# Patient Record
Sex: MALE | Race: WHITE | NOT HISPANIC OR LATINO | Employment: OTHER | URBAN - METROPOLITAN AREA
[De-identification: names, ages, dates, MRNs, and addresses within clinical notes are randomized per-mention and may not be internally consistent; named-entity substitution may affect disease eponyms.]

---

## 2018-03-09 ENCOUNTER — HISTORICAL (OUTPATIENT)
Dept: ADMINISTRATIVE | Facility: HOSPITAL | Age: 73
End: 2018-03-09

## 2018-03-09 LAB
ABS NEUT (OLG): 2.46 X10(3)/MCL (ref 2.1–9.2)
ALBUMIN SERPL-MCNC: 4 GM/DL (ref 3.4–5)
ALBUMIN/GLOB SERPL: 1.1 {RATIO}
ALP SERPL-CCNC: 78 UNIT/L (ref 50–136)
ALT SERPL-CCNC: 23 UNIT/L (ref 12–78)
AST SERPL-CCNC: 17 UNIT/L (ref 15–37)
BASOPHILS # BLD AUTO: 0 X10(3)/MCL (ref 0–0.2)
BASOPHILS NFR BLD AUTO: 0 %
BILIRUB SERPL-MCNC: 0.5 MG/DL (ref 0.2–1)
BILIRUBIN DIRECT+TOT PNL SERPL-MCNC: 0.1 MG/DL (ref 0–0.2)
BILIRUBIN DIRECT+TOT PNL SERPL-MCNC: 0.4 MG/DL (ref 0–0.8)
BUN SERPL-MCNC: 20 MG/DL (ref 7–18)
CALCIUM SERPL-MCNC: 9.1 MG/DL (ref 8.5–10.1)
CHLORIDE SERPL-SCNC: 106 MMOL/L (ref 98–107)
CHOLEST SERPL-MCNC: 191 MG/DL (ref 0–200)
CHOLEST/HDLC SERPL: 4.4 {RATIO} (ref 0–5)
CK SERPL-CCNC: 127 UNIT/L (ref 39–308)
CO2 SERPL-SCNC: 30 MMOL/L (ref 21–32)
CREAT SERPL-MCNC: 0.99 MG/DL (ref 0.7–1.3)
EOSINOPHIL # BLD AUTO: 0.3 X10(3)/MCL (ref 0–0.9)
EOSINOPHIL NFR BLD AUTO: 6 %
ERYTHROCYTE [DISTWIDTH] IN BLOOD BY AUTOMATED COUNT: 13.9 % (ref 11.5–17)
GGT SERPL-CCNC: 63 UNIT/L (ref 5–85)
GLOBULIN SER-MCNC: 3.6 GM/DL (ref 2.4–3.5)
GLUCOSE SERPL-MCNC: 92 MG/DL (ref 74–106)
HCT VFR BLD AUTO: 37.8 % (ref 42–52)
HDLC SERPL-MCNC: 43 MG/DL (ref 35–60)
HGB BLD-MCNC: 12.2 GM/DL (ref 14–18)
LDLC SERPL CALC-MCNC: 111 MG/DL (ref 0–129)
LYMPHOCYTES # BLD AUTO: 1.6 X10(3)/MCL (ref 0.6–4.6)
LYMPHOCYTES NFR BLD AUTO: 33 %
MCH RBC QN AUTO: 30.5 PG (ref 27–31)
MCHC RBC AUTO-ENTMCNC: 32.3 GM/DL (ref 33–36)
MCV RBC AUTO: 94.5 FL (ref 80–94)
MONOCYTES # BLD AUTO: 0.4 X10(3)/MCL (ref 0.1–1.3)
MONOCYTES NFR BLD AUTO: 9 %
NEUTROPHILS # BLD AUTO: 2.46 X10(3)/MCL (ref 2.1–9.2)
NEUTROPHILS NFR BLD AUTO: 52 %
PLATELET # BLD AUTO: 241 X10(3)/MCL (ref 130–400)
PMV BLD AUTO: 9.8 FL (ref 9.4–12.4)
POTASSIUM SERPL-SCNC: 5 MMOL/L (ref 3.5–5.1)
PROT SERPL-MCNC: 7.6 GM/DL (ref 6.4–8.2)
PSA SERPL-MCNC: 0.99 NG/ML (ref 0–4)
RBC # BLD AUTO: 4 X10(6)/MCL (ref 4.7–6.1)
SODIUM SERPL-SCNC: 142 MMOL/L (ref 136–145)
TRIGL SERPL-MCNC: 183 MG/DL (ref 30–150)
TSH SERPL-ACNC: 2.5 MIU/ML (ref 0.36–3.74)
VLDLC SERPL CALC-MCNC: 37 MG/DL
WBC # SPEC AUTO: 4.8 X10(3)/MCL (ref 4.5–11.5)

## 2018-11-07 ENCOUNTER — HISTORICAL (OUTPATIENT)
Dept: ADMINISTRATIVE | Facility: HOSPITAL | Age: 73
End: 2018-11-07

## 2018-11-07 LAB
ABS NEUT (OLG): 3.16 X10(3)/MCL (ref 2.1–9.2)
ALBUMIN SERPL-MCNC: 3.8 GM/DL (ref 3.4–5)
ALBUMIN/GLOB SERPL: 1.1 RATIO (ref 1.1–2)
ALP SERPL-CCNC: 68 UNIT/L (ref 50–136)
ALT SERPL-CCNC: 25 UNIT/L (ref 12–78)
AST SERPL-CCNC: 12 UNIT/L (ref 15–37)
BASOPHILS # BLD AUTO: 0 X10(3)/MCL (ref 0–0.2)
BASOPHILS NFR BLD AUTO: 0 %
BILIRUB SERPL-MCNC: 0.4 MG/DL (ref 0.2–1)
BILIRUBIN DIRECT+TOT PNL SERPL-MCNC: 0.1 MG/DL (ref 0–0.5)
BILIRUBIN DIRECT+TOT PNL SERPL-MCNC: 0.3 MG/DL (ref 0–0.8)
BUN SERPL-MCNC: 17 MG/DL (ref 7–18)
CALCIUM SERPL-MCNC: 9.3 MG/DL (ref 8.5–10.1)
CHLORIDE SERPL-SCNC: 107 MMOL/L (ref 98–107)
CHOLEST SERPL-MCNC: 187 MG/DL (ref 0–200)
CHOLEST/HDLC SERPL: 3.8 {RATIO} (ref 0–5)
CK SERPL-CCNC: 65 UNIT/L (ref 39–308)
CO2 SERPL-SCNC: 27 MMOL/L (ref 21–32)
CREAT SERPL-MCNC: 0.91 MG/DL (ref 0.7–1.3)
EOSINOPHIL # BLD AUTO: 0.5 X10(3)/MCL (ref 0–0.9)
EOSINOPHIL NFR BLD AUTO: 8 %
ERYTHROCYTE [DISTWIDTH] IN BLOOD BY AUTOMATED COUNT: 16 % (ref 11.5–17)
GLOBULIN SER-MCNC: 3.5 GM/DL (ref 2.4–3.5)
GLUCOSE SERPL-MCNC: 85 MG/DL (ref 74–106)
HCT VFR BLD AUTO: 39.9 % (ref 42–52)
HDLC SERPL-MCNC: 49 MG/DL (ref 35–60)
HGB BLD-MCNC: 12.3 GM/DL (ref 14–18)
LDLC SERPL CALC-MCNC: 94 MG/DL (ref 0–129)
LYMPHOCYTES # BLD AUTO: 1.6 X10(3)/MCL (ref 0.6–4.6)
LYMPHOCYTES NFR BLD AUTO: 28 %
MCH RBC QN AUTO: 28.5 PG (ref 27–31)
MCHC RBC AUTO-ENTMCNC: 30.8 GM/DL (ref 33–36)
MCV RBC AUTO: 92.4 FL (ref 80–94)
MONOCYTES # BLD AUTO: 0.4 X10(3)/MCL (ref 0.1–1.3)
MONOCYTES NFR BLD AUTO: 8 %
NEUTROPHILS # BLD AUTO: 3.16 X10(3)/MCL (ref 2.1–9.2)
NEUTROPHILS NFR BLD AUTO: 55 %
PLATELET # BLD AUTO: 238 X10(3)/MCL (ref 130–400)
PMV BLD AUTO: 9.4 FL (ref 9.4–12.4)
POTASSIUM SERPL-SCNC: 4.6 MMOL/L (ref 3.5–5.1)
PROT SERPL-MCNC: 7.3 GM/DL (ref 6.4–8.2)
RBC # BLD AUTO: 4.32 X10(6)/MCL (ref 4.7–6.1)
SODIUM SERPL-SCNC: 142 MMOL/L (ref 136–145)
TRIGL SERPL-MCNC: 221 MG/DL (ref 30–150)
TSH SERPL-ACNC: 1.61 MIU/L (ref 0.36–3.74)
VLDLC SERPL CALC-MCNC: 44 MG/DL
WBC # SPEC AUTO: 5.7 X10(3)/MCL (ref 4.5–11.5)

## 2020-05-26 ENCOUNTER — HISTORICAL (OUTPATIENT)
Dept: ADMINISTRATIVE | Facility: HOSPITAL | Age: 75
End: 2020-05-26

## 2020-05-26 LAB
ABS NEUT (OLG): 3.07 X10(3)/MCL (ref 2.1–9.2)
ALBUMIN SERPL-MCNC: 4.2 GM/DL (ref 3.4–5)
ALP SERPL-CCNC: 67 UNIT/L (ref 40–150)
ALT SERPL-CCNC: 20 UNIT/L (ref 0–55)
AST SERPL-CCNC: 22 UNIT/L (ref 5–34)
BASOPHILS # BLD AUTO: 0 X10(3)/MCL (ref 0–0.2)
BASOPHILS NFR BLD AUTO: 0 %
BILIRUB SERPL-MCNC: 0.5 MG/DL
BILIRUBIN DIRECT+TOT PNL SERPL-MCNC: 0.2 MG/DL (ref 0–0.5)
BILIRUBIN DIRECT+TOT PNL SERPL-MCNC: 0.3 MG/DL (ref 0–0.8)
BUN SERPL-MCNC: 23 MG/DL (ref 8.4–25.7)
CALCIUM SERPL-MCNC: 9 MG/DL (ref 8.8–10)
CHLORIDE SERPL-SCNC: 104 MMOL/L (ref 98–107)
CHOLEST SERPL-MCNC: 163 MG/DL
CHOLEST/HDLC SERPL: 4 {RATIO} (ref 0–5)
CK SERPL-CCNC: 154 U/L (ref 30–200)
CO2 SERPL-SCNC: 28 MMOL/L (ref 23–31)
CREAT SERPL-MCNC: 1.37 MG/DL (ref 0.73–1.18)
CREAT/UREA NIT SERPL: 17
EOSINOPHIL # BLD AUTO: 0.5 X10(3)/MCL (ref 0–0.9)
EOSINOPHIL NFR BLD AUTO: 9 %
ERYTHROCYTE [DISTWIDTH] IN BLOOD BY AUTOMATED COUNT: 14.2 % (ref 11.5–17)
GLUCOSE SERPL-MCNC: 94 MG/DL (ref 82–115)
HCT VFR BLD AUTO: 39.5 % (ref 42–52)
HDLC SERPL-MCNC: 43 MG/DL (ref 35–60)
HGB BLD-MCNC: 12.4 GM/DL (ref 14–18)
LDLC SERPL CALC-MCNC: 92 MG/DL (ref 50–140)
LIVER PROFILE INTERP: NORMAL
LYMPHOCYTES # BLD AUTO: 1.6 X10(3)/MCL (ref 0.6–4.6)
LYMPHOCYTES NFR BLD AUTO: 28 %
MCH RBC QN AUTO: 30.6 PG (ref 27–31)
MCHC RBC AUTO-ENTMCNC: 31.4 GM/DL (ref 33–36)
MCV RBC AUTO: 97.5 FL (ref 80–94)
MONOCYTES # BLD AUTO: 0.5 X10(3)/MCL (ref 0.1–1.3)
MONOCYTES NFR BLD AUTO: 9 %
NEUTROPHILS # BLD AUTO: 3.07 X10(3)/MCL (ref 2.1–9.2)
NEUTROPHILS NFR BLD AUTO: 54 %
PLATELET # BLD AUTO: 228 X10(3)/MCL (ref 130–400)
PMV BLD AUTO: 9.9 FL (ref 9.4–12.4)
POTASSIUM SERPL-SCNC: 4.9 MMOL/L (ref 3.5–5.1)
PROT SERPL-MCNC: 7.4 GM/DL (ref 5.8–7.6)
PSA SERPL-MCNC: 1.42 NG/ML
RBC # BLD AUTO: 4.05 X10(6)/MCL (ref 4.7–6.1)
SODIUM SERPL-SCNC: 143 MMOL/L (ref 136–145)
TRIGL SERPL-MCNC: 142 MG/DL (ref 34–140)
TSH SERPL-ACNC: 2.48 UIU/ML (ref 0.35–4.94)
VLDLC SERPL CALC-MCNC: 28 MG/DL
WBC # SPEC AUTO: 5.7 X10(3)/MCL (ref 4.5–11.5)

## 2020-09-14 ENCOUNTER — HISTORICAL (OUTPATIENT)
Dept: RADIOLOGY | Facility: HOSPITAL | Age: 75
End: 2020-09-14

## 2020-09-23 ENCOUNTER — HISTORICAL (OUTPATIENT)
Dept: INTENSIVE CARE | Facility: HOSPITAL | Age: 75
End: 2020-09-23

## 2020-09-30 ENCOUNTER — HISTORICAL (OUTPATIENT)
Dept: INTENSIVE CARE | Facility: HOSPITAL | Age: 75
End: 2020-09-30

## 2021-02-02 ENCOUNTER — HISTORICAL (OUTPATIENT)
Dept: ADMINISTRATIVE | Facility: HOSPITAL | Age: 76
End: 2021-02-02

## 2021-02-02 LAB
BUN SERPL-MCNC: 28.5 MG/DL (ref 8.4–25.7)
CALCIUM SERPL-MCNC: 9.6 MG/DL (ref 8.8–10)
CHLORIDE SERPL-SCNC: 102 MMOL/L (ref 98–107)
CO2 SERPL-SCNC: 31 MMOL/L (ref 23–31)
CREAT SERPL-MCNC: 1.28 MG/DL (ref 0.73–1.18)
CREAT/UREA NIT SERPL: 22
GLUCOSE SERPL-MCNC: 86 MG/DL (ref 82–115)
POTASSIUM SERPL-SCNC: 4.9 MMOL/L (ref 3.5–5.1)
SODIUM SERPL-SCNC: 140 MMOL/L (ref 136–145)

## 2021-04-22 ENCOUNTER — HISTORICAL (OUTPATIENT)
Dept: ADMINISTRATIVE | Facility: HOSPITAL | Age: 76
End: 2021-04-22

## 2021-04-22 LAB
ABS NEUT (OLG): 2.92 X10(3)/MCL (ref 2.1–9.2)
ALBUMIN SERPL-MCNC: 4.3 GM/DL (ref 3.4–4.8)
ALBUMIN/GLOB SERPL: 1.3 RATIO (ref 1.1–2)
ALP SERPL-CCNC: 88 UNIT/L (ref 40–150)
ALT SERPL-CCNC: 16 UNIT/L (ref 0–55)
AST SERPL-CCNC: 21 UNIT/L (ref 5–34)
BASOPHILS # BLD AUTO: 0 X10(3)/MCL (ref 0–0.2)
BASOPHILS NFR BLD AUTO: 0 %
BILIRUB SERPL-MCNC: 0.6 MG/DL
BILIRUBIN DIRECT+TOT PNL SERPL-MCNC: 0.2 MG/DL (ref 0–0.5)
BILIRUBIN DIRECT+TOT PNL SERPL-MCNC: 0.4 MG/DL (ref 0–0.8)
BUN SERPL-MCNC: 24.3 MG/DL (ref 8.4–25.7)
CALCIUM SERPL-MCNC: 9.6 MG/DL (ref 8.8–10)
CHLORIDE SERPL-SCNC: 104 MMOL/L (ref 98–107)
CHOLEST SERPL-MCNC: 152 MG/DL
CHOLEST/HDLC SERPL: 4 {RATIO} (ref 0–5)
CK SERPL-CCNC: 107 U/L (ref 30–200)
CO2 SERPL-SCNC: 28 MMOL/L (ref 23–31)
CREAT SERPL-MCNC: 1.18 MG/DL (ref 0.73–1.18)
EOSINOPHIL # BLD AUTO: 0.4 X10(3)/MCL (ref 0–0.9)
EOSINOPHIL NFR BLD AUTO: 7 %
ERYTHROCYTE [DISTWIDTH] IN BLOOD BY AUTOMATED COUNT: 14.7 % (ref 11.5–17)
GLOBULIN SER-MCNC: 3.2 GM/DL (ref 2.4–3.5)
GLUCOSE SERPL-MCNC: 83 MG/DL (ref 82–115)
HCT VFR BLD AUTO: 37.1 % (ref 42–52)
HDLC SERPL-MCNC: 38 MG/DL (ref 35–60)
HGB BLD-MCNC: 11.7 GM/DL (ref 14–18)
LDLC SERPL CALC-MCNC: 75 MG/DL (ref 50–140)
LYMPHOCYTES # BLD AUTO: 1.6 X10(3)/MCL (ref 0.6–4.6)
LYMPHOCYTES NFR BLD AUTO: 30 %
MCH RBC QN AUTO: 31 PG (ref 27–31)
MCHC RBC AUTO-ENTMCNC: 31.5 GM/DL (ref 33–36)
MCV RBC AUTO: 98.4 FL (ref 80–94)
MONOCYTES # BLD AUTO: 0.5 X10(3)/MCL (ref 0.1–1.3)
MONOCYTES NFR BLD AUTO: 9 %
NEUTROPHILS # BLD AUTO: 2.92 X10(3)/MCL (ref 2.1–9.2)
NEUTROPHILS NFR BLD AUTO: 53 %
PLATELET # BLD AUTO: 211 X10(3)/MCL (ref 130–400)
PMV BLD AUTO: 11.4 FL (ref 9.4–12.4)
POTASSIUM SERPL-SCNC: 5 MMOL/L (ref 3.5–5.1)
PROT SERPL-MCNC: 7.5 GM/DL (ref 5.8–7.6)
PSA SERPL-MCNC: 0.76 NG/ML
RBC # BLD AUTO: 3.77 X10(6)/MCL (ref 4.7–6.1)
SODIUM SERPL-SCNC: 141 MMOL/L (ref 136–145)
TRIGL SERPL-MCNC: 194 MG/DL (ref 34–140)
TSH SERPL-ACNC: 2.17 UIU/ML (ref 0.35–4.94)
URATE SERPL-MCNC: 7.6 MG/DL (ref 3.5–7.2)
VLDLC SERPL CALC-MCNC: 39 MG/DL
WBC # SPEC AUTO: 5.5 X10(3)/MCL (ref 4.5–11.5)

## 2021-04-27 ENCOUNTER — HISTORICAL (OUTPATIENT)
Dept: ADMINISTRATIVE | Facility: HOSPITAL | Age: 76
End: 2021-04-27

## 2021-07-08 ENCOUNTER — HISTORICAL (OUTPATIENT)
Dept: RADIOLOGY | Facility: HOSPITAL | Age: 76
End: 2021-07-08

## 2022-04-11 ENCOUNTER — HISTORICAL (OUTPATIENT)
Dept: ADMINISTRATIVE | Facility: HOSPITAL | Age: 77
End: 2022-04-11
Payer: MEDICARE

## 2022-04-28 VITALS
WEIGHT: 220.44 LBS | SYSTOLIC BLOOD PRESSURE: 120 MMHG | HEIGHT: 68 IN | DIASTOLIC BLOOD PRESSURE: 68 MMHG | BODY MASS INDEX: 33.41 KG/M2

## 2022-05-27 ENCOUNTER — LAB VISIT (OUTPATIENT)
Dept: LAB | Facility: HOSPITAL | Age: 77
End: 2022-05-27
Attending: INTERNAL MEDICINE
Payer: MEDICARE

## 2022-05-27 DIAGNOSIS — R42 DIZZINESS AND GIDDINESS: ICD-10-CM

## 2022-05-27 DIAGNOSIS — I10 HYPERTENSION, UNSPECIFIED TYPE: ICD-10-CM

## 2022-05-27 DIAGNOSIS — Z00.01 ENCOUNTER FOR GENERAL ADULT MEDICAL EXAMINATION WITH ABNORMAL FINDINGS: Primary | ICD-10-CM

## 2022-05-27 DIAGNOSIS — N20.0 URIC ACID NEPHROLITHIASIS: ICD-10-CM

## 2022-05-27 DIAGNOSIS — E78.00 PURE HYPERCHOLESTEROLEMIA: ICD-10-CM

## 2022-05-27 DIAGNOSIS — Z12.5 SPECIAL SCREENING FOR MALIGNANT NEOPLASM OF PROSTATE: ICD-10-CM

## 2022-05-27 DIAGNOSIS — D64.9 ANEMIA, UNSPECIFIED TYPE: ICD-10-CM

## 2022-05-27 LAB
ALBUMIN SERPL-MCNC: 4.3 GM/DL (ref 3.4–4.8)
ALP SERPL-CCNC: 97 UNIT/L (ref 40–150)
ALT SERPL-CCNC: 16 UNIT/L (ref 0–55)
ANION GAP SERPL CALC-SCNC: 10 MEQ/L
AST SERPL-CCNC: 19 UNIT/L (ref 5–34)
BASOPHILS # BLD AUTO: 0.02 X10(3)/MCL (ref 0–0.2)
BASOPHILS NFR BLD AUTO: 0.4 %
BILIRUBIN DIRECT+TOT PNL SERPL-MCNC: 0.3 MG/DL (ref 0–0.5)
BILIRUBIN DIRECT+TOT PNL SERPL-MCNC: 0.5 MG/DL (ref 0–0.8)
BILIRUBIN DIRECT+TOT PNL SERPL-MCNC: 0.8 MG/DL
BUN SERPL-MCNC: 32.8 MG/DL (ref 8.4–25.7)
CALCIUM SERPL-MCNC: 9.7 MG/DL (ref 8.8–10)
CHLORIDE SERPL-SCNC: 101 MMOL/L (ref 98–107)
CHOLEST SERPL-MCNC: 180 MG/DL
CHOLEST/HDLC SERPL: 4 {RATIO} (ref 0–5)
CK SERPL-CCNC: 103 U/L (ref 30–200)
CO2 SERPL-SCNC: 30 MMOL/L (ref 23–31)
CREAT SERPL-MCNC: 1.28 MG/DL (ref 0.73–1.18)
CREAT/UREA NIT SERPL: 26
EOSINOPHIL # BLD AUTO: 0.44 X10(3)/MCL (ref 0–0.9)
EOSINOPHIL NFR BLD AUTO: 7.7 %
ERYTHROCYTE [DISTWIDTH] IN BLOOD BY AUTOMATED COUNT: 15.5 % (ref 11.5–17)
GLUCOSE SERPL-MCNC: 85 MG/DL (ref 82–115)
HCT VFR BLD AUTO: 39.1 % (ref 42–52)
HDLC SERPL-MCNC: 48 MG/DL (ref 35–60)
HGB BLD-MCNC: 12.5 GM/DL (ref 14–18)
IMM GRANULOCYTES # BLD AUTO: 0.03 X10(3)/MCL (ref 0–0.02)
IMM GRANULOCYTES NFR BLD AUTO: 0.5 % (ref 0–0.43)
LDLC SERPL CALC-MCNC: 107 MG/DL (ref 50–140)
LYMPHOCYTES # BLD AUTO: 1.6 X10(3)/MCL (ref 0.6–4.6)
LYMPHOCYTES NFR BLD AUTO: 28.1 %
MCH RBC QN AUTO: 29.3 PG (ref 27–31)
MCHC RBC AUTO-ENTMCNC: 32 MG/DL (ref 33–36)
MCV RBC AUTO: 91.6 FL (ref 80–94)
MONOCYTES # BLD AUTO: 0.49 X10(3)/MCL (ref 0.1–1.3)
MONOCYTES NFR BLD AUTO: 8.6 %
NEUTROPHILS # BLD AUTO: 3.1 X10(3)/MCL (ref 2.1–9.2)
NEUTROPHILS NFR BLD AUTO: 54.7 %
NRBC BLD AUTO-RTO: 0 %
PATH REV: NORMAL
PLATELET # BLD AUTO: 259 X10(3)/MCL (ref 130–400)
PMV BLD AUTO: 10.6 FL (ref 9.4–12.4)
POTASSIUM SERPL-SCNC: 4.7 MMOL/L (ref 3.5–5.1)
PROT SERPL-MCNC: 7.8 GM/DL (ref 5.8–7.6)
PSA SERPL-MCNC: 1.1 NG/ML
RBC # BLD AUTO: 4.27 X10(6)/MCL (ref 4.7–6.1)
SODIUM SERPL-SCNC: 141 MMOL/L (ref 136–145)
TRIGL SERPL-MCNC: 126 MG/DL (ref 34–140)
TSH SERPL-ACNC: 1.99 UIU/ML (ref 0.35–4.94)
VLDLC SERPL CALC-MCNC: 25 MG/DL
WBC # SPEC AUTO: 5.7 X10(3)/MCL (ref 4.5–11.5)

## 2022-05-27 PROCEDURE — 80061 LIPID PANEL: CPT

## 2022-05-27 PROCEDURE — 84443 ASSAY THYROID STIM HORMONE: CPT

## 2022-05-27 PROCEDURE — 80053 COMPREHEN METABOLIC PANEL: CPT

## 2022-05-27 PROCEDURE — 82550 ASSAY OF CK (CPK): CPT

## 2022-05-27 PROCEDURE — 85025 COMPLETE CBC W/AUTO DIFF WBC: CPT

## 2022-05-27 PROCEDURE — 36415 COLL VENOUS BLD VENIPUNCTURE: CPT

## 2022-05-27 PROCEDURE — 82248 BILIRUBIN DIRECT: CPT

## 2022-05-27 PROCEDURE — 84153 ASSAY OF PSA TOTAL: CPT

## 2022-08-22 ENCOUNTER — HOSPITAL ENCOUNTER (INPATIENT)
Facility: HOSPITAL | Age: 77
LOS: 3 days | Discharge: NURSING, REHAB OR LONG-TERM CARE | DRG: 552 | End: 2022-08-25
Attending: INTERNAL MEDICINE | Admitting: INTERNAL MEDICINE
Payer: MEDICARE

## 2022-08-22 DIAGNOSIS — Z78.9 DEFICIT IN ACTIVITIES OF DAILY LIVING (ADL): ICD-10-CM

## 2022-08-22 DIAGNOSIS — I10 PRIMARY HYPERTENSION: ICD-10-CM

## 2022-08-22 DIAGNOSIS — G47.10 INCREASED SLEEPING: ICD-10-CM

## 2022-08-22 DIAGNOSIS — R53.1 WEAKNESS: ICD-10-CM

## 2022-08-22 DIAGNOSIS — Z74.09 REDUCED MOBILITY: Primary | ICD-10-CM

## 2022-08-22 DIAGNOSIS — G89.29 CHRONIC BILATERAL LOW BACK PAIN, UNSPECIFIED WHETHER SCIATICA PRESENT: ICD-10-CM

## 2022-08-22 DIAGNOSIS — M54.50 CHRONIC BILATERAL LOW BACK PAIN, UNSPECIFIED WHETHER SCIATICA PRESENT: ICD-10-CM

## 2022-08-22 DIAGNOSIS — Z91.81 AT RISK FOR INJURY RELATED TO FALL: ICD-10-CM

## 2022-08-22 LAB
ALBUMIN SERPL-MCNC: 4.3 GM/DL (ref 3.4–4.8)
ALBUMIN/GLOB SERPL: 1.1 RATIO (ref 1.1–2)
ALP SERPL-CCNC: 95 UNIT/L (ref 40–150)
ALT SERPL-CCNC: 18 UNIT/L (ref 0–55)
APPEARANCE UR: CLEAR
AST SERPL-CCNC: 25 UNIT/L (ref 5–34)
BASOPHILS # BLD AUTO: 0.04 X10(3)/MCL (ref 0–0.2)
BASOPHILS NFR BLD AUTO: 0.6 %
BILIRUB UR QL STRIP.AUTO: NEGATIVE MG/DL
BILIRUBIN DIRECT+TOT PNL SERPL-MCNC: 0.5 MG/DL
BUN SERPL-MCNC: 29.8 MG/DL (ref 8.4–25.7)
CALCIUM SERPL-MCNC: 9.6 MG/DL (ref 8.8–10)
CHLORIDE SERPL-SCNC: 103 MMOL/L (ref 98–107)
CK MB SERPL-MCNC: 4.2 NG/ML
CK SERPL-CCNC: 130 U/L (ref 30–200)
CO2 SERPL-SCNC: 28 MMOL/L (ref 23–31)
COLOR UR AUTO: ABNORMAL
CREAT SERPL-MCNC: 1.45 MG/DL (ref 0.73–1.18)
EOSINOPHIL # BLD AUTO: 0.39 X10(3)/MCL (ref 0–0.9)
EOSINOPHIL NFR BLD AUTO: 6.2 %
ERYTHROCYTE [DISTWIDTH] IN BLOOD BY AUTOMATED COUNT: 15.3 % (ref 11.5–17)
FLUAV AG UPPER RESP QL IA.RAPID: NOT DETECTED
FLUBV AG UPPER RESP QL IA.RAPID: NOT DETECTED
GFR SERPLBLD CREATININE-BSD FMLA CKD-EPI: 50 MLS/MIN/1.73/M2
GLOBULIN SER-MCNC: 3.8 GM/DL (ref 2.4–3.5)
GLUCOSE SERPL-MCNC: 79 MG/DL (ref 82–115)
GLUCOSE UR QL STRIP.AUTO: NEGATIVE MG/DL
HCT VFR BLD AUTO: 39.1 % (ref 42–52)
HGB BLD-MCNC: 12.5 GM/DL (ref 14–18)
IMM GRANULOCYTES # BLD AUTO: 0.02 X10(3)/MCL (ref 0–0.04)
IMM GRANULOCYTES NFR BLD AUTO: 0.3 %
KETONES UR QL STRIP.AUTO: NEGATIVE MG/DL
LEUKOCYTE ESTERASE UR QL STRIP.AUTO: NEGATIVE UNIT/L
LYMPHOCYTES # BLD AUTO: 1.69 X10(3)/MCL (ref 0.6–4.6)
LYMPHOCYTES NFR BLD AUTO: 26.9 %
MAGNESIUM SERPL-MCNC: 2.5 MG/DL (ref 1.6–2.6)
MCH RBC QN AUTO: 30.8 PG (ref 27–31)
MCHC RBC AUTO-ENTMCNC: 32 MG/DL (ref 33–36)
MCV RBC AUTO: 96.3 FL (ref 80–94)
MONOCYTES # BLD AUTO: 0.57 X10(3)/MCL (ref 0.1–1.3)
MONOCYTES NFR BLD AUTO: 9.1 %
NEUTROPHILS # BLD AUTO: 3.6 X10(3)/MCL (ref 2.1–9.2)
NEUTROPHILS NFR BLD AUTO: 56.9 %
NITRITE UR QL STRIP.AUTO: NEGATIVE
NRBC BLD AUTO-RTO: 0 %
PH UR STRIP.AUTO: 7.5 [PH]
PLATELET # BLD AUTO: 161 X10(3)/MCL (ref 130–400)
PLATELET # BLD EST: ADEQUATE 10*3/UL
PMV BLD AUTO: 11.3 FL (ref 7.4–10.4)
POTASSIUM SERPL-SCNC: 5.1 MMOL/L (ref 3.5–5.1)
PROT SERPL-MCNC: 8.1 GM/DL (ref 5.8–7.6)
PROT UR QL STRIP.AUTO: NEGATIVE MG/DL
RBC # BLD AUTO: 4.06 X10(6)/MCL (ref 4.7–6.1)
RBC MORPH BLD: NORMAL
RBC UR QL AUTO: NEGATIVE UNIT/L
SARS-COV-2 RNA RESP QL NAA+PROBE: NOT DETECTED
SODIUM SERPL-SCNC: 140 MMOL/L (ref 136–145)
SP GR UR STRIP.AUTO: 1.01
TROPONIN I SERPL-MCNC: <0.01 NG/ML (ref 0–0.04)
TSH SERPL-ACNC: 1.78 UIU/ML (ref 0.35–4.94)
UROBILINOGEN UR STRIP-ACNC: 0.2 MG/DL
WBC # SPEC AUTO: 6.3 X10(3)/MCL (ref 4.5–11.5)

## 2022-08-22 PROCEDURE — 99291 CRITICAL CARE FIRST HOUR: CPT | Mod: 25

## 2022-08-22 PROCEDURE — 82553 CREATINE MB FRACTION: CPT | Performed by: NURSE PRACTITIONER

## 2022-08-22 PROCEDURE — 11000001 HC ACUTE MED/SURG PRIVATE ROOM

## 2022-08-22 PROCEDURE — 93010 EKG 12-LEAD: ICD-10-PCS | Mod: ,,, | Performed by: INTERNAL MEDICINE

## 2022-08-22 PROCEDURE — 84484 ASSAY OF TROPONIN QUANT: CPT | Performed by: NURSE PRACTITIONER

## 2022-08-22 PROCEDURE — 84443 ASSAY THYROID STIM HORMONE: CPT | Performed by: INTERNAL MEDICINE

## 2022-08-22 PROCEDURE — 87636 SARSCOV2 & INF A&B AMP PRB: CPT | Performed by: NURSE PRACTITIONER

## 2022-08-22 PROCEDURE — 93005 ELECTROCARDIOGRAM TRACING: CPT

## 2022-08-22 PROCEDURE — 80053 COMPREHEN METABOLIC PANEL: CPT | Performed by: NURSE PRACTITIONER

## 2022-08-22 PROCEDURE — 63600175 PHARM REV CODE 636 W HCPCS: Performed by: NURSE PRACTITIONER

## 2022-08-22 PROCEDURE — 85025 COMPLETE CBC W/AUTO DIFF WBC: CPT | Performed by: NURSE PRACTITIONER

## 2022-08-22 PROCEDURE — 82550 ASSAY OF CK (CPK): CPT | Performed by: NURSE PRACTITIONER

## 2022-08-22 PROCEDURE — 96360 HYDRATION IV INFUSION INIT: CPT

## 2022-08-22 PROCEDURE — 36415 COLL VENOUS BLD VENIPUNCTURE: CPT | Performed by: NURSE PRACTITIONER

## 2022-08-22 PROCEDURE — 83735 ASSAY OF MAGNESIUM: CPT | Performed by: NURSE PRACTITIONER

## 2022-08-22 PROCEDURE — 93010 ELECTROCARDIOGRAM REPORT: CPT | Mod: ,,, | Performed by: INTERNAL MEDICINE

## 2022-08-22 PROCEDURE — 81003 URINALYSIS AUTO W/O SCOPE: CPT | Performed by: NURSE PRACTITIONER

## 2022-08-22 RX ORDER — HYDROCODONE BITARTRATE AND ACETAMINOPHEN 10; 325 MG/1; MG/1
1 TABLET ORAL EVERY 6 HOURS PRN
Status: DISCONTINUED | OUTPATIENT
Start: 2022-08-23 | End: 2022-08-23

## 2022-08-22 RX ORDER — MAGNESIUM 30 MG
400 TABLET ORAL ONCE
COMMUNITY

## 2022-08-22 RX ORDER — SERTRALINE HYDROCHLORIDE 50 MG/1
50 TABLET, FILM COATED ORAL DAILY
COMMUNITY
Start: 2022-06-21 | End: 2023-02-23

## 2022-08-22 RX ORDER — LANOLIN ALCOHOL/MO/W.PET/CERES
1 CREAM (GRAM) TOPICAL
COMMUNITY

## 2022-08-22 RX ORDER — TRIAMTERENE AND HYDROCHLOROTHIAZIDE 37.5; 25 MG/1; MG/1
1 CAPSULE ORAL EVERY MORNING
COMMUNITY
End: 2022-08-23

## 2022-08-22 RX ORDER — MEMANTINE HYDROCHLORIDE 10 MG/1
10 TABLET ORAL 2 TIMES DAILY
COMMUNITY
Start: 2022-07-07 | End: 2023-02-17

## 2022-08-22 RX ORDER — ALENDRONATE SODIUM 35 MG/1
35 TABLET ORAL
COMMUNITY

## 2022-08-22 RX ORDER — PREGABALIN 75 MG/1
75 CAPSULE ORAL
COMMUNITY
Start: 2022-01-20

## 2022-08-22 RX ORDER — ATORVASTATIN CALCIUM 20 MG/1
20 TABLET, FILM COATED ORAL DAILY
COMMUNITY
Start: 2022-07-19

## 2022-08-22 RX ORDER — HYDROCODONE BITARTRATE AND ACETAMINOPHEN 10; 325 MG/1; MG/1
1 TABLET ORAL 3 TIMES DAILY
COMMUNITY
Start: 2022-08-18

## 2022-08-22 RX ADMIN — SODIUM CHLORIDE, POTASSIUM CHLORIDE, SODIUM LACTATE AND CALCIUM CHLORIDE 1000 ML: 600; 310; 30; 20 INJECTION, SOLUTION INTRAVENOUS at 03:08

## 2022-08-22 NOTE — ED PROVIDER NOTES
Encounter Date: 8/22/2022       History     Chief Complaint   Patient presents with    Extremity Weakness     Non-traumatic bilat leg weakness     78 y/o male who presents from home via [facility] for concern for worsening weakness since last night. Patient's wife and son at bedside and explain that the patient normally ambulates either with a walker and/or without and as of last night he wasn't even able to use his walker to get around the house. Family also states he has been sleeping much more than he normally does. No recent falls. He did eat a biscuit this AM and did eat well yesterday. No cough/fever/n/v/d. Had normal BM this AM before coming in. He has chronic back pain for years now and had surgery (decompression) 2018 with Dr. Mcknight. He had nerve burning to his lumbar region by dr. soto about 3 weeks ago. Other than this no new changes of medications or history.     The history is provided by the patient, a relative and the spouse. No  was used.   Extremity Weakness  This is a new problem. The current episode started yesterday. The problem occurs constantly. Exacerbated by: moving.     Review of patient's allergies indicates:   Allergen Reactions    Codeine      Other reaction(s): Confusion     No past medical history on file.  No past surgical history on file.  No family history on file.     Review of Systems   Musculoskeletal: Positive for extremity weakness.        Bilateral LE weakness more so than UE   Neurological: Positive for weakness.   All other systems reviewed and are negative.      Physical Exam     Initial Vitals [08/22/22 1344]   BP Pulse Resp Temp SpO2   (!) 154/55 (!) 56 18 97.9 °F (36.6 °C) 95 %      MAP       --         Physical Exam    Nursing note and vitals reviewed.  Constitutional: He appears well-developed.   Eyes: Conjunctivae are normal. Pupils are equal, round, and reactive to light.   Neck:   Normal range of motion.  Cardiovascular: Regular rhythm and  normal heart sounds. Bradycardia present.    Pulmonary/Chest: Breath sounds normal. No respiratory distress.   Abdominal: Abdomen is soft. Bowel sounds are normal. There is no abdominal tenderness.   Musculoskeletal:      Cervical back: Normal range of motion.      Comments: He is able to move bilateral UE and LE. He pushes/pulls against me well. He is able to lift his left and right legs but does appear to use a lot of strength and effort to do so and they fall fairly quickly. He can bend his knees. He is unable to move in the stretcher without assistance.      Neurological: He is alert and oriented to person, place, and time.   Falls asleep quite easily and more often than family states. He is aware of who he is, where he is, who his family is and who the president is. He was unable to answer the year correctly although his wife states this isn't completely abnormal for him.   Skin: Skin is warm and dry.   Psychiatric: He has a normal mood and affect.   Patient is sleeping a lot while in ER         ED Course   Procedures  Labs Reviewed   CBC WITH DIFFERENTIAL - Abnormal; Notable for the following components:       Result Value    RBC 4.06 (*)     Hgb 12.5 (*)     Hct 39.1 (*)     MCV 96.3 (*)     MCHC 32.0 (*)     MPV 11.3 (*)     All other components within normal limits   COMPREHENSIVE METABOLIC PANEL - Abnormal; Notable for the following components:    Glucose Level 79 (*)     Blood Urea Nitrogen 29.8 (*)     Creatinine 1.45 (*)     Protein Total 8.1 (*)     Globulin 3.8 (*)     All other components within normal limits   URINALYSIS - Abnormal; Notable for the following components:    Color, UA Straw (*)     All other components within normal limits   MAGNESIUM - Normal   TROPONIN I - Normal   COVID/FLU A&B PCR - Normal   BLOOD SMEAR MICROSCOPIC EXAM (OLG) - Normal   CK-MB - Normal   CK - Normal   TSH - Normal     EKG Readings: (Independently Interpreted)   Rhythm: Sinus Bradycardia. Heart Rate: 51. Ectopy: No  Ectopy. Clinical Impression: Sinus Bradycardia Other Impression: sinus bradycardia with short WV     ECG Results          EKG 12-lead (In process)  Result time 08/22/22 15:24:57    In process by Interface, Lab In [facility] (08/22/22 15:24:57)                 Narrative:    Test Reason : R53.1,    Vent. Rate : 051 BPM     Atrial Rate : 051 BPM     P-R Int : 110 ms          QRS Dur : 094 ms      QT Int : 458 ms       P-R-T Axes : 052 049 060 degrees     QTc Int : 422 ms    Sinus bradycardia with short WV  Otherwise normal ECG  No previous ECGs available    Referred By: [email or web address]   SELF           Confirmed By:                             Imaging Results          CT Lumbar Spine Without Contrast (Final result)  Result time 08/22/22 18:39:12    Final result by David Mccormick MD (08/22/22 18:39:12)                 Impression:      1. Chronic compression deformities L1 and L2.  2. Nondisplaced fracture right inferior facet L4 which could be chronic.      Electronically signed by: David Mccormick  Date:    08/22/2022  Time:    18:39             Narrative:    EXAMINATION:  CT LUMBAR SPINE WITHOUT CONTRAST    CLINICAL HISTORY:  Low back pain, increased fracture risk;    TECHNIQUE:  Helical acquisition through the lumbar spine without IV contrast.  Three plane reconstructions were provided for review.  mGycm. Automatic exposure control, adjustment of mA/kV or iterative reconstruction technique was used to reduce radiation.    COMPARISON:  No priors    FINDINGS:  No significant alignment abnormality.  Moderate to severe compression deformities L1 and L2.  The L1 compression deformity is status post kyphoplasty with some epidural extension of the kyphoplasty cement.  There is nondisplaced fracture right inferior facet L4 image 43 series 6, this may be chronic.  Moderate degenerative changes of the lumbar spine elsewhere.  No high-grade spinal canal narrowing is evident.  There is neural foraminal narrowing probably were stat  L1-L2 on the right.  Partially imaged distended urinary bladder.  Moderate atherosclerotic disease.                               CT Head Without Contrast (Final result)  Result time 08/22/22 15:01:04    Final result by Srikanth Perez MD (08/22/22 15:01:04)                 Impression:      No acute abnormality.  Age-appropriate cerebral and cerebellar volume loss with ex vacuo dilatation of the ventricles.  Changes of microangiopathy.      Electronically signed by: Srikanth Perez MD  Date:    08/22/2022  Time:    15:01             Narrative:    EXAMINATION:  CT HEAD WITHOUT CONTRAST    CLINICAL HISTORY:  Transient ischemic attack (TIA);    TECHNIQUE:  Low dose axial CT images obtained throughout the head without intravenous contrast. Sagittal and coronal reconstructions were performed.    COMPARISON:  09/14/2020    FINDINGS:  Intracranial compartment:    Ventricles and sulci are normal in size for age without evidence of hydrocephalus. No extra-axial blood or fluid collections.    The brain parenchyma appears normal. No parenchymal mass, hemorrhage, edema or major vascular distribution infarct.    Skull/extracranial contents (limited evaluation): No fracture. Mastoid air cells and paranasal sinuses are essentially clear.                               X-Ray Chest 1 View (Final result)  Result time 08/22/22 15:04:02    Final result by Yaya Bonds MD (08/22/22 15:04:02)                 Impression:      No acute chest disease is identified.      Electronically signed by: Yaya Bonds  Date:    08/22/2022  Time:    15:04             Narrative:    EXAMINATION:  XR CHEST 1 VIEW    CLINICAL HISTORY:  weakness;, .    FINDINGS:  No alveolar consolidation, effusion, or pneumothorax is seen.   There is some coiling of the thoracic aorta cardiac silhouette, central pulmonary vessels and mediastinum are normal in size and are grossly unremarkable.   visualized osseous structures are grossly unremarkable.                                  Medications   lactated ringers bolus 1,000 mL (1,000 mLs Intravenous New Bag 8/22/22 1338)     Medical Decision Making:   Clinical Tests:   Lab Tests: Ordered and Reviewed  Radiological Study: Ordered and Reviewed  Medical Tests: Ordered and Reviewed  ED Management:  Lab work unremarkable for acute findings with creat 1.45 (baseline appears to be 1.2-1.4 over the last year), trop/ck/ckmb normal. Ct head ct lumbar no acute new findings. He ate here in ER, he received a liter of fluids. We attempted to get him to use walker to ambulate to bathroom and he was unable to do so. He required moderate-max assist to get to bedside commode. He lives with his wife and his sons do help and stop in. Patient is not safe to go home in his current condition. May require further work up and/or rehab placement. This seems to e more of an acute even with his weakness starting late last night and continuing on into this AM. Hx significant lumbar issues, CT shows chronic fractures. Discussed again with DR. Coy and he will agree to admit patient. Patient will go to [facility] to be seen by him.   Other:   I have discussed this case with another health care provider.       <> Summary of the Discussion: Spoke with dr. Coy twice regarding the patient, his work up , his exam, need for admit. Spoke with Dr. eTlles as well about patient and she had face to face with patient and agrees with him requiring admission.     Additional MDM:   Differential Diagnosis:   Other: The following diagnoses were also considered and will be evaluated: dehydration, lumbar radiculopathy.           ED Course as of 08/22/22 2123   Mon Aug 22, 2022   2125 Spoke with Dr. Correa regarding admission for his patient regarding his new onset of weakness and inability to ambulate with walker despite being able to do this previously. Symptoms started Sunday night and worsened today. Discussed labs, ekg, ct head and xray all being  unremarkable for acute findings. Able to move legs in bed but even after eating and receiving fluids required moderate assistance to get to bedside commode and was unable to use the walker. Suggested admission for his weakness and possible placement in a rehab facility and further evaluation for his weakness. Dr. Correa wants to have a cpk done with his muscle weakness and would like to CT his lumbar with his known history of lumbar degenerative disease. He does not feel he meets admission at this time. I disagree with him not meeting admission currently. I discussed I will add on these labs and get the imaging. Will call him back.     I also had this discussion with DR. Telles of what Dr. Smith and I discussed  [EV]   1909 Patient was seen and evaluated by myself, Dr. Telles. I had face-to-face time with the patient and agree with documentation and medical decision making as documented. Any additions or corrections will be noted here.    Per patient's family, noticed a change in the patient today. He typically is very active. However he has slept the majority of today and has been very weak. He has chronic back pain from chronic fractures which is about at baseline. No new falls. No recent vaccinations or medication changes. No other triggers for Guilliane Rillton or other ascending weakness noted. On exam he has 5/5 strength in his upper extremities and 3/5 in BLE with the left being slightly weaker than the right. He is bradycardic with normal troponin. No source of infection at this time. CK normal. CT shows chronic fractures. Attempted to ambulate patient with walker as that is what he has at home but even with assistance from two male nurses, the patient could not get himself up and out of bed and could not reliably hold self up with walker. His wife lives with him but is unable to safely support him at home. Also the abruptness of onset without clear cause is concerning. Patient is unsafe to discharge  to home and may benefit from further work up to discern cause of severe weakness.     Sarah Beth Telles     [KAYLENE]      ED Course User Index  [EV] ILIANA Hernandez  [] Sarah Beth Telles MD             Clinical Impression:   Final diagnoses:  [R53.1] Weakness  [Z74.09] Reduced mobility (Primary)  [Z78.9] Deficit in activities of daily living (ADL)  [M54.50, G89.29] Chronic bilateral low back pain, unspecified whether sciatica present  [G47.10] Increased sleeping  [Z91.81] At risk for injury related to fall          ED Disposition Condition    Transfer to Another Facility Stable              ILIANA Hernandez  08/22/22 1127

## 2022-08-22 NOTE — Clinical Note
Diagnosis: Weakness [790239]  Admitting Provider:: KENDALL STEWART [[id number]]  Future Attending Provider: KENDALL STEWART [[id number]]  Reason for IP Medical Treatment  (Clinical interventions that can only be accomplished in the IP setting? ) :: needs a dmission  Estimated Length of Stay:: 2 midnights  I certify that Inpatient services for greater than or equal to 2 midnights are medically necessary:: Yes  Plans for Post-Acute care--if anticipated (pick the single best option):: F. IP Rehabilitation  Unit Placement

## 2022-08-23 PROBLEM — I10 HTN (HYPERTENSION): Status: ACTIVE | Noted: 2022-08-23

## 2022-08-23 PROBLEM — R53.1 WEAKNESS: Status: ACTIVE | Noted: 2022-08-23

## 2022-08-23 LAB
ALBUMIN SERPL-MCNC: 3.8 GM/DL (ref 3.4–4.8)
ALBUMIN/GLOB SERPL: 1.1 RATIO (ref 1.1–2)
ALP SERPL-CCNC: 85 UNIT/L (ref 40–150)
ALT SERPL-CCNC: 17 UNIT/L (ref 0–55)
AST SERPL-CCNC: 20 UNIT/L (ref 5–34)
BASOPHILS # BLD AUTO: 0.02 X10(3)/MCL (ref 0–0.2)
BASOPHILS NFR BLD AUTO: 0.3 %
BILIRUBIN DIRECT+TOT PNL SERPL-MCNC: 0.6 MG/DL
BUN SERPL-MCNC: 25.7 MG/DL (ref 8.4–25.7)
CALCIUM SERPL-MCNC: 9.6 MG/DL (ref 8.8–10)
CHLORIDE SERPL-SCNC: 106 MMOL/L (ref 98–107)
CO2 SERPL-SCNC: 27 MMOL/L (ref 23–31)
CREAT SERPL-MCNC: 1.43 MG/DL (ref 0.73–1.18)
EOSINOPHIL # BLD AUTO: 0.45 X10(3)/MCL (ref 0–0.9)
EOSINOPHIL NFR BLD AUTO: 6.2 %
ERYTHROCYTE [DISTWIDTH] IN BLOOD BY AUTOMATED COUNT: 15.1 % (ref 11.5–17)
GFR SERPLBLD CREATININE-BSD FMLA CKD-EPI: 51 MLS/MIN/1.73/M2
GLOBULIN SER-MCNC: 3.4 GM/DL (ref 2.4–3.5)
GLUCOSE SERPL-MCNC: 87 MG/DL (ref 82–115)
HCT VFR BLD AUTO: 37.3 % (ref 42–52)
HGB BLD-MCNC: 11.8 GM/DL (ref 14–18)
IMM GRANULOCYTES # BLD AUTO: 0.03 X10(3)/MCL (ref 0–0.04)
IMM GRANULOCYTES NFR BLD AUTO: 0.4 %
LYMPHOCYTES # BLD AUTO: 2.05 X10(3)/MCL (ref 0.6–4.6)
LYMPHOCYTES NFR BLD AUTO: 28.4 %
MCH RBC QN AUTO: 30.2 PG (ref 27–31)
MCHC RBC AUTO-ENTMCNC: 31.6 MG/DL (ref 33–36)
MCV RBC AUTO: 95.4 FL (ref 80–94)
MONOCYTES # BLD AUTO: 0.59 X10(3)/MCL (ref 0.1–1.3)
MONOCYTES NFR BLD AUTO: 8.2 %
NEUTROPHILS # BLD AUTO: 4.1 X10(3)/MCL (ref 2.1–9.2)
NEUTROPHILS NFR BLD AUTO: 56.5 %
NRBC BLD AUTO-RTO: 0 %
PLATELET # BLD AUTO: 200 X10(3)/MCL (ref 130–400)
PMV BLD AUTO: 9.7 FL (ref 7.4–10.4)
POTASSIUM SERPL-SCNC: 4.5 MMOL/L (ref 3.5–5.1)
PROT SERPL-MCNC: 7.2 GM/DL (ref 5.8–7.6)
RBC # BLD AUTO: 3.91 X10(6)/MCL (ref 4.7–6.1)
SODIUM SERPL-SCNC: 143 MMOL/L (ref 136–145)
WBC # SPEC AUTO: 7.2 X10(3)/MCL (ref 4.5–11.5)

## 2022-08-23 PROCEDURE — 25000003 PHARM REV CODE 250: Performed by: INTERNAL MEDICINE

## 2022-08-23 PROCEDURE — 97162 PT EVAL MOD COMPLEX 30 MIN: CPT

## 2022-08-23 PROCEDURE — 97167 OT EVAL HIGH COMPLEX 60 MIN: CPT

## 2022-08-23 PROCEDURE — 85025 COMPLETE CBC W/AUTO DIFF WBC: CPT | Performed by: NURSE PRACTITIONER

## 2022-08-23 PROCEDURE — S4991 NICOTINE PATCH NONLEGEND: HCPCS | Performed by: INTERNAL MEDICINE

## 2022-08-23 PROCEDURE — 63600175 PHARM REV CODE 636 W HCPCS: Performed by: INTERNAL MEDICINE

## 2022-08-23 PROCEDURE — 21400001 HC TELEMETRY ROOM

## 2022-08-23 PROCEDURE — 80053 COMPREHEN METABOLIC PANEL: CPT | Performed by: NURSE PRACTITIONER

## 2022-08-23 PROCEDURE — 36415 COLL VENOUS BLD VENIPUNCTURE: CPT | Performed by: NURSE PRACTITIONER

## 2022-08-23 RX ORDER — HYDROCODONE BITARTRATE AND ACETAMINOPHEN 10; 325 MG/1; MG/1
1 TABLET ORAL EVERY 4 HOURS PRN
Status: DISCONTINUED | OUTPATIENT
Start: 2022-08-23 | End: 2022-08-23

## 2022-08-23 RX ORDER — ALPRAZOLAM 0.25 MG/1
0.25 TABLET ORAL NIGHTLY PRN
Status: DISCONTINUED | OUTPATIENT
Start: 2022-08-23 | End: 2022-08-25 | Stop reason: HOSPADM

## 2022-08-23 RX ORDER — PREGABALIN 25 MG/1
75 CAPSULE ORAL 3 TIMES DAILY
Status: DISCONTINUED | OUTPATIENT
Start: 2022-08-23 | End: 2022-08-25 | Stop reason: HOSPADM

## 2022-08-23 RX ORDER — IBUPROFEN 200 MG
1 TABLET ORAL DAILY
Status: DISCONTINUED | OUTPATIENT
Start: 2022-08-23 | End: 2022-08-25 | Stop reason: HOSPADM

## 2022-08-23 RX ORDER — ATORVASTATIN CALCIUM 10 MG/1
20 TABLET, FILM COATED ORAL DAILY
Status: DISCONTINUED | OUTPATIENT
Start: 2022-08-23 | End: 2022-08-25 | Stop reason: HOSPADM

## 2022-08-23 RX ORDER — MEMANTINE HYDROCHLORIDE 5 MG/1
10 TABLET ORAL 2 TIMES DAILY
Status: DISCONTINUED | OUTPATIENT
Start: 2022-08-23 | End: 2022-08-25 | Stop reason: HOSPADM

## 2022-08-23 RX ORDER — DOCUSATE SODIUM 100 MG/1
100 CAPSULE, LIQUID FILLED ORAL 2 TIMES DAILY PRN
Status: DISCONTINUED | OUTPATIENT
Start: 2022-08-23 | End: 2022-08-25 | Stop reason: HOSPADM

## 2022-08-23 RX ORDER — LANOLIN ALCOHOL/MO/W.PET/CERES
1 CREAM (GRAM) TOPICAL
Status: DISCONTINUED | OUTPATIENT
Start: 2022-08-24 | End: 2022-08-25 | Stop reason: HOSPADM

## 2022-08-23 RX ORDER — LISINOPRIL 5 MG/1
5 TABLET ORAL DAILY
Status: DISCONTINUED | OUTPATIENT
Start: 2022-08-23 | End: 2022-08-25 | Stop reason: HOSPADM

## 2022-08-23 RX ORDER — SERTRALINE HYDROCHLORIDE 50 MG/1
50 TABLET, FILM COATED ORAL DAILY
Status: DISCONTINUED | OUTPATIENT
Start: 2022-08-24 | End: 2022-08-25 | Stop reason: HOSPADM

## 2022-08-23 RX ORDER — ENOXAPARIN SODIUM 100 MG/ML
40 INJECTION SUBCUTANEOUS EVERY 24 HOURS
Status: DISCONTINUED | OUTPATIENT
Start: 2022-08-23 | End: 2022-08-25 | Stop reason: HOSPADM

## 2022-08-23 RX ORDER — FAMOTIDINE 20 MG/1
20 TABLET, FILM COATED ORAL 2 TIMES DAILY
Status: DISCONTINUED | OUTPATIENT
Start: 2022-08-23 | End: 2022-08-25 | Stop reason: HOSPADM

## 2022-08-23 RX ORDER — BENZONATATE 100 MG/1
200 CAPSULE ORAL EVERY 8 HOURS PRN
Status: DISCONTINUED | OUTPATIENT
Start: 2022-08-23 | End: 2022-08-25 | Stop reason: HOSPADM

## 2022-08-23 RX ORDER — ONDANSETRON 2 MG/ML
4 INJECTION INTRAMUSCULAR; INTRAVENOUS EVERY 4 HOURS PRN
Status: DISCONTINUED | OUTPATIENT
Start: 2022-08-23 | End: 2022-08-25 | Stop reason: HOSPADM

## 2022-08-23 RX ORDER — HYDRALAZINE HYDROCHLORIDE 20 MG/ML
20 INJECTION INTRAMUSCULAR; INTRAVENOUS
Status: DISCONTINUED | OUTPATIENT
Start: 2022-08-23 | End: 2022-08-25 | Stop reason: HOSPADM

## 2022-08-23 RX ORDER — ACETAMINOPHEN 325 MG/1
325 TABLET ORAL EVERY 4 HOURS PRN
Status: DISCONTINUED | OUTPATIENT
Start: 2022-08-23 | End: 2022-08-25 | Stop reason: HOSPADM

## 2022-08-23 RX ORDER — IBUPROFEN 200 MG
1 TABLET ORAL DAILY
Status: DISCONTINUED | OUTPATIENT
Start: 2022-08-24 | End: 2022-08-23

## 2022-08-23 RX ORDER — HYDROCODONE BITARTRATE AND ACETAMINOPHEN 10; 325 MG/1; MG/1
1 TABLET ORAL EVERY 4 HOURS PRN
Status: DISCONTINUED | OUTPATIENT
Start: 2022-08-23 | End: 2022-08-25 | Stop reason: HOSPADM

## 2022-08-23 RX ADMIN — MEMANTINE 10 MG: 5 TABLET ORAL at 02:08

## 2022-08-23 RX ADMIN — ATORVASTATIN CALCIUM 20 MG: 10 TABLET, FILM COATED ORAL at 02:08

## 2022-08-23 RX ADMIN — HYDROCODONE BITARTRATE AND ACETAMINOPHEN 1 TABLET: 10; 325 TABLET ORAL at 04:08

## 2022-08-23 RX ADMIN — POTASSIUM CHLORIDE 50 ML/HR: 149 INJECTION, SOLUTION, CONCENTRATE INTRAVENOUS at 05:08

## 2022-08-23 RX ADMIN — PREGABALIN 75 MG: 25 CAPSULE ORAL at 09:08

## 2022-08-23 RX ADMIN — HYDROCODONE BITARTRATE AND ACETAMINOPHEN 1 TABLET: 10; 325 TABLET ORAL at 10:08

## 2022-08-23 RX ADMIN — FAMOTIDINE 20 MG: 20 TABLET, FILM COATED ORAL at 02:08

## 2022-08-23 RX ADMIN — LISINOPRIL 5 MG: 5 TABLET ORAL at 02:08

## 2022-08-23 RX ADMIN — FAMOTIDINE 20 MG: 20 TABLET, FILM COATED ORAL at 09:08

## 2022-08-23 RX ADMIN — HYDROCODONE BITARTRATE AND ACETAMINOPHEN 1 TABLET: 10; 325 TABLET ORAL at 11:08

## 2022-08-23 RX ADMIN — NICOTINE 1 PATCH: 21 PATCH, EXTENDED RELEASE TRANSDERMAL at 05:08

## 2022-08-23 RX ADMIN — THERA TABS 1 TABLET: TAB at 02:08

## 2022-08-23 RX ADMIN — ALPRAZOLAM 0.25 MG: 0.25 TABLET ORAL at 09:08

## 2022-08-23 RX ADMIN — ENOXAPARIN SODIUM 40 MG: 40 INJECTION SUBCUTANEOUS at 04:08

## 2022-08-23 RX ADMIN — MEMANTINE 10 MG: 5 TABLET ORAL at 09:08

## 2022-08-23 NOTE — PLAN OF CARE
Problem: Physical Therapy  Goal: Physical Therapy Goal  Description: Goals to be met by: 22     Patient will increase functional independence with mobility by performin. Sit to stand transfer with Modified [place]  2. Gait  x 200 feet with Modified [place] using Rolling Walker.     Outcome: Ongoing, Progressing

## 2022-08-23 NOTE — PLAN OF CARE
Problem: Occupational Therapy  Goal: Occupational Therapy Goal  Description: Patient will perform grooming with min assist while standing at sink.    Patient will perform toileting with mod assist using BSC    Patient will perform toilet transfer with min assist with use of RW    Patient will perform upper body dressing with standby assist while seated EOB     Patient will perform lower body dressing with min assist while seated EOB     Outcome: Ongoing, Progressing

## 2022-08-23 NOTE — CLINICAL REVIEW
77-year-old male admitted on 08/22/2022 with bilateral lower extremity weakness.  History of hypertension.  Previous history also included dementia.  CT scan of the head has been unremarkable.  Labs demonstrated bradycardia with heart rate reaching in the 40s.  Labs also demonstrated possible acute kidney injury versus chronic kidney disease.  Medication readjustments on going.  The patient received IV hydration.  Physical therapy/occupational therapy consulted.  Inpatient level of care is appropriate based on the [facility] Inpatient & Surgical Care 26th Edition (Publish Date 06/17/2022):  General Recovery Care > Body System General Recovery Guidelines >Neurology GRG (MG-N):     Collapse Neurologic finding requiring inpatient care, as indicated by 1 or more of the following(34)(35)(36)(37)(38)(39)(40):    Weakness that is progressive or severe (eg, inpatient care needed due to functional disability, concern for safety)(41)(42)    Royal Andre MD  Utilization Management  Physician Advisor

## 2022-08-23 NOTE — PT/OT/SLP EVAL
Physical Therapy Evaluation    Patient Name:  Rojas Foster   MRN:  [id number]    Recommendations:     Discharge Recommendations:  nursing facility, skilled, rehabilitation facility   Discharge Equipment Recommendations: walker, rolling   Barriers to discharge: impaired mobility    Assessment:     Rojas Foster is a 77 y.o. male admitted with a medical diagnosis of bilateral LE weakness, AMS.  He presents with the following impairments/functional limitations:  weakness, impaired endurance, impaired sensation, impaired functional mobility, gait instability, decreased lower extremity function, decreased safety awareness. Patient tolerated PT evaluation fairly, mobilizing w/ min-modA and RW. Patient with LLE weakness and altered sensation bilaterally. Patient unable to ambulate away from bed due to poor coordination and foot drag in LLE with attempted steps at EOB. Pt it appropriate for continued acute PT services to improve activity tolerance and independence with mobility.     Rehab Prognosis: Good; patient would benefit from acute skilled PT services to address these deficits and reach maximum level of function.    Recent Surgery: * No surgery found *      Plan:     During this hospitalization, patient to be seen 5 x/week to address the identified rehab impairments via gait training, therapeutic activities, therapeutic exercises, neuromuscular re-education and progress toward the following goals:    · Plan of Care Expires:  09/23/22    Subjective     Chief Complaint: none stated  Patient/Family Comments/goals: to get stronger  Pain/Comfort:  · Pain Rating 1: 0/10    Patients cultural, spiritual, Anabaptism conflicts given the current situation: no    Living Environment:  Pt lives in [facility] with wife.  Prior to admission, patients level of function was independent with rollator.  Equipment used at home: rollator.  DME owned (not currently used): none.  Upon discharge, patient will have assistance from  family.    Objective:     Communicated with RN prior to session.  Patient found HOB elevated with peripheral IV, pulse ox (continuous), telemetry  upon PT entry to room.    General Precautions: Standard, fall   Orthopedic Precautions:N/A   Braces: N/A  Respiratory Status: Room air    Exams:  · Sensation: -       Intact  · RLE ROM: WNL  · RLE Strength: Grossly 4/5  · LLE ROM: WNL  · LLE Strength: Grossly 3+/5    Functional Mobility:  · Bed Mobility:  Supine to Sit: minimum assistance  · Transfers:  Sit to Stand:  minimum assistance with rolling walker  · Gait: Pt able to take side steps at EOB with modA and RW, required assist for weight shifting and advancement of LLE    AM-PAC 6 CLICK MOBILITY  Total Score:16     Patient left HOB elevated with all lines intact, call button in reach and RN notified.    GOALS:   Multidisciplinary Problems     Physical Therapy Goals        Problem: Physical Therapy    Goal Priority Disciplines Outcome Goal Variances Interventions   Physical Therapy Goal     PT, PT/OT Ongoing, Progressing     Description: Goals to be met by: 22     Patient will increase functional independence with mobility by performin. Sit to stand transfer with Modified [place]  2. Gait  x 200 feet with Modified [place] using Rolling Walker.                      History:     Past Medical History:   Diagnosis Date    Arthritis     HTN (hypertension)        Past Surgical History:   Procedure Laterality Date    BACK SURGERY      SHOULDER SURGERY      SHOULDER SURGERY Left        Time Tracking:     PT Received On: 22  PT Start Time: 1405     PT Stop Time: 1420  PT Total Time (min): 15 min     Billable Minutes: Evaluation Mod complexity      2022

## 2022-08-23 NOTE — H&P
[facility] EMERGENCY DEPARTMENT    Hospital Medicine History & Physical Examination       Patient Name: Rojas Foster  MRN: [id number]  Patient Class: IP- Inpatient   Admission Date: 8/22/2022   Admitting Physician: WINSTON Service   Length of Stay: 1  Attending Physician: Maico Hagan MD  Primary Care Provider: Vishal Correa MD  Face-to-Face encounter date: 08/23/2022    Code Status: Not on file    Chief Complaint: Extremity Weakness (Non-traumatic bilat leg weakness)          HISTORY OF PRESENT ILLNESS:   Rojas Foster is a 77 y.o. male who  has a past medical history of HTN (hypertension).. The patient presented to [facility] on 8/22/2022 with a primary complaint of weakness.  Pt developed increasing weakness that became acutely worse x2day, wife says started Sunday evening and next morning was unable to get out of bed.  He has a history of dementia and noticed pt was more confused last couple days.  Today on my evaluation, family is at bedside and says he looks alittle better, responding more appropriate and able to lift legs off bed which he was unable to do yesterday.  Ct head negative, home meds have been on hold, he takes a few sedating meds which could be attributing to status.    PAST MEDICAL HISTORY:     Past Medical History:   Diagnosis Date    HTN (hypertension)        PAST SURGICAL HISTORY:     Past Surgical History:   Procedure Laterality Date    BACK SURGERY      SHOULDER SURGERY         ALLERGIES:   Codeine    FAMILY HISTORY:   family history includes Heart disease in his mother; Hypertension in his father and mother.    SOCIAL HISTORY:     Social History     Tobacco Use    Smoking status: Never Smoker    Smokeless tobacco: Current User     Types: Chew   Substance Use Topics    Alcohol use: Not Currently        HOME MEDICATIONS:     Prior to Admission medications    Medication Sig Start Date End Date Taking? Authorizing Provider   alendronate (FOSAMAX) 35  MG tablet Take 35 mg by mouth every 7 days.   Yes Historical Provider   atorvastatin (LIPITOR) 20 MG tablet Take 20 mg by mouth once daily. 7/19/22  Yes Historical Provider   ferrous sulfate (FEOSOL) Tab tablet Take 1 tablet by mouth daily with breakfast.   Yes Historical Provider   HYDROcodone-acetaminophen (NORCO)  mg per tablet Take 1 tablet by mouth. 8/18/22  Yes Historical Provider   magnesium 30 mg Tab Take by mouth once.   Yes Historical Provider   memantine (NAMENDA) 10 MG Tab Take 10 mg by mouth 2 (two) times daily. 7/7/22  Yes Historical Provider   multivitamin (THERAGRAN) tablet Take 1 tablet by mouth once daily.   Yes Historical Provider   pregabalin (LYRICA) 75 MG capsule Take 75 mg by mouth. 1/20/22  Yes Historical Provider   quinapril HCl (QUINAPRIL ORAL) Take 5 mg by mouth once daily.   Yes Historical Provider   sertraline (ZOLOFT) 50 MG tablet Take 50 mg by mouth once daily. 6/21/22  Yes Historical Provider   triamterene-hydrochlorothiazide 37.5-25 mg (DYAZIDE) 37.5-25 mg per capsule Take 1 capsule by mouth every morning.  8/23/22  Historical Provider       REVIEW OF SYSTEMS:   Except as documented, all other systems reviewed and negative   ROS      PHYSICAL EXAM:     VITAL SIGNS: 24 HRS MIN & MAX LAST   Temp  Min: 97.8 °F (36.6 °C)  Max: 98.6 °F (37 °C) 97.8 °F (36.6 °C)   BP  Min: 124/76  Max: 155/83 (!) 153/88   Pulse  Min: 46  Max: 75  (!) 59   Resp  Min: 12  Max: 20 16   SpO2  Min: 93 %  Max: 99 % 99 %       General appearance: Well-developed, well-nourished male in no apparent distress.  HENT: Atraumatic head. Moist mucous membranes of oral cavity.  Eyes: Normal extraocular movements.   Neck: Supple.   Lungs: Clear to auscultation bilaterally. No wheezing present.   Heart: Regular rate and rhythm. S1 and S2 present with no murmurs/gallop/rub. No pedal edema. No JVD present.   Abdomen: Soft, non-distended, non-tender. No rebound tenderness/guarding. Bowel sounds are normal.   Extremities:  No cyanosis, clubbing, or edema.  Skin: No Rash.   Neuro: Motor and sensory exams grossly intact. Good tone. No focal deficits, A & O x 3  Psych/mental status: Appropriate mood and affect. Responds appropriately to questions.     LABS AND IMAGING:     Recent Labs   Lab 08/22/22  1438 08/23/22  0426   WBC 6.3 7.2   RBC 4.06* 3.91*   HGB 12.5* 11.8*   HCT 39.1* 37.3*   MCV 96.3* 95.4*   MCH 30.8 30.2   MCHC 32.0* 31.6*   RDW 15.3 15.1    200   MPV 11.3* 9.7       Recent Labs   Lab 08/22/22  1438 08/23/22  0426    143   K 5.1 4.5   CO2 28 27   BUN 29.8* 25.7   CREATININE 1.45* 1.43*   CALCIUM 9.6 9.6   MG 2.50  --    ALBUMIN 4.3 3.8   ALKPHOS 95 85   ALT 18 17   AST 25 20   BILITOT 0.5 0.6       Microbiology Results (last 7 days)     ** No results found for the last 168 hours. **           CT Lumbar Spine Without Contrast  Narrative: EXAMINATION:  CT LUMBAR SPINE WITHOUT CONTRAST    CLINICAL HISTORY:  Low back pain, increased fracture risk;    TECHNIQUE:  Helical acquisition through the lumbar spine without IV contrast.  Three plane reconstructions were provided for review.  mGycm. Automatic exposure control, adjustment of mA/kV or iterative reconstruction technique was used to reduce radiation.    COMPARISON:  No priors    FINDINGS:  No significant alignment abnormality.  Moderate to severe compression deformities L1 and L2.  The L1 compression deformity is status post kyphoplasty with some epidural extension of the kyphoplasty cement.  There is nondisplaced fracture right inferior facet L4 image 43 series 6, this may be chronic.  Moderate degenerative changes of the lumbar spine elsewhere.  No high-grade spinal canal narrowing is evident.  There is neural foraminal narrowing probably were stat L1-L2 on the right.  Partially imaged distended urinary bladder.  Moderate atherosclerotic disease.  Impression: 1. Chronic compression deformities L1 and L2.  2. Nondisplaced fracture right inferior facet  L4 which could be chronic.    Electronically signed by: David Mccormick  Date:    08/22/2022  Time:    18:39  X-Ray Chest 1 View  Narrative: EXAMINATION:  XR CHEST 1 VIEW    CLINICAL HISTORY:  weakness;, .    FINDINGS:  No alveolar consolidation, effusion, or pneumothorax is seen.   There is some coiling of the thoracic aorta cardiac silhouette, central pulmonary vessels and mediastinum are normal in size and are grossly unremarkable.   visualized osseous structures are grossly unremarkable.  Impression: No acute chest disease is identified.    Electronically signed by: Yaya Bonds  Date:    08/22/2022  Time:    15:04  CT Head Without Contrast  Narrative: EXAMINATION:  CT HEAD WITHOUT CONTRAST    CLINICAL HISTORY:  Transient ischemic attack (TIA);    TECHNIQUE:  Low dose axial CT images obtained throughout the head without intravenous contrast. Sagittal and coronal reconstructions were performed.    COMPARISON:  09/14/2020    FINDINGS:  Intracranial compartment:    Ventricles and sulci are normal in size for age without evidence of hydrocephalus. No extra-axial blood or fluid collections.    The brain parenchyma appears normal. No parenchymal mass, hemorrhage, edema or major vascular distribution infarct.    Skull/extracranial contents (limited evaluation): No fracture. Mastoid air cells and paranasal sinuses are essentially clear.  Impression: No acute abnormality.  Age-appropriate cerebral and cerebellar volume loss with ex vacuo dilatation of the ventricles.  Changes of microangiopathy.    Electronically signed by: Srikanth Perez MD  Date:    08/22/2022  Time:    15:01        __________________________________________________________________________  INPATIENT LIST OF MEDICATIONS     Scheduled Meds:   atorvastatin  20 mg Oral Daily    enoxaparin  40 mg Subcutaneous Daily    famotidine  20 mg Oral BID    [START ON 8/24/2022] ferrous sulfate  1 tablet Oral Daily with breakfast    lisinopriL  5 mg Oral Daily     memantine  10 mg Oral BID    multivitamin  1 tablet Oral Daily     Continuous Infusions:  PRN Meds:HYDROcodone-acetaminophen          ASSESSMENT & PLAN:   Generalized weakness  Dementia  HTN  CKD    Plan    Consult PTOT  Resume appropriate home medsHold sedating meds    Gi proph: pepcid  Dvt proph: geraldo Hagan MD   08/23/2022

## 2022-08-23 NOTE — PT/OT/SLP EVAL
Occupational Therapy   Evaluation    Name: Rojas Foster  MRN: [id number]  Admitting Diagnosis:  Weakness (BLE, greater at L side than R)  Recent Surgery: * No surgery found *   lumbar decompression 2018, L shoulder sx    Recommendations:     Discharge Recommendations: home with home health, rehabilitation facility  Discharge Equipment Recommendations:  walker, rolling  Barriers to discharge:       Assessment:     Rojas Foster is a 77 y.o. male with a medical diagnosis of Weakness.  He presents with BLE weakness, LLE incoordination, L shoulder limited AROM d/t hx of shoulder sx, L hand  weakness, bilateral UE sensation deficits with hx of neuropathey, cognitive deficits with hx of dementia.  Pt is [place] but very pleasant and cooperative, motivated to return home with wife at mod I.  Pt's son in room to confirm PLOF mod I with use of rollator.   Performance deficits affecting function: weakness, impaired sensation, impaired self care skills, impaired functional mobility, impaired balance, impaired cognition, decreased lower extremity function, decreased safety awareness, pain, decreased ROM, impaired fine motor.      Rehab Prognosis: Good; patient would benefit from acute skilled OT services to address these deficits and reach maximum level of function.       Plan:     Patient to be seen 6 x/week, 4 x/week to address the above listed problems via self-care/home management, therapeutic activities, therapeutic exercises  · Plan of Care Expires: 09/06/22  · Plan of Care Reviewed with: patient, son    Subjective     Chief Complaint: BLE weakness  Patient/Family Comments/goals: return home with wife at mod I with rollator    Occupational Profile:  Living Environment: [facility] with wife  Previous level of function: mod I with rollator  Roles and Routines: pt's sons live nearby and check in frequently, able to assist part time as needed  Equipment Used at Home:  rollator, shower chair, bedside commode  Assistance upon  Discharge: TBD    Pain/Comfort:  · Pain Rating 1: 6/10  · Location - Orientation 1: lower  · Location 1: back  · Pain Addressed 1: Reposition, Distraction    Patients cultural, spiritual, Hoahaoism conflicts given the current situation: no    Objective:     Communicated with: nurse prior to session.  Patient found HOB elevated with telemetry, pulse ox (continuous) upon OT entry to room.    General Precautions: Standard, fall   Orthopedic Precautions:    Braces:    Respiratory Status: Room air  HR 54 at rest, 73 with activity, O2 95% throughout  Occupational Performance:    Bed Mobility:    · Patient completed Rolling/Turning to Right with stand by assistance  · Patient completed Scooting/Bridging with stand by assistance  · Patient completed Supine to Sit with stand by assistance  · Patient completed Sit to Supine with contact guard assistance    Functional Mobility/Transfers:  · Patient completed Sit <> Stand Transfer with minimum assistance  with  rolling walker   · Functional Mobility: mod A for sidestepping using RW; note LLE decreased coordination and difficulty with stepping but able to hold weight during stepping with RLE    Activities of Daily Living:  · Feeding:  stand by assistance .  · Grooming: minimum assistance seated in  bed  · Upper Body Dressing: moderate assistance limited by c/o back pain with o/h threading and mild limitations at L shoudler  · Lower Body Dressing: maximal assistance .  · Toileting: maximal assistance .    Cognitive/Visual Perceptual:  Cognitive/Psychosocial Skills:     -       Oriented to: Person, Place and Situation   -       Follows Commands/attention:Follows one-step commands  -       Safety awareness/insight to disability: impaired   Visual/Perceptual:      -Impaired  tracking and R/L discrimination .    Physical Exam:  Upper Extremity Range of Motion:     -       Right Upper Extremity: WNL  -       Left Upper Extremity: WFL except limited at shoulder to roughly 110 degrees  flxn/abd with hx of shoulder sx (pt unable to specify)  Upper Extremity Strength:    -       Right Upper Extremity: WFL  -       Left Upper Extremity: WFL except weakness at L hand  3/5    AMPAC 6 Click ADL:  AMPAC Total Score: 15    Treatment & Education:  Pt and son educated about purpose and POC for OT, goals of therapy, and do not get OOB without calling for assist from staff d/t fall risk.  Both verbalized agreement.  Education:    Patient left HOB elevated with call button in reach and son present    GOALS:   Multidisciplinary Problems     Occupational Therapy Goals        Problem: Occupational Therapy    Goal Priority Disciplines Outcome Interventions   Occupational Therapy Goal    High OT, PT/OT Ongoing, Progressing    Description: Patient will perform grooming with min assist while standing at sink.    Patient will perform toileting with mod assist using BSC    Patient will perform toilet transfer with min assist with use of RW    Patient will perform upper body dressing with standby assist while seated EOB     Patient will perform lower body dressing with min assist while seated EOB                      History:     Past Medical History:   Diagnosis Date    Arthritis     HTN (hypertension)        Past Surgical History:   Procedure Laterality Date    BACK SURGERY      SHOULDER SURGERY      SHOULDER SURGERY Left        Time Tracking:     OT Date of Treatment: 08/23/22  OT Start Time: 1320  OT Stop Time: 1340  OT Total Time (min): 20 min    Billable Minutes:Evaluation 20    8/23/2022

## 2022-08-23 NOTE — PLAN OF CARE
"/68   Pulse (!) 53   Temp 98.3 °F (36.8 °C) (Oral)   Resp 20   Ht 5' 8" (1.727 m)   Wt 98 kg (216 lb)   SpO2 (!) 94%   BMI 32.84 kg/m²     Problem: Adult Inpatient Plan of Care  Goal: Plan of Care Review  Outcome: Ongoing, Progressing  Goal: Patient-Specific Goal (Individualized)  Outcome: Ongoing, Progressing  Goal: Absence of Hospital-Acquired Illness or Injury  Outcome: Ongoing, Progressing  Goal: Optimal Comfort and Wellbeing  Outcome: Ongoing, Progressing  Goal: Readiness for Transition of Care  Outcome: Ongoing, Progressing     "

## 2022-08-23 NOTE — ED NOTES
"Attending Attestation:     Physician Attestation Statement for NP/PA:   I have conducted a face to face encounter with this patient in addition to the NP/PA due to Medical Complexity and I have performed substantive portions of the treatment.    Other NP/PA Attestation Additions:    Rojas Foster is a 77 y.o. male with No past medical history on file. presenting with progressive generalized weakness, onset was lower extremity weakness that started about 2 weeks ago and became rapidly worse last night to point where he is unable to stand.  On exam his upper extremities are also weak. Wife reports excessive sleepiness however mental state is unchanged    Vital signs:   BP (!) 142/88   Pulse (!) 53   Temp 97.8 °F (36.6 °C) (Oral)   Resp 20   Ht 5' 7" (1.702 m)   Wt 99.8 kg (220 lb)   SpO2 95%   BMI 34.46 kg/m² .      Physical Exam:  General:  Alert, no acute distress.   Skin: Normal for Ethnic Origin, No cyanosis  HEENT: Normocephalic and atraumatic, Vision unchanged, Pupils symmetric, No icterus , Nasal mucosa is pink and moist  Cardiovascular:  Regular rate and rhythm, No edema  Chest Wall: No deformity, equal chest rise  Respiratory:  Lungs are clear to auscultation, respirations are non-labored.    Musculoskeletal:  No deformity, Normal perfusion to all extremities, 4/5 strength bilateral upper and lower extremity  Back: No bony tenderness  : No suprapubic pain, No CVA tenderness  Gastrointestinal:  Soft, Non tender, Non distended, Normal bowel sounds.    Neurological:  Alert and oriented to person, place, time, and situation, no focal deficits, normal speech observed.    Psychiatric:  Cooperative, appropriate mood & affect.    Differential:  Electrolyte abnormality, hypoglycemia, infectious causes, endocrine abnormalities, medication side effect, dehydration, anemia, CVA, spinal cord abnormality, Guillain [place], neuromuscular junction disease, muscle disease      Workup:  Labs and imaging " negative    Plan:  Admit, rapidly progressive generalized weakness of unknown cause, spinal cord abnormality, Guillain [place], neuromuscular junction disease, muscle disease are all possible causes    Critical Care    Date/Time: 8/23/2022 12:22 AM  Performed by: Hema Ramsey DO  Authorized by: Vishal Correa MD   Total critical care time (exclusive of procedural time) : 65 minutes  Critical care time was exclusive of separately billable procedures and treating other patients and teaching time.  Critical care was necessary to treat or prevent imminent or life-threatening deterioration of the following conditions: CNS failure or compromise.  Critical care was time spent personally by me on the following activities: development of treatment plan with patient or surrogate, discussions with consultants, examination of patient, obtaining history from patient or surrogate, ordering and performing treatments and interventions, re-evaluation of patient's condition and review of old charts.  Subsequent provider of critical care: I assumed direction of critical care for this patient from another provider of my specialty.           Hema Ramsey DO  08/23/22 0023

## 2022-08-24 ENCOUNTER — TELEPHONE (OUTPATIENT)
Dept: NEUROLOGY | Facility: CLINIC | Age: 77
End: 2022-08-24
Payer: MEDICARE

## 2022-08-24 PROCEDURE — 25000003 PHARM REV CODE 250: Performed by: INTERNAL MEDICINE

## 2022-08-24 PROCEDURE — 97116 GAIT TRAINING THERAPY: CPT | Mod: CQ

## 2022-08-24 PROCEDURE — 21400001 HC TELEMETRY ROOM

## 2022-08-24 PROCEDURE — 63600175 PHARM REV CODE 636 W HCPCS: Performed by: INTERNAL MEDICINE

## 2022-08-24 PROCEDURE — S4991 NICOTINE PATCH NONLEGEND: HCPCS | Performed by: INTERNAL MEDICINE

## 2022-08-24 PROCEDURE — 97110 THERAPEUTIC EXERCISES: CPT | Mod: CQ

## 2022-08-24 RX ADMIN — ALPRAZOLAM 0.25 MG: 0.25 TABLET ORAL at 09:08

## 2022-08-24 RX ADMIN — PREGABALIN 75 MG: 25 CAPSULE ORAL at 02:08

## 2022-08-24 RX ADMIN — ATORVASTATIN CALCIUM 20 MG: 10 TABLET, FILM COATED ORAL at 08:08

## 2022-08-24 RX ADMIN — FERROUS SULFATE TAB 325 MG (65 MG ELEMENTAL FE) 1 EACH: 325 (65 FE) TAB at 08:08

## 2022-08-24 RX ADMIN — MEMANTINE 10 MG: 5 TABLET ORAL at 08:08

## 2022-08-24 RX ADMIN — THERA TABS 1 TABLET: TAB at 08:08

## 2022-08-24 RX ADMIN — ENOXAPARIN SODIUM 40 MG: 40 INJECTION SUBCUTANEOUS at 04:08

## 2022-08-24 RX ADMIN — POTASSIUM CHLORIDE 50 ML/HR: 149 INJECTION, SOLUTION, CONCENTRATE INTRAVENOUS at 04:08

## 2022-08-24 RX ADMIN — FAMOTIDINE 20 MG: 20 TABLET, FILM COATED ORAL at 09:08

## 2022-08-24 RX ADMIN — MEMANTINE 10 MG: 5 TABLET ORAL at 09:08

## 2022-08-24 RX ADMIN — PREGABALIN 75 MG: 25 CAPSULE ORAL at 09:08

## 2022-08-24 RX ADMIN — HYDROCODONE BITARTRATE AND ACETAMINOPHEN 1 TABLET: 10; 325 TABLET ORAL at 09:08

## 2022-08-24 RX ADMIN — NICOTINE 1 PATCH: 21 PATCH, EXTENDED RELEASE TRANSDERMAL at 09:08

## 2022-08-24 RX ADMIN — SERTRALINE HYDROCHLORIDE 50 MG: 50 TABLET ORAL at 09:08

## 2022-08-24 RX ADMIN — PREGABALIN 75 MG: 25 CAPSULE ORAL at 08:08

## 2022-08-24 RX ADMIN — FAMOTIDINE 20 MG: 20 TABLET, FILM COATED ORAL at 08:08

## 2022-08-24 RX ADMIN — LISINOPRIL 5 MG: 5 TABLET ORAL at 08:08

## 2022-08-24 NOTE — PT/OT/SLP PROGRESS
Physical Therapy         Treatment        Rojas Foster   MRN: [id number]     PT Received On: 22  PT Start Time: 1331     PT Stop Time: 1355    PT Total Time (min): 24 min       Billable Minutes:  Gait [id number] and Therapeutic Exercise 14  Total Minutes: 24    Treatment Type: Treatment  PT/PTA: PTA     PTA Visit Number: 1       General Precautions: Standard, fall  Orthopedic Precautions: Orthopedic Precautions : N/A   Braces:      Spiritual, Cultural Beliefs, Zoroastrian Practices, Values that Affect Care: no    Objective:  Patient found in bed upon entry.    Functional Mobility:  Bed Mobility:   Supine to sit: Minimal Assistance   Sit to supine: Minimal Assistance   Rolling: Minimal Assistance   Scooting: Minimal Assistance    Balance:     Transitional sit-to-stand: Min A with RW    Gait Training:  Patient gait trained 74ft  X 2 on level tile with Rolling Walker with Minimal Assistance. Pt presents with decreased VIVIAN step length and required assistance to steer RW. Impairments contributing to gait deviations include impaired balance and decreased strength    Additional Treatment:    Therapeutic Exercises   BLE: hip fleixon, LAQ, hip abd/add 2 X 15 reps in sitting.     Activity Tolerance:  Patient tolerated treatment well    Patient left HOB elevated with wife present.    Assessment:  Rojas Foster is a 77 y.o. male with a medical diagnosis of Weakness.     Rehab potential is excellent.    Activity tolerance: Excellent    Discharge recommendations: Discharge Facility/Level of Care Needs: rehabilitation facility     Equipment recommendations:       GOALS:   Multidisciplinary Problems     Physical Therapy Goals        Problem: Physical Therapy    Goal Priority Disciplines Outcome Goal Variances Interventions   Physical Therapy Goal     PT, PT/OT Ongoing, Progressing     Description: Goals to be met by: 22     Patient will increase functional independence with mobility by performin. Sit to stand  transfer with Modified [place]  2. Gait  x 200 feet with Modified [place] using Rolling Walker.                      PLAN:    Patient to be seen 5 x/week  to address the above listed problems via gait training, therapeutic activities, therapeutic exercises  Plan of Care expires: 09/23/22  Plan of Care reviewed with: patient         8/24/2022

## 2022-08-24 NOTE — PROGRESS NOTES
"Inpatient Nutrition Evaluation    Admit Date: 2022   Nutrition Recommendation/Prescription     Continue oral diet as tolerated.    Nutrition Assessment     Chart Review    Reason Seen: malnutrition screening tool    Diagnosis:  Generalized weakness  Dementia  HTN  CKD    Relevant Medical History: HTN    Nutrition-Related Medications: NS/KCl, famotidine, ferrous sulfate, multivitamin    Nutrition-Related Labs:  (22) Creat 1.43, rest of CMP WNL    Diet Order: Diet Cardiac  Oral Supplement Order: none at this time  Appetite/Oral Intake: good/% of meals  Factors Affecting Nutritional Intake: none identified at this time  Food/Pentecostal/Cultural Preferences: none reported       Wound(s):  none noted    Comments    22 Patient reports a good appetite, denies weight loss.    Anthropometrics    Height: 5' 8" (172.7 cm) Height Method: Measured  Last Weight: 98 kg (216 lb) (22 1257) Weight Method: Standard Scale  BMI (Calculated): 32.9  BMI Classification: obese grade I (BMI 30-34.9)        Ideal Body Weight (IBW), Male: 154 lb  % Ideal Body Weight, Male (lb): 140.26 %           Usual Body Weight (UBW), k.5 kg, per patient  % Usual Body Weight: 100.7    Wt Readings from Last 5 Encounters:   22 98 kg (216 lb)   21 100 kg (220 lb 7.4 oz)     Weight Change(s) Since Admission:  Admit Weight: 99.8 kg (220 lb) (22 1351)   98 kg (22)    Patient Education    Not applicable.    Monitoring & Evaluation     Dietitian will monitor food and beverage intake and weight.  Nutrition Risk/Follow-Up: low (follow-up in 5-7 days)  Patients assigned 'low nutrition risk' status do not qualify for a full nutritional assessment but will be monitored and re-evaluated in a 5-7 day time period.    "

## 2022-08-24 NOTE — TELEPHONE ENCOUNTER
Report pt had trouble walking 08/21/22, could not  his feet on 08/22/22, went to [facility] ED 08/22/22 and was admitted to the hospital on 08/23/22.   States a CT, blood work and other labs were done on pt. Reports one MD states this could be caused by Alzheimer's and another MD states the pt does not have this disease. States they are asking if Dr. Avery can see pt while in the hospital.     LOV: 01/20/22    NOV: 01/23/23

## 2022-08-24 NOTE — PROGRESS NOTES
[facility]                       [address] [id number]    PATIENT NAME:       MARISSA REYNA  YOB: 1945  CSN:                [id number]   MRN:                [id number]  ADMIT DATE:         08/22/2022 13:46:00  PHYSICIAN:          Vishal Correa MD                            PROGRESS NOTE    DATE:  08/24/2022 00:00:00    SUBJECTIVE:  The patient complains of lower extremity weakness.  He was able to   walk with assistance with physical therapy.  He has altered sensation, but no   paresthesias.  Reflexes are normal.  He is able to follow all commands.  He has   mild cognitive dysfunction.    PAST MEDICAL HISTORY:  Significant for hypertension, COPD, osteoporosis and   multiple compression fractures.    OBJECTIVE:  VITAL SIGNS:  Stable.  HEENT:  Within normal limits.  CHEST:  Clear to auscultation bilaterally.  HEART:  Normal S1, S2.   ABDOMEN:  Soft, nontender.  Positive bowel sounds.  EXTREMITIES:  No edema.  NEUROLOGIC:  Higher functions within normal limits.  Cranial nerves 2-12 grossly   intact.    MUSCULOSKELETAL: 3/5 bilateral muscle weakness since surgery.  Paresthesias, but   no hemianesthesia.  Reflexes normal.    PERTINENT LABS:  CT of the lumbar spine showed chronic compression fractures at   L1 and L2 with a nondisplaced fracture of the inferior facet of L4.      CT of the head showed mild cerebellar volume loss with ventricular dilatation   and microangiopathy.    IMPRESSION/RECOMMENDATION:    1. Bilateral lower extremity weakness, probably secondary to lumbar disk disease   and old compression fractures.  We will also get MRI of the lumbar spine.  2. History of microvascular disease and microangiopathy.  Will get a current MRI   and MRA of the brain.  3. Chronic obstructive pulmonary disease, stable.  4. Osteoporosis, on medications.  5. Multiple compression fractures. PT/OT therapy. The  patient will continue PT,   OT, and will require inpatient rehab         ______________________________  MD SAMPSON Almazan/ANDRÉS  DD:  08/24/2022  Time:  01:19PM  DT:  08/24/2022  Time:  02:39PM  Job #:  [id number]      PROGRESS NOTE

## 2022-08-24 NOTE — PLAN OF CARE
Problem: Adult Inpatient Plan of Care  Goal: Plan of Care Review  Outcome: Ongoing, Progressing     Problem: Pain Acute  Goal: Acceptable Pain Control and Functional Ability  Outcome: Ongoing, Progressing     Problem: Skin Injury Risk Increased  Goal: Skin Health and Integrity  Outcome: Ongoing, Progressing     Problem: Fall Injury Risk  Goal: Absence of Fall and Fall-Related Injury  Outcome: Ongoing, Progressing

## 2022-08-25 VITALS
SYSTOLIC BLOOD PRESSURE: 142 MMHG | WEIGHT: 216 LBS | TEMPERATURE: 98 F | BODY MASS INDEX: 32.74 KG/M2 | HEIGHT: 68 IN | HEART RATE: 85 BPM | RESPIRATION RATE: 18 BRPM | DIASTOLIC BLOOD PRESSURE: 84 MMHG | OXYGEN SATURATION: 92 %

## 2022-08-25 PROBLEM — E78.5 HYPERLIPIDEMIA: Status: ACTIVE | Noted: 2022-08-25

## 2022-08-25 PROBLEM — R41.3 AMNESIA: Status: ACTIVE | Noted: 2022-08-25

## 2022-08-25 PROBLEM — G89.29 CHRONIC BILATERAL LOW BACK PAIN: Status: ACTIVE | Noted: 2022-08-25

## 2022-08-25 PROBLEM — I10 PRIMARY HYPERTENSION: Status: ACTIVE | Noted: 2022-08-25

## 2022-08-25 PROBLEM — M79.2 NEUROPATHIC PAIN: Status: ACTIVE | Noted: 2021-11-28

## 2022-08-25 PROBLEM — F02.80 ALZHEIMER'S DISEASE: Status: ACTIVE | Noted: 2022-08-25

## 2022-08-25 PROBLEM — M54.9 CHRONIC BACK PAIN: Status: ACTIVE | Noted: 2022-08-25

## 2022-08-25 PROBLEM — M54.50 CHRONIC BILATERAL LOW BACK PAIN: Status: ACTIVE | Noted: 2022-08-25

## 2022-08-25 PROBLEM — G30.9 ALZHEIMER'S DISEASE: Status: ACTIVE | Noted: 2022-08-25

## 2022-08-25 PROBLEM — I10 HYPERTENSION: Status: ACTIVE | Noted: 2022-08-25

## 2022-08-25 PROBLEM — G89.29 CHRONIC BACK PAIN: Status: ACTIVE | Noted: 2022-08-25

## 2022-08-25 PROBLEM — E66.9 OBESITY: Status: ACTIVE | Noted: 2022-08-25

## 2022-08-25 PROBLEM — Z74.09 REDUCED MOBILITY: Status: ACTIVE | Noted: 2022-08-25

## 2022-08-25 PROBLEM — F41.9 ANXIETY: Status: ACTIVE | Noted: 2022-08-25

## 2022-08-25 LAB
ALBUMIN SERPL-MCNC: 4.1 GM/DL (ref 3.4–4.8)
ALBUMIN/GLOB SERPL: 1.2 RATIO (ref 1.1–2)
ALP SERPL-CCNC: 103 UNIT/L (ref 40–150)
ALT SERPL-CCNC: 16 UNIT/L (ref 0–55)
AST SERPL-CCNC: 18 UNIT/L (ref 5–34)
BASOPHILS # BLD AUTO: 0.02 X10(3)/MCL (ref 0–0.2)
BASOPHILS NFR BLD AUTO: 0.3 %
BILIRUBIN DIRECT+TOT PNL SERPL-MCNC: 0.6 MG/DL
BUN SERPL-MCNC: 21.1 MG/DL (ref 8.4–25.7)
CALCIUM SERPL-MCNC: 9.7 MG/DL (ref 8.8–10)
CHLORIDE SERPL-SCNC: 105 MMOL/L (ref 98–107)
CO2 SERPL-SCNC: 26 MMOL/L (ref 23–31)
CREAT SERPL-MCNC: 1.2 MG/DL (ref 0.73–1.18)
EOSINOPHIL # BLD AUTO: 0.37 X10(3)/MCL (ref 0–0.9)
EOSINOPHIL NFR BLD AUTO: 5.5 %
ERYTHROCYTE [DISTWIDTH] IN BLOOD BY AUTOMATED COUNT: 15.5 % (ref 11.5–17)
GFR SERPLBLD CREATININE-BSD FMLA CKD-EPI: >60 MLS/MIN/1.73/M2
GLOBULIN SER-MCNC: 3.3 GM/DL (ref 2.4–3.5)
GLUCOSE SERPL-MCNC: 88 MG/DL (ref 82–115)
HCT VFR BLD AUTO: 40.6 % (ref 42–52)
HGB BLD-MCNC: 12.7 GM/DL (ref 14–18)
IMM GRANULOCYTES # BLD AUTO: 0.03 X10(3)/MCL (ref 0–0.04)
IMM GRANULOCYTES NFR BLD AUTO: 0.4 %
LYMPHOCYTES # BLD AUTO: 1.98 X10(3)/MCL (ref 0.6–4.6)
LYMPHOCYTES NFR BLD AUTO: 29.2 %
MCH RBC QN AUTO: 30.3 PG (ref 27–31)
MCHC RBC AUTO-ENTMCNC: 31.3 MG/DL (ref 33–36)
MCV RBC AUTO: 96.9 FL (ref 80–94)
MONOCYTES # BLD AUTO: 0.58 X10(3)/MCL (ref 0.1–1.3)
MONOCYTES NFR BLD AUTO: 8.6 %
NEUTROPHILS # BLD AUTO: 3.8 X10(3)/MCL (ref 2.1–9.2)
NEUTROPHILS NFR BLD AUTO: 56 %
NRBC BLD AUTO-RTO: 0 %
PLATELET # BLD AUTO: 228 X10(3)/MCL (ref 130–400)
PMV BLD AUTO: 10 FL (ref 7.4–10.4)
POTASSIUM SERPL-SCNC: 4.1 MMOL/L (ref 3.5–5.1)
PROT SERPL-MCNC: 7.4 GM/DL (ref 5.8–7.6)
RBC # BLD AUTO: 4.19 X10(6)/MCL (ref 4.7–6.1)
SODIUM SERPL-SCNC: 141 MMOL/L (ref 136–145)
WBC # SPEC AUTO: 6.8 X10(3)/MCL (ref 4.5–11.5)

## 2022-08-25 PROCEDURE — 25000003 PHARM REV CODE 250: Performed by: INTERNAL MEDICINE

## 2022-08-25 PROCEDURE — 85025 COMPLETE CBC W/AUTO DIFF WBC: CPT | Performed by: INTERNAL MEDICINE

## 2022-08-25 PROCEDURE — 80053 COMPREHEN METABOLIC PANEL: CPT | Performed by: INTERNAL MEDICINE

## 2022-08-25 PROCEDURE — 36415 COLL VENOUS BLD VENIPUNCTURE: CPT | Performed by: INTERNAL MEDICINE

## 2022-08-25 PROCEDURE — 97116 GAIT TRAINING THERAPY: CPT | Mod: CQ

## 2022-08-25 PROCEDURE — S4991 NICOTINE PATCH NONLEGEND: HCPCS | Performed by: INTERNAL MEDICINE

## 2022-08-25 PROCEDURE — 97110 THERAPEUTIC EXERCISES: CPT | Mod: CQ

## 2022-08-25 RX ADMIN — FERROUS SULFATE TAB 325 MG (65 MG ELEMENTAL FE) 1 EACH: 325 (65 FE) TAB at 09:08

## 2022-08-25 RX ADMIN — SERTRALINE HYDROCHLORIDE 50 MG: 50 TABLET ORAL at 09:08

## 2022-08-25 RX ADMIN — PREGABALIN 75 MG: 25 CAPSULE ORAL at 09:08

## 2022-08-25 RX ADMIN — LISINOPRIL 5 MG: 5 TABLET ORAL at 09:08

## 2022-08-25 RX ADMIN — MEMANTINE 10 MG: 5 TABLET ORAL at 09:08

## 2022-08-25 RX ADMIN — NICOTINE 1 PATCH: 21 PATCH, EXTENDED RELEASE TRANSDERMAL at 09:08

## 2022-08-25 RX ADMIN — FAMOTIDINE 20 MG: 20 TABLET, FILM COATED ORAL at 09:08

## 2022-08-25 RX ADMIN — ATORVASTATIN CALCIUM 20 MG: 10 TABLET, FILM COATED ORAL at 09:08

## 2022-08-25 RX ADMIN — THERA TABS 1 TABLET: TAB at 09:08

## 2022-08-25 NOTE — PLAN OF CARE
Update pt on dc to [facility], he states his wife is loading the car and he will inform her of dc.

## 2022-08-25 NOTE — DISCHARGE SUMMARY
[facility]                       [address] [address]    PATIENT NAME:       MARISSA REYNA  YOB: 1945  CSN:                [id number]   MRN:                [id number]  ADMIT DATE:         08/22/2022 13:46:00  PHYSICIAN:          Vishal Correa MD                          DISCHARGE SUMMARY    DATE OF DISCHARGE:      FINAL DIAGNOSES:    1. Lower extremity weakness.   2. Chronic lumbar disk disease with spondylosis and facet fracture.    3. Hypertension.   4. Osteoporosis.   5. Hyperlipidemia.    6. Dementia.    HOSPITAL COURSE:  The patient is a 77-year-old white male, well known to me in   the past, who came in with lower extremity weakness.  He was worked up and   volume and metabolic parameters were within normal limits.  He had a CT of the   lumbar spine with chronic lumbar disk disease and a facet fracture with no   displacement of the cord.  I subsequently ordered an MRI, which did not show any   additional changes.  Spinal cord was intact.  He also had a CT of the head that   was unremarkable, and an MRI of the brain showing microvascular ischemia.  MRA   was normal.  He was admitted for intensive and inpatient therapy.  He responded   very well and was able to walk without much effort.  He is being transferred for   inpatient rehab facility    DISPOSITION:  Condition stable.    DIET:  2 g sodium diet.    MEDICATIONS:  Continue current home medicines.    FOLLOWUP:  He will follow up with my office in 1 week.  He is being transferred   to rehab facility.      ______________________________  Vishal Correa MD JPJ/AQS  DD:  08/25/2022  Time:  12:16PM  DT:  08/25/2022  Time:  01:15PM  Job #:  [id number]      DISCHARGE SUMMARY

## 2022-08-25 NOTE — PLAN OF CARE
[facility] accepted. Dr Baez accepting. Rm 121.  DC packet given to pt nurse with info who to call report to at 2:30.

## 2022-08-25 NOTE — PLAN OF CARE
DC orders and MAR sent to [facility] via [employer]. Message Katerin at [facility] to call me to give me accepting doc and who nurse can call report to.

## 2022-08-25 NOTE — DISCHARGE SUMMARY
[facility] - 4th Floor Medical Telemetry  Discharge Note  Short Stay    * No surgery found *    OUTCOME: good  DISPOSITION:   FINAL DIAGNOSIS:  Weakness    FOLLOWUP: keep appt with me    DISCHARGE INSTRUCTIONS:  No discharge procedures on file.      Clinical Reference Documents Added to Patient Instructions       Document    DEALING WITH DROWSINESS FROM THE DRUGS YOU TAKE (ENGLISH)          TIME SPENT ON DISCHARGE:45 minutes.discharge to rehab.keepa ppt with my office.[facility] physical rehab

## 2022-08-25 NOTE — PT/OT/SLP PROGRESS
Physical Therapy         Treatment        Rojas Foster   MRN: [id number]     PT Received On: 08/25/22  PT Start Time: 1029     PT Stop Time: 1056    PT Total Time (min): 27 min       Billable Minutes:  Gait [id number] and Therapeutic Exercise 17  Total Minutes: 27    Treatment Type: Treatment  PT/PTA: PTA     PTA Visit Number: 2       General Precautions: Standard, fall  Orthopedic Precautions: Orthopedic Precautions : N/A   Braces:      Spiritual, Cultural Beliefs, Islam Practices, Values that Affect Care: no    Objective:  Patient found in bed upon entry, with wife and family in room    Functional Mobility:  Bed Mobility:   Supine to sit: Moderate Assistance   Sit to supine: Moderate Assistance   Rolling: Moderate Assistance   Scooting: Moderate Assistance    Balance:     Transitional sit-to-stand: Min A with RW    Gait Training:  Patient gait trained 72ft X 2 on level tile with Rolling Walker with Moderate Assistance. Pt amb with step through gait pattern. Pt presents with decreased VIVIAN step length and required assistance to steer RW.Impairments contributing to gait deviations include impaired balance and decreased strength    Additional Treatment:    BLE: hip flexion, LAQ, hip abd/add x 15 reps in sitting    Strengthening/Endurance   Pt performed sit-to-stand 2 X 5 reps.     Activity Tolerance:  Patient tolerated treatment well    Patient left up in chair with wife present.    Assessment:  Rojas Foster is a 77 y.o. male with a medical diagnosis of Weakness.     Rehab potential is good.    Activity tolerance: Good    Discharge recommendations: Discharge Facility/Level of Care Needs: rehabilitation facility     Equipment recommendations:       GOALS:   Multidisciplinary Problems     Physical Therapy Goals        Problem: Physical Therapy    Goal Priority Disciplines Outcome Goal Variances Interventions   Physical Therapy Goal     PT, PT/OT Ongoing, Progressing     Description: Goals to be met by:  22     Patient will increase functional independence with mobility by performin. Sit to stand transfer with Modified Treasure  2. Gait  x 200 feet with Modified Treasure using Rolling Walker.                      PLAN:    Patient to be seen 5 x/week  to address the above listed problems via gait training, therapeutic activities, therapeutic exercises  Plan of Care expires: 22  Plan of Care reviewed with: patient, spouse         2022

## 2022-08-25 NOTE — PLAN OF CARE
08/25/22 1102   Medicare Message   Important Message from Medicare regarding Discharge Appeal Rights Given to patient/caregiver;Explained to patient/caregiver

## 2022-09-01 NOTE — PHYSICIAN QUERY
PT Name: Rojas Foster  MR #: [id number]     DOCUMENTATION CLARIFICATION      CDS/: Miguel Douglas RN            Contact information:  [email or web address]  This form is a permanent document in the medical record.     Query Date: September 1, 2022    By submitting this query, we are merely seeking further clarification of documentation.  Please utilize your independent clinical judgment when addressing the question(s) below.     The Medical Record contains the following:    Clinical Information Location in Medical Record   HOSPITAL COURSE:  The patient is a 77-year-old white male, well known to me in   the past, who came in with lower extremity weakness.  He was worked up and   volume and metabolic parameters were within normal limits.  He had a CT of the   lumbar spine with chronic lumbar disk disease and a facet fracture with no   displacement of the cord.  I subsequently ordered an MRI, which did not show any   additional changes.  Spinal cord was intact.  He also had a CT of the head that   was unremarkable, and an MRI of the brain showing microvascular ischemia.  MRA   was normal.  He was admitted for intensive and inpatient therapy.  He responded   very well and was able to walk without much effort.  He is being transferred for   inpatient rehab facility      77-year-old male admitted on 08/22/2022 with bilateral lower extremity weakness.  History of hypertension.  Previous history also included dementia.  CT scan of the head has been unremarkable.  Labs demonstrated bradycardia with heart rate reaching in the 40s.  Labs also demonstrated possible acute kidney injury versus chronic kidney disease.  Medication readjustments on going.  The patient received IV hydration    Weakness that is progressive or severe (eg, inpatient care needed due to functional disability, concern for safety     D/c Summary IM 8/25 (Sunny)                        Utilization Management (Jes) 8/23     Please  document your best medical opinion regarding the etiology of __ lower extremity weakness._____?(Check all that apply)       [  x ] Chronic lumbar disk disease with spondylosis and facet fracture   [   ] functional disability   [   ] Other etiology (please specify):___________________   [  ] Clinically Undetermined             Please document in your progress notes daily for the duration of treatment, until resolved, and include in your discharge summary.

## 2022-10-03 ENCOUNTER — LAB VISIT (OUTPATIENT)
Dept: LAB | Facility: HOSPITAL | Age: 77
End: 2022-10-03
Attending: INTERNAL MEDICINE
Payer: MEDICARE

## 2022-10-03 DIAGNOSIS — E78.2 MIXED HYPERLIPIDEMIA: ICD-10-CM

## 2022-10-03 DIAGNOSIS — D64.9 ANEMIA, UNSPECIFIED TYPE: ICD-10-CM

## 2022-10-03 DIAGNOSIS — Z00.01 ENCOUNTER FOR GENERAL ADULT MEDICAL EXAMINATION WITH ABNORMAL FINDINGS: ICD-10-CM

## 2022-10-03 DIAGNOSIS — I10 ESSENTIAL HYPERTENSION, MALIGNANT: Primary | ICD-10-CM

## 2022-10-03 DIAGNOSIS — R42 DIZZINESS AND GIDDINESS: ICD-10-CM

## 2022-10-03 LAB
ALBUMIN SERPL-MCNC: 4.1 GM/DL (ref 3.4–4.8)
ALBUMIN/GLOB SERPL: 1.4 RATIO (ref 1.1–2)
ALP SERPL-CCNC: 91 UNIT/L (ref 40–150)
ALT SERPL-CCNC: 19 UNIT/L (ref 0–55)
AST SERPL-CCNC: 19 UNIT/L (ref 5–34)
BASOPHILS # BLD AUTO: 0.03 X10(3)/MCL (ref 0–0.2)
BASOPHILS NFR BLD AUTO: 0.5 %
BILIRUBIN DIRECT+TOT PNL SERPL-MCNC: 0.5 MG/DL
BUN SERPL-MCNC: 22.8 MG/DL (ref 8.4–25.7)
CALCIUM SERPL-MCNC: 9.5 MG/DL (ref 8.8–10)
CHLORIDE SERPL-SCNC: 105 MMOL/L (ref 98–107)
CHOLEST SERPL-MCNC: 143 MG/DL
CHOLEST/HDLC SERPL: 4 {RATIO} (ref 0–5)
CK SERPL-CCNC: 78 U/L (ref 30–200)
CO2 SERPL-SCNC: 30 MMOL/L (ref 23–31)
CREAT SERPL-MCNC: 1.35 MG/DL (ref 0.73–1.18)
EOSINOPHIL # BLD AUTO: 0.32 X10(3)/MCL (ref 0–0.9)
EOSINOPHIL NFR BLD AUTO: 5.5 %
ERYTHROCYTE [DISTWIDTH] IN BLOOD BY AUTOMATED COUNT: 15 % (ref 11.5–17)
GFR SERPLBLD CREATININE-BSD FMLA CKD-EPI: 54 MLS/MIN/1.73/M2
GLOBULIN SER-MCNC: 3 GM/DL (ref 2.4–3.5)
GLUCOSE SERPL-MCNC: 85 MG/DL (ref 82–115)
HCT VFR BLD AUTO: 37.7 % (ref 42–52)
HDLC SERPL-MCNC: 40 MG/DL (ref 35–60)
HGB BLD-MCNC: 11.5 GM/DL (ref 14–18)
IMM GRANULOCYTES # BLD AUTO: 0.03 X10(3)/MCL (ref 0–0.04)
IMM GRANULOCYTES NFR BLD AUTO: 0.5 %
LDLC SERPL CALC-MCNC: 73 MG/DL (ref 50–140)
LYMPHOCYTES # BLD AUTO: 1.88 X10(3)/MCL (ref 0.6–4.6)
LYMPHOCYTES NFR BLD AUTO: 32.5 %
MCH RBC QN AUTO: 30.1 PG (ref 27–31)
MCHC RBC AUTO-ENTMCNC: 30.5 MG/DL (ref 33–36)
MCV RBC AUTO: 98.7 FL (ref 80–94)
MONOCYTES # BLD AUTO: 0.46 X10(3)/MCL (ref 0.1–1.3)
MONOCYTES NFR BLD AUTO: 7.9 %
NEUTROPHILS # BLD AUTO: 3.1 X10(3)/MCL (ref 2.1–9.2)
NEUTROPHILS NFR BLD AUTO: 53.1 %
NRBC BLD AUTO-RTO: 0 %
PLATELET # BLD AUTO: 233 X10(3)/MCL (ref 130–400)
PMV BLD AUTO: 10.6 FL (ref 7.4–10.4)
POTASSIUM SERPL-SCNC: 4.5 MMOL/L (ref 3.5–5.1)
PROT SERPL-MCNC: 7.1 GM/DL (ref 5.8–7.6)
RBC # BLD AUTO: 3.82 X10(6)/MCL (ref 4.7–6.1)
SODIUM SERPL-SCNC: 141 MMOL/L (ref 136–145)
TRIGL SERPL-MCNC: 148 MG/DL (ref 34–140)
TSH SERPL-ACNC: 2.6 UIU/ML (ref 0.35–4.94)
VLDLC SERPL CALC-MCNC: 30 MG/DL
WBC # SPEC AUTO: 5.8 X10(3)/MCL (ref 4.5–11.5)

## 2022-10-03 PROCEDURE — 80053 COMPREHEN METABOLIC PANEL: CPT

## 2022-10-03 PROCEDURE — 82550 ASSAY OF CK (CPK): CPT

## 2022-10-03 PROCEDURE — 84443 ASSAY THYROID STIM HORMONE: CPT

## 2022-10-03 PROCEDURE — 85025 COMPLETE CBC W/AUTO DIFF WBC: CPT

## 2022-10-03 PROCEDURE — 36415 COLL VENOUS BLD VENIPUNCTURE: CPT

## 2022-10-03 PROCEDURE — 80061 LIPID PANEL: CPT

## 2023-01-17 ENCOUNTER — HOSPITAL ENCOUNTER (INPATIENT)
Facility: HOSPITAL | Age: 78
LOS: 3 days | Discharge: NURSING, REHAB OR LONG-TERM CARE | DRG: 552 | End: 2023-01-20
Attending: INTERNAL MEDICINE | Admitting: INTERNAL MEDICINE
Payer: MEDICARE

## 2023-01-17 DIAGNOSIS — R53.1 GENERALIZED WEAKNESS: ICD-10-CM

## 2023-01-17 DIAGNOSIS — R53.1 WEAKNESS: Primary | ICD-10-CM

## 2023-01-17 LAB
ALBUMIN SERPL-MCNC: 4.3 G/DL (ref 3.4–4.8)
ALBUMIN/GLOB SERPL: 1 RATIO (ref 1.1–2)
ALP SERPL-CCNC: 100 UNIT/L (ref 40–150)
ALT SERPL-CCNC: 14 UNIT/L (ref 0–55)
APPEARANCE UR: CLEAR
AST SERPL-CCNC: 20 UNIT/L (ref 5–34)
BACTERIA #/AREA URNS AUTO: NORMAL /HPF
BASOPHILS # BLD AUTO: 0.02 X10(3)/MCL (ref 0–0.2)
BASOPHILS NFR BLD AUTO: 0.4 %
BILIRUB UR QL STRIP.AUTO: NEGATIVE MG/DL
BILIRUBIN DIRECT+TOT PNL SERPL-MCNC: 0.7 MG/DL
BUN SERPL-MCNC: 27.6 MG/DL (ref 8.4–25.7)
CALCIUM SERPL-MCNC: 10.2 MG/DL (ref 8.8–10)
CHLORIDE SERPL-SCNC: 105 MMOL/L (ref 98–107)
CO2 SERPL-SCNC: 25 MMOL/L (ref 23–31)
COLOR UR AUTO: YELLOW
CREAT SERPL-MCNC: 1.5 MG/DL (ref 0.73–1.18)
EOSINOPHIL # BLD AUTO: 0.62 X10(3)/MCL (ref 0–0.9)
EOSINOPHIL NFR BLD AUTO: 11.1 %
ERYTHROCYTE [DISTWIDTH] IN BLOOD BY AUTOMATED COUNT: 15 % (ref 11.5–17)
GFR SERPLBLD CREATININE-BSD FMLA CKD-EPI: 48 MLS/MIN/1.73/M2
GLOBULIN SER-MCNC: 4.1 GM/DL (ref 2.4–3.5)
GLUCOSE SERPL-MCNC: 84 MG/DL (ref 82–115)
GLUCOSE UR QL STRIP.AUTO: NEGATIVE MG/DL
HCT VFR BLD AUTO: 41.4 % (ref 42–52)
HGB BLD-MCNC: 13.1 GM/DL (ref 14–18)
IMM GRANULOCYTES # BLD AUTO: 0.02 X10(3)/MCL (ref 0–0.04)
IMM GRANULOCYTES NFR BLD AUTO: 0.4 %
KETONES UR QL STRIP.AUTO: NEGATIVE MG/DL
LEUKOCYTE ESTERASE UR QL STRIP.AUTO: NEGATIVE UNIT/L
LYMPHOCYTES # BLD AUTO: 1.46 X10(3)/MCL (ref 0.6–4.6)
LYMPHOCYTES NFR BLD AUTO: 26.2 %
MCH RBC QN AUTO: 29.7 PG
MCHC RBC AUTO-ENTMCNC: 31.6 MG/DL (ref 33–36)
MCV RBC AUTO: 93.9 FL (ref 80–94)
MONOCYTES # BLD AUTO: 0.43 X10(3)/MCL (ref 0.1–1.3)
MONOCYTES NFR BLD AUTO: 7.7 %
NEUTROPHILS # BLD AUTO: 3.03 X10(3)/MCL (ref 2.1–9.2)
NEUTROPHILS NFR BLD AUTO: 54.2 %
NITRITE UR QL STRIP.AUTO: NEGATIVE
NRBC BLD AUTO-RTO: 0 %
PH UR STRIP.AUTO: 7 [PH]
PLATELET # BLD AUTO: 217 X10(3)/MCL (ref 130–400)
PMV BLD AUTO: 10.7 FL (ref 7.4–10.4)
POTASSIUM SERPL-SCNC: 4.4 MMOL/L (ref 3.5–5.1)
PROT SERPL-MCNC: 8.4 GM/DL (ref 5.8–7.6)
PROT UR QL STRIP.AUTO: NEGATIVE MG/DL
RBC # BLD AUTO: 4.41 X10(6)/MCL (ref 4.7–6.1)
RBC #/AREA URNS AUTO: <5 /HPF
RBC UR QL AUTO: NEGATIVE UNIT/L
SODIUM SERPL-SCNC: 140 MMOL/L (ref 136–145)
SP GR UR STRIP.AUTO: 1.01 (ref 1–1.03)
SQUAMOUS #/AREA URNS AUTO: <5 /HPF
TROPONIN I SERPL-MCNC: <0.01 NG/ML (ref 0–0.04)
UROBILINOGEN UR STRIP-ACNC: 0.2 MG/DL
WBC # SPEC AUTO: 5.6 X10(3)/MCL (ref 4.5–11.5)
WBC #/AREA URNS AUTO: <5 /HPF

## 2023-01-17 PROCEDURE — 99285 EMERGENCY DEPT VISIT HI MDM: CPT | Mod: 25

## 2023-01-17 PROCEDURE — 85025 COMPLETE CBC W/AUTO DIFF WBC: CPT | Performed by: PHYSICIAN ASSISTANT

## 2023-01-17 PROCEDURE — 81001 URINALYSIS AUTO W/SCOPE: CPT | Performed by: PHYSICIAN ASSISTANT

## 2023-01-17 PROCEDURE — 80053 COMPREHEN METABOLIC PANEL: CPT | Performed by: PHYSICIAN ASSISTANT

## 2023-01-17 PROCEDURE — 11000001 HC ACUTE MED/SURG PRIVATE ROOM

## 2023-01-17 PROCEDURE — 25000003 PHARM REV CODE 250: Performed by: NURSE PRACTITIONER

## 2023-01-17 PROCEDURE — 84484 ASSAY OF TROPONIN QUANT: CPT | Performed by: PHYSICIAN ASSISTANT

## 2023-01-17 RX ORDER — SERTRALINE HYDROCHLORIDE 50 MG/1
50 TABLET, FILM COATED ORAL DAILY
Status: DISCONTINUED | OUTPATIENT
Start: 2023-01-18 | End: 2023-01-20 | Stop reason: HOSPADM

## 2023-01-17 RX ORDER — ALENDRONATE SODIUM 35 MG/1
35 TABLET ORAL
Status: DISCONTINUED | OUTPATIENT
Start: 2023-01-18 | End: 2023-01-20 | Stop reason: HOSPADM

## 2023-01-17 RX ORDER — PREGABALIN 75 MG/1
75 CAPSULE ORAL 3 TIMES DAILY
Status: DISCONTINUED | OUTPATIENT
Start: 2023-01-17 | End: 2023-01-20 | Stop reason: HOSPADM

## 2023-01-17 RX ORDER — LISINOPRIL 5 MG/1
5 TABLET ORAL DAILY
Status: DISCONTINUED | OUTPATIENT
Start: 2023-01-18 | End: 2023-01-20 | Stop reason: HOSPADM

## 2023-01-17 RX ORDER — HYDROCODONE BITARTRATE AND ACETAMINOPHEN 5; 325 MG/1; MG/1
1 TABLET ORAL
Status: COMPLETED | OUTPATIENT
Start: 2023-01-17 | End: 2023-01-17

## 2023-01-17 RX ORDER — MELOXICAM 15 MG/1
15 TABLET ORAL DAILY
Status: ON HOLD | COMMUNITY
Start: 2023-01-16 | End: 2023-01-20 | Stop reason: HOSPADM

## 2023-01-17 RX ORDER — MELOXICAM 7.5 MG/1
15 TABLET ORAL DAILY
Status: DISCONTINUED | OUTPATIENT
Start: 2023-01-18 | End: 2023-01-18

## 2023-01-17 RX ORDER — SODIUM CHLORIDE 0.9 % (FLUSH) 0.9 %
10 SYRINGE (ML) INJECTION
Status: DISCONTINUED | OUTPATIENT
Start: 2023-01-17 | End: 2023-01-20 | Stop reason: HOSPADM

## 2023-01-17 RX ORDER — LANOLIN ALCOHOL/MO/W.PET/CERES
400 CREAM (GRAM) TOPICAL ONCE
Status: COMPLETED | OUTPATIENT
Start: 2023-01-17 | End: 2023-01-17

## 2023-01-17 RX ORDER — HYDROCODONE BITARTRATE AND ACETAMINOPHEN 10; 325 MG/1; MG/1
1 TABLET ORAL 3 TIMES DAILY
Status: DISCONTINUED | OUTPATIENT
Start: 2023-01-17 | End: 2023-01-20 | Stop reason: HOSPADM

## 2023-01-17 RX ORDER — LANOLIN ALCOHOL/MO/W.PET/CERES
1 CREAM (GRAM) TOPICAL
Status: DISCONTINUED | OUTPATIENT
Start: 2023-01-18 | End: 2023-01-20 | Stop reason: HOSPADM

## 2023-01-17 RX ORDER — ATORVASTATIN CALCIUM 10 MG/1
20 TABLET, FILM COATED ORAL DAILY
Status: DISCONTINUED | OUTPATIENT
Start: 2023-01-18 | End: 2023-01-20 | Stop reason: HOSPADM

## 2023-01-17 RX ORDER — TALC
6 POWDER (GRAM) TOPICAL NIGHTLY PRN
Status: DISCONTINUED | OUTPATIENT
Start: 2023-01-17 | End: 2023-01-20 | Stop reason: HOSPADM

## 2023-01-17 RX ORDER — MEMANTINE HYDROCHLORIDE 5 MG/1
10 TABLET ORAL 2 TIMES DAILY
Status: DISCONTINUED | OUTPATIENT
Start: 2023-01-17 | End: 2023-01-20 | Stop reason: HOSPADM

## 2023-01-17 RX ADMIN — Medication 400 MG: at 10:01

## 2023-01-17 RX ADMIN — HYDROCODONE BITARTRATE AND ACETAMINOPHEN 1 TABLET: 5; 325 TABLET ORAL at 05:01

## 2023-01-17 RX ADMIN — MELATONIN TAB 3 MG 6 MG: 3 TAB at 10:01

## 2023-01-17 RX ADMIN — MEMANTINE HYDROCHLORIDE 10 MG: 5 TABLET ORAL at 10:01

## 2023-01-17 RX ADMIN — HYDROCODONE BITARTRATE AND ACETAMINOPHEN 1 TABLET: 10; 325 TABLET ORAL at 10:01

## 2023-01-17 RX ADMIN — PREGABALIN 75 MG: 75 CAPSULE ORAL at 10:01

## 2023-01-17 NOTE — FIRST PROVIDER EVALUATION
Medical screening examination initiated.  I have conducted a focused provider triage encounter, findings are as follows:    Chief Complaint   Patient presents with    Extremity Weakness     C/o bilateral leg weakness since august. Wife states she believes leg weakness is getting worse. Denies recent injury/trauma. Pt c/o lower back pain (chronic). Hx of alzheimer's. Gcs 14       Brief history of present illness:  77 y.o. male presents to the E.D. via EMS with complaints of worsening bilateral lower extremity weakness. Per EMS the patients wife reports he had a tia approximately one year ago, had some residual weakness, did physical therapy, improved and then worsened over the last two months. Patient has chronic back pain, but this has worsened. Patient denies any bladder or bowel incontinence. He denies any fever.    Vitals:    01/17/23 0942   BP: (!) 152/95   Pulse: (!) 55   Resp: 16   Temp: 97.9 °F (36.6 °C)   TempSrc: Oral   SpO2: 95%   Weight: 90.7 kg (200 lb)       Pertinent physical exam:  Awake, Alert, Oriented, Non labored breathing     Brief workup plan:  labs, imaging     Preliminary workup initiated; this workup will be continued and followed by the physician or advanced practice provider that is assigned to the patient when roomed.

## 2023-01-18 PROCEDURE — 25000003 PHARM REV CODE 250: Performed by: INTERNAL MEDICINE

## 2023-01-18 PROCEDURE — 63600175 PHARM REV CODE 636 W HCPCS: Performed by: INTERNAL MEDICINE

## 2023-01-18 PROCEDURE — 25000003 PHARM REV CODE 250: Performed by: NURSE PRACTITIONER

## 2023-01-18 PROCEDURE — 97162 PT EVAL MOD COMPLEX 30 MIN: CPT

## 2023-01-18 PROCEDURE — S4991 NICOTINE PATCH NONLEGEND: HCPCS | Performed by: INTERNAL MEDICINE

## 2023-01-18 PROCEDURE — 11000001 HC ACUTE MED/SURG PRIVATE ROOM

## 2023-01-18 RX ORDER — IBUPROFEN 200 MG
1 TABLET ORAL
Status: COMPLETED | OUTPATIENT
Start: 2023-01-18 | End: 2023-01-19

## 2023-01-18 RX ADMIN — LISINOPRIL 5 MG: 5 TABLET ORAL at 09:01

## 2023-01-18 RX ADMIN — RIVAROXABAN 10 MG: 10 TABLET, FILM COATED ORAL at 05:01

## 2023-01-18 RX ADMIN — PREGABALIN 75 MG: 75 CAPSULE ORAL at 08:01

## 2023-01-18 RX ADMIN — MELOXICAM 15 MG: 7.5 TABLET ORAL at 09:01

## 2023-01-18 RX ADMIN — MELATONIN TAB 3 MG 6 MG: 3 TAB at 08:01

## 2023-01-18 RX ADMIN — PREGABALIN 75 MG: 75 CAPSULE ORAL at 02:01

## 2023-01-18 RX ADMIN — POTASSIUM CHLORIDE 100 ML/HR: 149 INJECTION, SOLUTION, CONCENTRATE INTRAVENOUS at 05:01

## 2023-01-18 RX ADMIN — MEMANTINE HYDROCHLORIDE 10 MG: 5 TABLET ORAL at 09:01

## 2023-01-18 RX ADMIN — PREGABALIN 75 MG: 75 CAPSULE ORAL at 09:01

## 2023-01-18 RX ADMIN — HYDROCODONE BITARTRATE AND ACETAMINOPHEN 1 TABLET: 10; 325 TABLET ORAL at 02:01

## 2023-01-18 RX ADMIN — HYDROCODONE BITARTRATE AND ACETAMINOPHEN 1 TABLET: 10; 325 TABLET ORAL at 09:01

## 2023-01-18 RX ADMIN — SERTRALINE 50 MG: 50 TABLET, FILM COATED ORAL at 09:01

## 2023-01-18 RX ADMIN — HYDROCODONE BITARTRATE AND ACETAMINOPHEN 1 TABLET: 10; 325 TABLET ORAL at 08:01

## 2023-01-18 RX ADMIN — FERROUS SULFATE TAB 325 MG (65 MG ELEMENTAL FE) 1 EACH: 325 (65 FE) TAB at 09:01

## 2023-01-18 RX ADMIN — MEMANTINE HYDROCHLORIDE 10 MG: 5 TABLET ORAL at 08:01

## 2023-01-18 RX ADMIN — ATORVASTATIN CALCIUM 20 MG: 10 TABLET, FILM COATED ORAL at 09:01

## 2023-01-18 RX ADMIN — NICOTINE 1 PATCH: 21 PATCH, EXTENDED RELEASE TRANSDERMAL at 10:01

## 2023-01-18 NOTE — PROGRESS NOTES
[facility]                       [address] [address]    PATIENT NAME:       MARISSA REYNA  YOB: 1945  CSN:                [id number]   MRN:                [id number]  ADMIT DATE:         01/17/2023 10:11:00  PHYSICIAN:          Saurabh Dyer MD                            PROGRESS NOTE    DATE:      SUBJECTIVE:  This is a 77-year-old white male.  He was brought to the emergency   room after, for the last 3 or so days, being mobile.  He had some left weakness   that worsened, and significant back pain and increased confusion.  No fever or   chills.  No shortness of breath.  No chest pain or palpitations, or other   problems.    REVIEW OF SYSTEMS:  x12 as above.    OBJECTIVE:  VITAL SIGNS:  Blood pressure is 132/90, pulse 63, and temp 97.9.  GENERAL APPEARANCE:  He is alert, slightly confused, in no acute distress.  HEART:  Regular rhythm and rate.  LUNGS:  Clear.  ABDOMEN:  Soft, nontender.  EXTREMITIES:  Mild varicosities.  No clubbing, cyanosis.  NEUROLOGIC:  Unchanged.    LABS:  No new labs.    IMPRESSION:    1. Mental status change with increased confusion, decreased strength more in the   left side, secondary to leukoencephalomalacia and central lumbar spine   stenosis.    2. He has slight anemia, hypertension, dyslipidemia, history of depression,   anxiety, obesity.    PLAN:  Will continue with home medications except diuretics.  Will start PT, OT,   ST, and will get  to work on placement, rehab at [facility] versus SNF.    ______________________________  Saurabh Dyer MD    NEHEMIAS/AQS  DD:  01/18/2023  Time:  03:19PM  DT:  01/18/2023  Time:  03:51PM  Job #:  [id number]      PROGRESS NOTE

## 2023-01-18 NOTE — PT/OT/SLP EVAL
Physical Therapy Evaluation    Patient Name:  Rojas Foster   MRN:  [id number]    Recommendations:     Discharge Recommendations: rehabilitation facility   Discharge Equipment Recommendations: other (see comments) (to be determined at later date)   Barriers to discharge:  weakness, decreased safety, decreased fxn'l mobility    Assessment:     Rojas Foster is a 77 y.o. male admitted with a medical diagnosis of <principal problem not specified>.  He presents with the following impairments/functional limitations: weakness, impaired endurance, impaired functional mobility, gait instability, impaired balance, decreased safety awareness . Pt will be a good rehab candidate    Rehab Prognosis: Good; patient would benefit from acute skilled PT services to address these deficits and reach maximum level of function.    Recent Surgery: * No surgery found *      Plan:     During this hospitalization, patient to be seen 5 x/week to address the identified rehab impairments via gait training, therapeutic activities, therapeutic exercises and progress toward the following goals:    Plan of Care Expires:       Subjective     Chief Complaint:weakness  Patient/Family Comments/goals:wants to get stronger  Pain/Comfort:  Pain Rating 1: 0/10    Patients cultural, spiritual, Confucianism conflicts given the current situation:      Living Environment:  Pt lives in a single story house with no stairs. Lives with wife  Prior to admission, patients level of function was at least at a supervised  level  Equipment used at home: rollator, 3-in-1 commode.  DME owned (not currently used):  Upon discharge, patient will have assistance from wife.    Objective:     Communicated with nurse prior to session.  Patient found supine with    upon PT entry to room.    General Precautions: Standard, fall  Orthopedic Precautions:    Braces:    Respiratory Status: Room air    Exams:  Cognitive Exam:  Patient is oriented to Person and Place  RLE ROM:  WFL  RLE Strength: 3+/5  LLE ROM: WFL  LLE Strength: 3+/5    Functional Mobility:  Bed Mobility:     Rolling Left:  supervision  Rolling Right: supervision  Scooting: minimum assistance  Supine to Sit: minimum assistance  Sit to Supine: moderate assistance  Transfers:     Sit to Stand:  moderate assistance with rolling walker  Bed to Chair: moderate assistance with  rolling walker  using  Step Transfer  Gait: ambulated with a rw with min to moda due to pt needing help with turning rw and balance. Pt takes short steps especially withhis LLE  Balance: Static stand is fair, dynamic balance is poor  +      AM-PAC 6 CLICK MOBILITY  Total Score:        Treatment & Education:      Patient left supine with all lines intact and call button in reach.    GOALS:   Multidisciplinary Problems       Physical Therapy Goals          Problem: Physical Therapy    Goal Priority Disciplines Outcome Goal Variances Interventions   Physical Therapy Goal     PT, PT/OT      Description: Pt to be seen for therapy 5 times a week and will hopefully meet the goals below by 01-24-23  Goals  1. Pt will be sba with all bed mobility  2. Pt will be sba/cga with al;l transfers   3. Pt will be sba/cga with gait with a rw demonstrating a more normalized gait pattern                       History:     Past Medical History:   Diagnosis Date    Arthritis     HTN (hypertension)        Past Surgical History:   Procedure Laterality Date    BACK SURGERY      SHOULDER SURGERY      SHOULDER SURGERY Left        Time Tracking:     PT Received On:    PT Start Time: 0850     PT Stop Time: 0910  PT Total Time (min): 20 min     Billable Minutes: Evaluation 20 01/18/2023

## 2023-01-18 NOTE — PROGRESS NOTES
Met with patient to conduct initial dc planning assessment. Son and pt state he had a rehab stay in August of 2022 that got him back to his baseline, ambulating with a walker. Son states that PT worked with pt this morning and suggested another rehab stay, if so they would like [facility] or  [facility]. CM will continue following for DC needs through hospital course, will need to be medically ready, PT and OT notes for rehab referral.       01/18/23 1148   Discharge Assessment   Assessment Type Discharge Planning Assessment   Confirmed/corrected address, phone number and insurance Yes   Confirmed Demographics Correct on Facesheet   Source of Information patient;family   When was your last doctors appointment? 12/21/22  (PCP: Dr. Sanchez)   Communicated ALVARO with patient/caregiver Date not available/Unable to determine   Reason For Admission weakness   People in Home spouse   Do you expect to return to your current living situation? Yes   Do you have help at home or someone to help you manage your care at home? Yes   Who are your caregiver(s) and their phone number(s)? wife, Ivania [phone], son Michael   Prior to hospitilization cognitive status: Alert/Oriented   Current cognitive status: Alert/Oriented   Walking or Climbing Stairs ambulation difficulty, requires equipment   Mobility Management walker   Home Accessibility wheelchair accessible   Home Layout Able to live on 1st floor   Equipment Currently Used at Home walker, rolling;other (see comments)  (hearing aids)   Readmission within 30 days? No   Patient currently being followed by outpatient case management? No   Do you currently have service(s) that help you manage your care at home? No   Do you take prescription medications? Yes  (fills rx at [facility] [address])   Do you have prescription coverage? Yes   Coverage Medicare   Do you have any problems affording any of your prescribed medications? No   Is the patient taking medications as prescribed? yes   Who is  going to help you get home at discharge? family   How do you get to doctors appointments? family or friend will provide   Are you on dialysis? No   Do you take coumadin? No   Discharge Plan A Rehab   Discharge Plan B Skilled Nursing Facility   DME Needed Upon Discharge  none   Discharge Plan discussed with: Patient;Adult children   Discharge Barriers Identified None

## 2023-01-18 NOTE — ED PROVIDER NOTES
Encounter Date: 1/17/2023       History     Chief Complaint   Patient presents with    Extremity Weakness     C/o bilateral leg weakness since august. Wife states she believes leg weakness is getting worse. Denies recent injury/trauma. Pt c/o lower back pain (chronic). Hx of alzheimer's. Gcs 14     Patient's wife states that patient has had increasing weakness to his bilateral lower extremities, increased confusion from his norm, and fatigue x 2 days. States that patient has been unable to walk with his walker and patient's wife has been unable to assist him to the bedside commode or out of bed due to his increased weakness. Denies any fever, headache, chest pain, SOB, nausea, vomiting, or abdominal pain. Patient's wife states that patient had similar symptoms of increased weakness in August of 2022 and was admitted and sent to rehab and had improvement and was able to ambulate with his walker until yesterday.     The history is provided by the patient, the spouse and a relative. History limited by: Dementia.   Review of patient's allergies indicates:   Allergen Reactions    Codeine      Other reaction(s): Confusion     Past Medical History:   Diagnosis Date    Arthritis     HTN (hypertension)      Past Surgical History:   Procedure Laterality Date    BACK SURGERY      SHOULDER SURGERY      SHOULDER SURGERY Left      Family History   Problem Relation Age of Onset    Heart disease Mother     Hypertension Mother     Hypertension Father      Social History     Tobacco Use    Smoking status: Never    Smokeless tobacco: Current     Types: Chew   Substance Use Topics    Alcohol use: Not Currently    Drug use: Never     Review of Systems   Constitutional: Negative.  Negative for chills and fever.   HENT: Negative.     Eyes: Negative.    Respiratory: Negative.     Cardiovascular: Negative.    Gastrointestinal: Negative.    Endocrine: Negative.    Genitourinary: Negative.    Musculoskeletal: Negative.    Skin: Negative.     Allergic/Immunologic: Negative.    Neurological:  Positive for weakness.   Hematological: Negative.    Psychiatric/Behavioral: Negative.     All other systems reviewed and are negative.    Physical Exam     Initial Vitals [01/17/23 0942]   BP Pulse Resp Temp SpO2   (!) 152/95 (!) 55 16 97.9 °F (36.6 °C) 95 %      MAP       --         Physical Exam    Constitutional: He appears well-developed.   HENT:   Head: Normocephalic and atraumatic.   Eyes: Conjunctivae and EOM are normal. Pupils are equal, round, and reactive to light.   Neck: Neck supple.   Normal range of motion.  Cardiovascular:  Normal rate, regular rhythm, normal heart sounds and intact distal pulses.           Pulmonary/Chest: Breath sounds normal. No respiratory distress.   Abdominal: Abdomen is soft. Bowel sounds are normal. He exhibits no distension. There is no abdominal tenderness.   Musculoskeletal:      Cervical back: Normal range of motion and neck supple.     Neurological: He is alert. GCS eye subscore is 4. GCS verbal subscore is 5. GCS motor subscore is 6.   Patient bilateral upper extremities have equal hand grasps and strength is 5/5. Patient is able to elevate each lower extremity approximatly 1 to 2 inches off of the bed. Patient is unable to stand upright on his own and when assisted upright he immediately starts to fall to the side. Patient is without any facial droop.   Skin: Skin is warm and dry.   Psychiatric: He has a normal mood and affect.       ED Course   Procedures  Labs Reviewed   COMPREHENSIVE METABOLIC PANEL - Abnormal; Notable for the following components:       Result Value    Blood Urea Nitrogen 27.6 (*)     Creatinine 1.50 (*)     Calcium Level Total 10.2 (*)     Protein Total 8.4 (*)     Globulin 4.1 (*)     Albumin/Globulin Ratio 1.0 (*)     All other components within normal limits   CBC WITH DIFFERENTIAL - Abnormal; Notable for the following components:    RBC 4.41 (*)     Hgb 13.1 (*)     Hct 41.4 (*)     MCHC 31.6  (*)     MPV 10.7 (*)     All other components within normal limits   TROPONIN I - Normal   URINALYSIS, REFLEX TO URINE CULTURE - Normal   URINALYSIS, MICROSCOPIC - Normal   CBC W/ AUTO DIFFERENTIAL    Narrative:     The following orders were created for panel order CBC auto differential.  Procedure                               Abnormality         Status                     ---------                               -----------         ------                     CBC with Differential[[id number]]        Abnormal            Final result                 Please view results for these tests on the individual orders.          Imaging Results              CT Head Without Contrast (Final result)  Result time 01/17/23 18:55:20      Final result by Vi Leach MD (01/17/23 18:55:20)                   Impression:      1. No acute intracranial abnormality.  2. Chronic microvascular ischemic changes.      Electronically signed by: Vi Leach  Date:    01/17/2023  Time:    18:55               Narrative:    EXAMINATION:  CT HEAD WITHOUT CONTRAST    CLINICAL HISTORY:  Neuro deficit, acute, stroke suspected;    TECHNIQUE:  Axial scans were obtained from skull base to the vertex.    Coronal and sagittal reconstructions obtained from the axial data.    Automatic exposure control was utilized to limit radiation dose.    Contrast: None    Radiation Dose:    Total DLP: 1341 mGy*cm    COMPARISON:  MRI brain dated 08/24/2022, CT head dated 08/22/2022    FINDINGS:  There is no acute intracranial hemorrhage or edema. The gray-white matter differentiation is preserved.  There are patchy hypodensities in the subcortical and periventricular white matter, similar to the prior exam.    There is no mass effect or midline shift.  There is diffuse parenchymal volume loss with similar size of the ventricles.  The basal cisterns are patent. There is no abnormal extra-axial fluid collection.  Carotid artery calcifications are noted.    The  calvarium and skull base are intact. The visualized paranasal sinuses and the mastoid air cells are clear.                                       X-Ray Lumbar Spine Ap And Lateral (Final result)  Result time 01/17/23 12:10:01      Final result by Andi Alcazar MD (01/17/23 12:10:01)                   Impression:      Degenerative disc disease and spondylosis.      Electronically signed by: Andi Alcazar  Date:    01/17/2023  Time:    12:10               Narrative:    EXAMINATION:  XR LUMBAR SPINE AP AND LATERAL    CLINICAL HISTORY:  bilateral leg weakness;    TECHNIQUE:  Two-view    COMPARISON:  CT lumbar spine August 22, 2022    FINDINGS:  There are chronic compression deformities of T12, L1 and L2 vertebral bodies without significant change.  There has been previous kyphoplasty of L1 with cement also within the L1-L2 intervertebral disc space and also the ventral epidural space as before.  Demineralization of bones.  Lumbar dextroscoliotic curvature centered at L1-L2.  There are intervertebral disc space degenerative changes more apparent at the level of the T12-L1, L1-L2 and L2-L3.  There is multilevel facet arthropathy most notable at L5-S1 level.                                       Medications   sodium chloride 0.9% flush 10 mL (has no administration in time range)   alendronate tablet 35 mg (has no administration in time range)   atorvastatin tablet 20 mg (has no administration in time range)   ferrous sulfate tablet 1 each (has no administration in time range)   HYDROcodone-acetaminophen  mg per tablet 1 tablet (1 tablet Oral Given 1/17/23 2217)   meloxicam tablet 15 mg (has no administration in time range)   memantine tablet 10 mg (10 mg Oral Given 1/17/23 2217)   pregabalin capsule 75 mg (75 mg Oral Given 1/17/23 2216)   sertraline tablet 50 mg (has no administration in time range)   lisinopriL tablet 5 mg (has no administration in time range)   melatonin tablet 6 mg (6 mg Oral Given 1/17/23 2256)    HYDROcodone-acetaminophen 5-325 mg per tablet 1 tablet (1 tablet Oral Given 1/17/23 1730)   magnesium oxide tablet 400 mg (400 mg Oral Given 1/17/23 2216)     Medical Decision Making:   History:   Old Records Summarized: records from previous admission(s).       <> Summary of Records: Reviewed records from previous admission in 08/2022.  Initial Assessment:   Patient's wife states that patient has had increasing weakness to his bilateral lower extremities, increased confusion from his norm, and fatigue x 2 days. States that patient has been unable to walk with his walker and patient's wife has been unable to assist him to the bedside commode or out of bed due to his increased weakness. Denies any fever, headache, chest pain, SOB, nausea, vomiting, or abdominal pain. Patient's wife states that patient had similar symptoms of increased weakness in August of 2022 and was admitted and sent to rehab and had improvement and was able to ambulate with his walker until yesterday.     The history is provided by the patient, the spouse and a relative. History limited by: Dementia.     Patient is awake, afebrile, and nontoxic appearing in the ED. Patient is unable to stand upright on his own.   Differential Diagnosis:   Weakness, CVA  Clinical Tests:   Lab Tests: Ordered and Reviewed  Radiological Study: Ordered and Reviewed  ED Management:  MDM  Co-morbidities and/or factors adding to the complexity or risk for the patient?: Alzheimer's, HTN, Chronic Back Pain   Differential diagnoses: see above   Chart Review (nursing home, outside records, CareEverywhere): see above   Labs/tests intepretation: Patient's labs are unremarkable, Lumbar x-rays show chronic changes, Head CT shows no acute findings.   Treatment/interventions: Discussed patient with Dr. Bernal and he assessed patient in the ED. Will admit patient for weakness.  Shared decision making: yes   Dispo: Admit           Other:   I have discussed this case with another  health care provider.       <> Summary of the Discussion: At 2000 spoke to Dr. Correa. OK to admit to service.            ED Course as of 01/18/23 0246   Tue Jan 17, 2023 2026 I, Dr Bernal, saw this patient with the midlevel provider. I had face to face time with patient. I performed an independent history and physical examination and was involved in substantive portion of medical decision making.         77-year-old gentleman with Alzheimer's dementia brought to the ED by family because of lower extremity weakness bilaterally.  This has been a chronic issue temporarily relieved after he went to rehab for 2 weeks.  Now starting today patient is unable to stand or ambulate.  On exam he has 5/5 flexion-extension in the ankles and great toes.  He is able to elevate each leg about 2 in off the bed.  He is not able to stand upright on his own.  Nurses stood the patient up on the side of the bed and he is unable to hold himself upright and falls to the side immediately.  CT of the brain is no acute findings.  X-ray of the lumbar spine has no acute findings.  This is likely chronic debility related to his age and comorbidities.  I discussed with the family they may need to consider a long-term solution such as a nursing home July expect this will be a recurring problem [LF]      ED Course User Index  [LF] Luis Manuel Bernal MD                 Clinical Impression:   Final diagnoses:  [R53.1] Weakness (Primary)  [R53.1] Generalized weakness        ED Disposition Condition    Admit Stable                ILIANA Jensen  01/18/23 0246

## 2023-01-18 NOTE — H&P
[facility]                       [address] [id number]    PATIENT NAME:       MARISSA REYNA  YOB: 1945  CSN:                [id number]   MRN:                [id number]  ADMIT DATE:         01/17/2023 10:11:00  PHYSICIAN:          Saurabh Dyer MD                        HISTORY AND PHYSICAL      HISTORY OF PRESENT ILLNESS:  A 77-year-old white male.  He was doing fairly   decent up to 3 days ago, when he started to present with some left-sided   weakness and he was unable to ambulate like he was before.  He had some   confusion.  He denies having any headaches.  No fever or chills.  No shortness   of breath.  No chest pain or palpitations.  He does have chronic back pain.  He   was able to do well after he had a similar episode about 4-5 months ago.  No   other significant issues other than increased confusion, fatigue.    REVIEW OF SYSTEMS:  x12.    PAST MEDICAL HISTORY:  Remarkable for hypertension, chronic back pain, dementia,   peripheral neuropathy, obesity, anxiety, depression, osteopenia, dyslipidemia,   osteoarthritis, extensive cerebral leukoencephalopathy and chronic microvascular   ischemia.  There is some ventriculomegaly.  He does have significant arthritis   of the spine, moderate spinal stenosis at L1-L2, L2-L3 and L4-5, and chronic   compression deformities.    PAST SURGICAL HISTORY:  Includes back surgery, shoulder surgery.    SOCIAL HISTORY:  Never smoker.  Never uses alcohol in excess.  No illegal drugs.    He is .    FAMILY HISTORY:  Remarkable for heart disease in his mother and hypertension, as   well as hypertension in his daddy.    ALLERGIES:  HE HAS NO KNOWN DRUG ALLERGIES OTHER THAN .     MEDICATIONS:  Include:  1. Fosamax.  2. Atorvastatin.  3. Fergon.   4. Norco.  5. Namenda.  6. Lyrica.  7. Sertraline.  8. Maxzide.    PHYSICAL EXAMINATION:  VITAL SIGNS:  Blood  pressure is 152/95, pulse is 55, temp 97.9.  GENERAL APPEARANCE:  He is a little confused, but in no acute distress.    HEENT:  Normocephalic, atraumatic.  PERRLA, EOMI.    NECK:  Supple.  There is no JVD, no bruits.  HEART:  Has a regular rhythm and rate.  LUNGS:  Clear.    ABDOMEN:  Obese, soft,  nontender.  Bowel sounds positive.  GENITORECTAL:  No discharge.  Normal for age.    EXTREMITIES:  There is no clubbing, cyanosis, or edema.  He has mild   varicosities.  NEUROLOGIC:  Nonfocal.  Cranial nerves 2 through 12 appear intact.    LABS:  Remarkable for a slight increase in BUN and creatinine at 27.6 and 1.5,   calcium 10.2, albumin 4.3.  Troponin is less than 0.1.  CBC was remarkable for   an H and H of 13.1 and 41.4.  Urine was unremarkable.    CT of the head was remarkable for above, and lumbar spine remarkable for above.    IMPRESSION:    1. 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    2. He has history of hypertension, dyslipidemia, Alzheimer's, depression,   anxiety, chronic back pain.    3. He has slight dehydration.    PLAN:  Will admit the patient to the hospital.  Will continue medications except   diuretics.  Will go ahead and refer to  for possible placement,   rehab versus LTAC.  Will try [facility].        ______________________________  MD NEHEMIAS Al/ANDRÉS  DD:  01/18/2023  Time:  03:19PM  DT:  01/18/2023  Time:  03:43PM  Job #:  [id number]      HISTORY AND PHYSICAL

## 2023-01-19 PROCEDURE — 97166 OT EVAL MOD COMPLEX 45 MIN: CPT

## 2023-01-19 PROCEDURE — 25000003 PHARM REV CODE 250: Performed by: INTERNAL MEDICINE

## 2023-01-19 PROCEDURE — 92523 SPEECH SOUND LANG COMPREHEN: CPT

## 2023-01-19 PROCEDURE — 97116 GAIT TRAINING THERAPY: CPT | Mod: CQ

## 2023-01-19 PROCEDURE — 97530 THERAPEUTIC ACTIVITIES: CPT | Mod: CQ

## 2023-01-19 PROCEDURE — 25000003 PHARM REV CODE 250: Performed by: NURSE PRACTITIONER

## 2023-01-19 PROCEDURE — 63600175 PHARM REV CODE 636 W HCPCS: Performed by: INTERNAL MEDICINE

## 2023-01-19 PROCEDURE — 11000001 HC ACUTE MED/SURG PRIVATE ROOM

## 2023-01-19 RX ORDER — IBUPROFEN 200 MG
1 TABLET ORAL DAILY
Status: DISCONTINUED | OUTPATIENT
Start: 2023-01-20 | End: 2023-01-20 | Stop reason: HOSPADM

## 2023-01-19 RX ORDER — SODIUM CHLORIDE AND POTASSIUM CHLORIDE 150; 450 MG/100ML; MG/100ML
INJECTION, SOLUTION INTRAVENOUS CONTINUOUS
Status: DISCONTINUED | OUTPATIENT
Start: 2023-01-19 | End: 2023-01-20 | Stop reason: HOSPADM

## 2023-01-19 RX ADMIN — MELATONIN TAB 3 MG 6 MG: 3 TAB at 08:01

## 2023-01-19 RX ADMIN — SERTRALINE 50 MG: 50 TABLET, FILM COATED ORAL at 09:01

## 2023-01-19 RX ADMIN — PREGABALIN 75 MG: 75 CAPSULE ORAL at 09:01

## 2023-01-19 RX ADMIN — MEMANTINE HYDROCHLORIDE 10 MG: 5 TABLET ORAL at 09:01

## 2023-01-19 RX ADMIN — ATORVASTATIN CALCIUM 20 MG: 10 TABLET, FILM COATED ORAL at 09:01

## 2023-01-19 RX ADMIN — MEMANTINE HYDROCHLORIDE 10 MG: 5 TABLET ORAL at 08:01

## 2023-01-19 RX ADMIN — HYDROCODONE BITARTRATE AND ACETAMINOPHEN 1 TABLET: 10; 325 TABLET ORAL at 02:01

## 2023-01-19 RX ADMIN — HYDROCODONE BITARTRATE AND ACETAMINOPHEN 1 TABLET: 10; 325 TABLET ORAL at 08:01

## 2023-01-19 RX ADMIN — PREGABALIN 75 MG: 75 CAPSULE ORAL at 08:01

## 2023-01-19 RX ADMIN — RIVAROXABAN 10 MG: 10 TABLET, FILM COATED ORAL at 04:01

## 2023-01-19 RX ADMIN — LISINOPRIL 5 MG: 5 TABLET ORAL at 09:01

## 2023-01-19 RX ADMIN — PREGABALIN 75 MG: 75 CAPSULE ORAL at 02:01

## 2023-01-19 RX ADMIN — FERROUS SULFATE TAB 325 MG (65 MG ELEMENTAL FE) 1 EACH: 325 (65 FE) TAB at 09:01

## 2023-01-19 RX ADMIN — POTASSIUM CHLORIDE AND SODIUM CHLORIDE: 450; 150 INJECTION, SOLUTION INTRAVENOUS at 04:01

## 2023-01-19 NOTE — PT/OT/SLP PROGRESS
Physical Therapy Treatment    Patient Name:  Rojas Foster   MRN:  [id number]    Recommendations:     Discharge Recommendations: rehabilitation facility  Discharge Equipment Recommendations: other (see comments) (to be determined at later date)  Barriers to discharge: None    Assessment:     Rojas Foster is a 77 y.o. male admitted with a medical diagnosis of <principal problem not specified>.  He presents with the following impairments/functional limitations: weakness, gait instability, impaired endurance, impaired balance, decreased safety awareness, impaired cognition, impaired functional mobility .    Rehab Prognosis: Good; patient would benefit from acute skilled PT services to address these deficits and reach maximum level of function.    Recent Surgery: * No surgery found *      Plan:     During this hospitalization, patient to be seen 5 x/week to address the identified rehab impairments via gait training, therapeutic activities and progress toward the following goals:    Plan of Care Expires:       Subjective     Chief Complaint:   Patient/Family Comments/goals:   Pain/Comfort:         Objective:     Communicated with nurse prior to session.  Patient found up in chair with pulse ox (continuous), peripheral IV, telemetry upon PT entry to room.     General Precautions: Standard, fall  Orthopedic Precautions:    Braces:    Respiratory Status: Room air     Functional Mobility:  Bed Mobility:     Sit to Supine: moderate assistance  Transfers:     Sit to Stand:  moderate assistance with rolling walker  Bed to Chair: moderate assistance with  rolling walker  using  Step Transfer  Gait: pt amb x35ft with RW and modA with assistance for RW manipulation secondary to R veering and cues for increased step length.       AM-PAC 6 CLICK MOBILITY          Treatment & Education:  Returned at 1520 to return pt to bed.     Patient left HOB elevated with all lines intact and call button in reach..    GOALS:    Multidisciplinary Problems       Physical Therapy Goals          Problem: Physical Therapy    Goal Priority Disciplines Outcome Goal Variances Interventions   Physical Therapy Goal     PT, PT/OT      Description: Pt to be seen for therapy 5 times a week and will hopefully meet the goals below by 01-24-23  Goals  1. Pt will be sba with all bed mobility  2. Pt will be sba/cga with al;l transfers   3. Pt will be sba/cga with gait with a rw demonstrating a more normalized gait pattern                       Time Tracking:     PT Received On: 01/19/23  PT Start Time: 1127     PT Stop Time: 1146  PT Total Time (min): 25 min     Billable Minutes: Gait Training 10 and Therapeutic Activity 15    Treatment Type: Treatment  PT/PTA: PTA     PTA Visit Number: 1 01/19/2023

## 2023-01-19 NOTE — PT/OT/SLP EVAL
"Speech Language Pathology Department  Cognitive-Communication Evaluation    Patient Name:  Rojas Foster   MRN:  [id number]  Admitting Diagnosis: extremity weakness, passed Telles swallow screen    Recommendations:     General recommendations:  cognitive-linguistic therapy  Communication strategies:  go to room if call light pushed    Discharge recommendations:  Discharge Facility/Level of Care Needs: rehabilitation facility   Barriers to safe discharge: past medical history and severity of impairment    History:     Past Medical History:   Diagnosis Date    Arthritis     HTN (hypertension)      Past Surgical History:   Procedure Laterality Date    BACK SURGERY      SHOULDER SURGERY      SHOULDER SURGERY Left        Previous level of Function  Education: 11th  Occupation: retired, 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    Patient goals: "Can you let me out of here?"     Spiritual/Cultural/Pentecostalism Beliefs/Practices that affect care: no  Pain/Comfort: Pain Rating 1: 0/10  Respiratory Status: room air    Objective:     SPEECH PRODUCTION  Phoneme Production: WFL  Voice Quality: WFL  Voice Production: WFL  Speech Rate: WFL  Loudness: WFL  Respiration: WFL  Resonance: WFL  Prosody: WFL  Speech Intelligibility  Known Context: Greater that 90%  Unknown Context: Greater that 90%    AUDITORY COMPREHENSION  Following Directions:  1-Step: 100  2-Step: 33  Yes/No Questions:  Biographical: 100  Environmental: 100  Simple: 100  Complex: 100    VERBAL EXPRESSION  Automatic Speech:  Days of the week: required cue of   Countin-10    Confrontation Naming  Objects: 100  Wh- Questions:  Object name: 100  Object function: 100    COGNITION  Orientation:  Oriented to person and place  Attention:  Focused: WFL  Sustained: impaired  Pragmatics:  Eye contact: WFL  Facial expression: " WFL  Memory:  Immediate: impaired  Short Term: impaired  Long Term: impaired  Problem Solving  Functional simple: impaired  Organization:  Convergent thinking: WFL  Divergent thinking: impaired  Executive Function:  Attention to detail: impaired  Cognitive endurance: impaired  Information processing: impaired    Assessment:     Pt presents with speech and language abilities WFL however cognitive impairments are noted.  Per family, pt not far from baseline.  Skilled SLP services warranted to tx impairments.    Goals:   Multidisciplinary Problems       SLP Goals          Problem: SLP    Goal Priority Disciplines Outcome   SLP Goal     SLP Ongoing, Progressing   Description: LTG:  Pt will improve cognitive abilities to return to PLOF.    STGs:  1.  Orientation, 100% accuracy, min assist  2.  2 step directions, 100% accuracy, min assist  3.  STM, 100% accuracy, min assist                     Patient Education:     Patient, spouse, and family provided with verbal education regarding POC.  Understanding was verbalized.    Plan:     SLP Follow-Up:  Yes   Patient to be seen:  5 x/week   Plan of Care expires:  02/16/23  Plan of Care reviewed with:         Time Tracking:     SLP Treatment Date:   01/19/23  Speech Start Time:  1200  Speech Stop Time:  1220     Speech Total Time (min):  20 min    Billable minutes:  Evaluation of Speech Sound Production with Comprehension and Expression, 20 min      01/19/2023

## 2023-01-19 NOTE — PT/OT/SLP EVAL
Occupational Therapy   Evaluation    Name: Rojas Foster  MRN: [id number]  Admitting Diagnosis: <principal problem not specified>  Recent Surgery: * No surgery found *      Recommendations:     Discharge Recommendations: rehabilitation facility  Discharge Equipment Recommendations:     Barriers to discharge:       Assessment:     Rojas Foster is a 77 y.o. male with a medical diagnosis of L side weakness and confusion and a hx of chronic back pain, alz dementia. He presents with the following performance deficits affecting function: weakness, impaired endurance, impaired self care skills, impaired functional mobility, gait instability, impaired balance, impaired cognition, visual deficits.    Pt lives at home with wife who assist pt with ADLs and guidance with rollator, over the last week. Prior to a week ago, pt was indep with ADLs and functional mobility using a rollator. Today, pt t/f to chair with increased time and guidance to steer RW.    Rehab Prognosis: Good; patient would benefit from acute skilled OT services to address these deficits and reach maximum level of function.       Plan:     Patient to be seen 5 x/week to address the above listed problems via self-care/home management, therapeutic activities, therapeutic exercises  Plan of Care Expires:    Plan of Care Reviewed with: patient, spouse    Subjective     Chief Complaint: -  Patient/Family Comments/goals: -    Occupational Profile:  Living Environment: lives with wife   Previous level of function: needs assist with ADLs over the last week   Roles and Routines: -  Equipment Used at Home: walker, rolling, rollator  Assistance upon Discharge: wife     Pain/Comfort:  Pain Rating 1: 10/10  Location 1: other (see comments) (buttocks)    Patients cultural, spiritual, [employer] conflicts given the current situation:      Objective:     Communicated with: nrsg prior to session.  Patient found HOB elevated with   upon OT entry to room.    General  Precautions: Standard,    Orthopedic Precautions:    Braces:    Respiratory Status: Room air    Occupational Performance:    Bed Mobility:    Patient completed Supine to Sit with contact guard assistance    Functional Mobility/Transfers:  Patient completed Bed <> Chair Transfer using Step Transfer technique with minimum assistance with rolling walker  Functional Mobility: verbal and tactile cues to steer RW to chair ; increased time required     Activities of Daily Living:  Lower Body Dressing: maximal assistance    Toileting: maximal assistance .    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 in all places     Physical Exam:  Sensation:    -       Intact  Upper Extremity Strength:    -       Right Upper Extremity: -3/5 shoulder; 4/5 elbow/digits   -       Left Upper Extremity: -3/5 shoulder; 4/5 elbow/digits   Fine Motor Coordination:    -       Intact    AMPAC 6 Click ADL:  AMPAC Total Score:      Treatment & Education:  -    Patient left up in chair with all lines intact, call button in reach, and wife present who states she will be in room all day with pt; instructed wife to call nrsg if she were to leave    GOALS:   Multidisciplinary Problems       Occupational Therapy Goals          Problem: Occupational Therapy    Goal Priority Disciplines Outcome Interventions   Occupational Therapy Goal     OT, PT/OT Ongoing, Progressing    Description: Goals to be met by:  2/2/2023    Patient will increase functional independence with ADLs by performing:    UE Dressing with Contact Guard Assistance.  LE Dressing with Minimal Assistance.  Grooming while EOB with Modified [place].  Toileting from toilet with Supervision for hygiene and clothing management.   Toilet transfer to bedside commode with Minimal Assistance.                          History:     Past Medical History:   Diagnosis Date    Arthritis     HTN (hypertension)          Past Surgical History:   Procedure Laterality Date    BACK SURGERY      SHOULDER SURGERY      SHOULDER SURGERY Left        Time Tracking:     OT Date of Treatment:    OT Start Time: 0920  OT Stop Time: 0945  OT Total Time (min): 25 min    Billable Minutes:Evaluation Moderate Complexity     1/19/2023

## 2023-01-19 NOTE — NURSING
Nurses Note -- 4 Eyes      1/19/2023   8:43 AM      Skin assessed during: Admit      [x] No Pressure Injuries Present    []Prevention Measures Documented      [] Yes- Altered Skin Integrity Present or Discovered   [] LDA Added if Not in Epic (Describe Wound)   [] New Altered Skin Integrity was Present on Admit and Documented in LDA   [] Wound Image Taken    Wound Care Consulted? No    Attending Nurse:  Luz Marina Hayes RN     Second RN/Staff Member:  Yfn Henning RN

## 2023-01-19 NOTE — PLAN OF CARE
Problem: Occupational Therapy  Goal: Occupational Therapy Goal  Description: Goals to be met by:  2/2/2023    Patient will increase functional independence with ADLs by performing:    UE Dressing with Contact Guard Assistance.  LE Dressing with Minimal Assistance.  Grooming while EOB with Modified [place].  Toileting from toilet with Supervision for hygiene and clothing management.   Toilet transfer to bedside commode with Minimal Assistance.    Outcome: Ongoing, Progressing

## 2023-01-19 NOTE — PLAN OF CARE
Problem: SLP  Goal: SLP Goal  Description: LTG:  Pt will improve cognitive abilities to return to PLOF.    STGs:  1.  Orientation, 100% accuracy, min assist  2.  2 step directions, 100% accuracy, min assist  3.  STM, 100% accuracy, min assist  Outcome: Ongoing, Progressing     POC initiated and goals created.  Marielos

## 2023-01-20 VITALS
WEIGHT: 200 LBS | HEART RATE: 62 BPM | BODY MASS INDEX: 30.31 KG/M2 | SYSTOLIC BLOOD PRESSURE: 108 MMHG | TEMPERATURE: 97 F | OXYGEN SATURATION: 95 % | RESPIRATION RATE: 18 BRPM | HEIGHT: 68 IN | DIASTOLIC BLOOD PRESSURE: 70 MMHG

## 2023-01-20 PROBLEM — E86.0 DEHYDRATION: Status: ACTIVE | Noted: 2023-01-20

## 2023-01-20 PROBLEM — E86.0 DEHYDRATION: Status: RESOLVED | Noted: 2023-01-20 | Resolved: 2023-01-20

## 2023-01-20 LAB
BASOPHILS # BLD AUTO: 0.03 X10(3)/MCL (ref 0–0.2)
BASOPHILS NFR BLD AUTO: 0.5 %
EOSINOPHIL # BLD AUTO: 0.75 X10(3)/MCL (ref 0–0.9)
EOSINOPHIL NFR BLD AUTO: 11.8 %
ERYTHROCYTE [DISTWIDTH] IN BLOOD BY AUTOMATED COUNT: 15.2 % (ref 11.5–17)
HCT VFR BLD AUTO: 36.5 % (ref 42–52)
HGB BLD-MCNC: 11.7 GM/DL (ref 14–18)
IMM GRANULOCYTES # BLD AUTO: 0.03 X10(3)/MCL (ref 0–0.04)
IMM GRANULOCYTES NFR BLD AUTO: 0.5 %
LYMPHOCYTES # BLD AUTO: 2.32 X10(3)/MCL (ref 0.6–4.6)
LYMPHOCYTES NFR BLD AUTO: 36.6 %
MCH RBC QN AUTO: 29.6 PG
MCHC RBC AUTO-ENTMCNC: 32.1 MG/DL (ref 33–36)
MCV RBC AUTO: 92.4 FL (ref 80–94)
MONOCYTES # BLD AUTO: 0.67 X10(3)/MCL (ref 0.1–1.3)
MONOCYTES NFR BLD AUTO: 10.6 %
NEUTROPHILS # BLD AUTO: 2.54 X10(3)/MCL (ref 2.1–9.2)
NEUTROPHILS NFR BLD AUTO: 40 %
NRBC BLD AUTO-RTO: 0 %
PLATELET # BLD AUTO: 201 X10(3)/MCL (ref 130–400)
PMV BLD AUTO: 11.8 FL (ref 7.4–10.4)
RBC # BLD AUTO: 3.95 X10(6)/MCL (ref 4.7–6.1)
WBC # SPEC AUTO: 6.3 X10(3)/MCL (ref 4.5–11.5)

## 2023-01-20 PROCEDURE — 97116 GAIT TRAINING THERAPY: CPT | Mod: CQ

## 2023-01-20 PROCEDURE — 25000003 PHARM REV CODE 250: Performed by: INTERNAL MEDICINE

## 2023-01-20 PROCEDURE — 63600175 PHARM REV CODE 636 W HCPCS: Performed by: INTERNAL MEDICINE

## 2023-01-20 PROCEDURE — S4991 NICOTINE PATCH NONLEGEND: HCPCS | Performed by: INTERNAL MEDICINE

## 2023-01-20 PROCEDURE — 25000003 PHARM REV CODE 250: Performed by: NURSE PRACTITIONER

## 2023-01-20 PROCEDURE — 85025 COMPLETE CBC W/AUTO DIFF WBC: CPT | Performed by: INTERNAL MEDICINE

## 2023-01-20 PROCEDURE — 97530 THERAPEUTIC ACTIVITIES: CPT | Mod: CQ

## 2023-01-20 PROCEDURE — 36415 COLL VENOUS BLD VENIPUNCTURE: CPT | Performed by: INTERNAL MEDICINE

## 2023-01-20 PROCEDURE — 97535 SELF CARE MNGMENT TRAINING: CPT

## 2023-01-20 RX ORDER — IBUPROFEN 200 MG
1 TABLET ORAL DAILY
Qty: 30 PATCH | Refills: 0 | OUTPATIENT
Start: 2023-01-21

## 2023-01-20 RX ADMIN — PREGABALIN 75 MG: 75 CAPSULE ORAL at 03:01

## 2023-01-20 RX ADMIN — MEMANTINE HYDROCHLORIDE 10 MG: 5 TABLET ORAL at 09:01

## 2023-01-20 RX ADMIN — SERTRALINE 50 MG: 50 TABLET, FILM COATED ORAL at 09:01

## 2023-01-20 RX ADMIN — LISINOPRIL 5 MG: 5 TABLET ORAL at 09:01

## 2023-01-20 RX ADMIN — NICOTINE 1 PATCH: 21 PATCH, EXTENDED RELEASE TRANSDERMAL at 09:01

## 2023-01-20 RX ADMIN — ATORVASTATIN CALCIUM 20 MG: 10 TABLET, FILM COATED ORAL at 09:01

## 2023-01-20 RX ADMIN — HYDROCODONE BITARTRATE AND ACETAMINOPHEN 1 TABLET: 10; 325 TABLET ORAL at 03:01

## 2023-01-20 RX ADMIN — POTASSIUM CHLORIDE AND SODIUM CHLORIDE: 450; 150 INJECTION, SOLUTION INTRAVENOUS at 09:01

## 2023-01-20 RX ADMIN — FERROUS SULFATE TAB 325 MG (65 MG ELEMENTAL FE) 1 EACH: 325 (65 FE) TAB at 09:01

## 2023-01-20 RX ADMIN — PREGABALIN 75 MG: 75 CAPSULE ORAL at 09:01

## 2023-01-20 RX ADMIN — HYDROCODONE BITARTRATE AND ACETAMINOPHEN 1 TABLET: 10; 325 TABLET ORAL at 09:01

## 2023-01-20 NOTE — PLAN OF CARE
01/20/23 0844   Discharge Reassessment   Assessment Type Discharge Planning Reassessment   Discharge Plan discussed with: Patient;Spouse/sig other   Post-Acute Status   Post-Acute Authorization Placement   Post-Acute Placement Status Referrals Sent  ([facility])

## 2023-01-20 NOTE — PLAN OF CARE
01/20/23 1532   Final Note   Assessment Type Final Discharge Note   Anticipated Discharge Disposition Rehab   Post-Acute Status   Post-Acute Authorization Placement   Post-Acute Placement Status Set-up Complete/Auth obtained  ([facility])

## 2023-01-20 NOTE — PT/OT/SLP PROGRESS
Occupational Therapy   Treatment    Name: Rojas Foster  MRN: [id number]  Admitting Diagnosis:  <principal problem not specified>       Recommendations:     Discharge Recommendations: rehabilitation facility  Discharge Equipment Recommendations:     Barriers to discharge:       Assessment:     Rojas Foster is a 77 y.o. male with a medical diagnosis of <principal problem not specified>.  He presents with . Performance deficits affecting function are weakness, impaired endurance, impaired self care skills, impaired functional mobility, gait instability, impaired balance, impaired cognition, decreased coordination, visual deficits.     Rehab Prognosis:  Good; patient would benefit from acute skilled OT services to address these deficits and reach maximum level of function.       Plan:     Patient to be seen 5 x/week to address the above listed problems via self-care/home management, therapeutic activities, therapeutic exercises  Plan of Care Expires:    Plan of Care Reviewed with: patient, spouse    Subjective     Pain/Comfort:  Pain Rating 1: 0/10    Objective:     Communicated with: nrsg prior to session.  Patient found up in chair with   upon OT entry to room.    General Precautions: Standard,      Orthopedic Precautions:   Braces:    Respiratory Status: Room air     Occupational Performance:       Functional Mobility/Transfers:  Patient completed Sit <> Stand Transfer with maximal assistance  with  rolling walker   Functional Mobility: x2 sit <> stands; cues for safe technique and hand placement on chair/RW; pt unable to stand >5 seconds each trial     Activities of Daily Living:  Grooming: stand by assistance VC and increased time for initiation and follow through of task to perform oral care in chair 2/2 inability to ambulate to sink   Lower Body Dressing: minimum assistance via figure 4 with leg rest raised in chair to don;/doff BLE socks ; pt required slight assist for LLE sock to thread 2/2 decreased  coordination       [facility] 6 Click ADL:      Treatment & Education:  -    Patient left up in chair with all lines intact, call button in reach, and wife present    GOALS:   Multidisciplinary Problems       Occupational Therapy Goals          Problem: Occupational Therapy    Goal Priority Disciplines Outcome Interventions   Occupational Therapy Goal     OT, PT/OT Ongoing, Progressing    Description: Goals to be met by:  2/2/2023    Patient will increase functional independence with ADLs by performing:    UE Dressing with Contact Guard Assistance.  LE Dressing with Minimal Assistance.  Grooming while EOB with Modified [place].  Toileting from toilet with Supervision for hygiene and clothing management.   Toilet transfer to bedside commode with Minimal Assistance.                         Time Tracking:     OT Date of Treatment:    OT Start Time: 1121  OT Stop Time: 1145  OT Total Time (min): 24 min    Billable Minutes:Self Care/Home Management 24min    OT/DERRICK: OT     DERRICK Visit Number: 2    1/20/2023

## 2023-01-20 NOTE — PT/OT/SLP PROGRESS
Physical Therapy Treatment    Patient Name:  Rojas Foster   MRN:  [id number]    Recommendations:     Discharge Recommendations: rehabilitation facility  Discharge Equipment Recommendations: other (see comments) (to be determined at later date)  Barriers to discharge: None    Assessment:     Rojas Foster is a 77 y.o. male admitted with a medical diagnosis of <principal problem not specified>.  He presents with the following impairments/functional limitations: weakness, gait instability, impaired endurance, impaired balance, decreased safety awareness, impaired cognition, impaired functional mobility .    Rehab Prognosis: Good; patient would benefit from acute skilled PT services to address these deficits and reach maximum level of function.    Recent Surgery: * No surgery found *      Plan:     During this hospitalization, patient to be seen 5 x/week to address the identified rehab impairments via gait training, therapeutic activities and progress toward the following goals:    Plan of Care Expires:       Subjective     Chief Complaint:   Patient/Family Comments/goals:   Pain/Comfort:         Objective:     Communicated with nurse prior to session.  Patient found HOB elevated with pulse ox (continuous), peripheral IV, telemetry upon PT entry to room.     General Precautions: Standard, fall  Orthopedic Precautions:    Braces:    Respiratory Status: Room air     Functional Mobility:  Bed Mobility:     Rolling Left:  minimum assistance  Rolling Right: minimum assistance  Supine to Sit: moderate assistance  Transfers:     Sit to Stand:  moderate assistance with rolling walker  Gait: pt amb x20ft with RW and modA from EOB to commode with cues for step length and assistance for RW manipulation. Pt amb in hallway x57ft with RW and Liliane with visual and verbal cues for step length as well as RW manipulation.      AM-PAC 6 CLICK MOBILITY          Treatment & Education:  Pt required increased cuing and assistance for  ambulation this AM as compared to previous treatment session.     Patient left up in chair with all lines intact and call button in reach..    GOALS:   Multidisciplinary Problems       Physical Therapy Goals          Problem: Physical Therapy    Goal Priority Disciplines Outcome Goal Variances Interventions   Physical Therapy Goal     PT, PT/OT      Description: Pt to be seen for therapy 5 times a week and will hopefully meet the goals below by 01-24-23  Goals  1. Pt will be sba with all bed mobility  2. Pt will be sba/cga with al;l transfers   3. Pt will be sba/cga with gait with a rw demonstrating a more normalized gait pattern                       Time Tracking:     PT Received On: 01/20/23  PT Start Time: 1054     PT Stop Time: 1122  PT Total Time (min): 28 min     Billable Minutes: Gait Training 15 and Therapeutic Activity 13    Treatment Type: Treatment  PT/PTA: PTA     PTA Visit Number: 2     01/20/2023

## 2023-01-20 NOTE — PT/OT/SLP PROGRESS
SLP attempting to see pt for tx however staff working at bedside.  Nursing reports pt with possible d/c to rehab facility today.  SLP to continue to follow as appropriate.

## 2023-01-20 NOTE — PROGRESS NOTES
[facility]                       [address] [id number]    PATIENT NAME:       MARISSA REYNA  YOB: 1945  CSN:                [id number]   MRN:                [id number]  ADMIT DATE:         01/17/2023 10:11:00  PHYSICIAN:          Saurabh Dyer MD                            PROGRESS NOTE    DATE:      SUBJECTIVE:  This is a 77-year-old white male.  He looks better today.  He is   still very weak, but right now he is not as confused.  At the present time, he   seems to be a good candidate for rehab.  Speech Therapy has been working and it   seems that the cognition has improved.  No fever, chills, or any other problems   at present time.    REVIEW OF SYSTEMS:  Times 12 as above.    OBJECTIVE:  VITAL SIGNS:  Blood pressure is 105/67, pulse 62, and temp 98.1.  GENERAL APPEARANCE:  Alert, in no acute distress.  HEART:  Regular rhythm and rate.  LUNGS: Clear.  ABDOMEN:  Soft, nontender.  EXTREMITIES:  .  No clubbing, cyanosis, or edema.    NEUROLOGIC:  Unchanged.    LABS:  There are no new labs.    IMPRESSION:    1. Mental status changes with increased confusion and decreased strength, mostly   on the left side, secondary to leukoencephalomalacia and central lumbar spinal   stenosis.    2. Slight anemia.  3. Hypertension.  4. Dyslipidemia.  5. History of depression.  6. Anxiety.  7. Obesity.    8. He has an elevated BUN and creatinine, possibly secondary to dehydration   versus chronic kidney disease.    PLAN:    1. Will continue with PT/OT/ST.    2. Will continue to mobilize as tolerated.    3. Will continue home meds.    4. Will recheck labs tomorrow.    5. We are waiting for placement, rehab versus SNF.        ______________________________  Saurabh Dyer MD    NEHEMIAS/AQS  DD:  01/19/2023  Time:  06:27PM  DT:  01/19/2023  Time:  06:37PM  Job #:  [id number]      PROGRESS NOTE

## 2023-02-14 ENCOUNTER — TELEPHONE (OUTPATIENT)
Dept: NEUROLOGY | Facility: CLINIC | Age: 78
End: 2023-02-14
Payer: MEDICARE

## 2023-02-14 NOTE — TELEPHONE ENCOUNTER
No radhames.   If you are able to locate finals in chart, Dr Avery can view them as well.  He can address their questions at appt     Apologies, I misunderstood. I understood that they wanted new imaging ordered.

## 2023-02-14 NOTE — TELEPHONE ENCOUNTER
Reports pt went to [facility] in August of 2022 and was advise he had a TIA.   States pt had imaging done 8/22 through 8/24 and recent imaging done 1/17/23.  Is asking if Dr. Espinoza can review imaging and advise pt about it during NOV.    LOV: 1/20/22    NOV: 3/15/23

## 2023-02-14 NOTE — TELEPHONE ENCOUNTER
[place] states pt had MRI, CT and Xrays done 8/22/23 through 8/24/23 and recent CT and Xrays done 1/17/23.  I see finals in chart.  Is more information needed prior to appt?

## 2023-02-20 NOTE — DISCHARGE SUMMARY
[facility]                       [address] [address]    PATIENT NAME:       MARISSA REYNA  YOB: 1945  CSN:                [id number]   MRN:                [id number]  ADMIT DATE:         01/17/2023 10:11:00  PHYSICIAN:          Saurabh Dyer MD                          DISCHARGE SUMMARY    DATE OF DISCHARGE:  01/20/2023 16:43:00    A 77-year-old white male.  He was in his usual state of care when he started to   present with increased confusion and deconditioning and unable to move.  At that   point, he was taken to the hospital.  He was found to have a worsening   leukoencephalomalacia and significant weakness and unsteadiness.  He was   admitted for further evaluation and treatment.  He was not found to have any   significant infections or other issues.  He did start PT/OT/ST for ADLs,   strengthening, and cognitive issues.  He was consulted to  for rehab   placement to help with his strengthening as well as his continued function,   etc.  He was transferred to [facility] on the 20th.    FINAL DIAGNOSES:    1. Mental status changes with increased confusion, decreased strength in the   left side secondary to leukoencephalomalacia and central lumbar stenosis.   2. Slight anemia.   3. Hypertension.   4. Dyslipidemia.   5. History of depression and anxiety.   6. Obesity.   7. Dementia.   8. He has chronic back pain.    9. Peripheral neuropathy.  10. Significant anxiety and depression.   11. Osteopenia.   12. Extensive leukoencephalopathy and microvascular ischemia.   13. Ventriculomegaly.   14. Chronic back pain with spinal stenosis.    The patient will be taken care at [facility] for strengthening.    Please refer to discharge paper for complete list of the medications and diet.    The patient will follow up with his primary care after his discharge from  rehab.        ______________________________  MD NEHEMIAS lA/ANDRÉS  DD:  02/20/2023  Time:  11:54AM  DT:  02/20/2023  Time:  12:07PM  Job #:  [id number]    cc:   Vishal Correa MD        DISCHARGE SUMMARY

## 2023-02-22 ENCOUNTER — LAB REQUISITION (OUTPATIENT)
Dept: LAB | Facility: HOSPITAL | Age: 78
End: 2023-02-22
Payer: MEDICARE

## 2023-02-22 DIAGNOSIS — N20.0 CALCULUS OF KIDNEY: ICD-10-CM

## 2023-02-22 DIAGNOSIS — E78.2 MIXED HYPERLIPIDEMIA: ICD-10-CM

## 2023-02-22 DIAGNOSIS — R42 DIZZINESS AND GIDDINESS: ICD-10-CM

## 2023-02-22 DIAGNOSIS — I10 ESSENTIAL (PRIMARY) HYPERTENSION: ICD-10-CM

## 2023-02-22 DIAGNOSIS — Z00.01 ENCOUNTER FOR GENERAL ADULT MEDICAL EXAMINATION WITH ABNORMAL FINDINGS: ICD-10-CM

## 2023-02-22 LAB
ALBUMIN SERPL-MCNC: 3.4 G/DL (ref 3.4–4.8)
ALBUMIN/GLOB SERPL: 1.3 RATIO (ref 1.1–2)
ALP SERPL-CCNC: 70 UNIT/L (ref 40–150)
ALT SERPL-CCNC: 20 UNIT/L (ref 0–55)
AST SERPL-CCNC: 17 UNIT/L (ref 5–34)
BASOPHILS # BLD AUTO: 0.02 X10(3)/MCL (ref 0–0.2)
BASOPHILS NFR BLD AUTO: 0.4 %
BILIRUBIN DIRECT+TOT PNL SERPL-MCNC: 0.5 MG/DL
BUN SERPL-MCNC: 26.2 MG/DL (ref 8.4–25.7)
CALCIUM SERPL-MCNC: 8.9 MG/DL (ref 8.8–10)
CHLORIDE SERPL-SCNC: 101 MMOL/L (ref 98–107)
CHOLEST SERPL-MCNC: 161 MG/DL
CHOLEST/HDLC SERPL: 4 {RATIO} (ref 0–5)
CK SERPL-CCNC: 185 U/L (ref 30–200)
CO2 SERPL-SCNC: 28 MMOL/L (ref 23–31)
CREAT SERPL-MCNC: 1.06 MG/DL (ref 0.73–1.18)
EOSINOPHIL # BLD AUTO: 0.31 X10(3)/MCL (ref 0–0.9)
EOSINOPHIL NFR BLD AUTO: 6.1 %
ERYTHROCYTE [DISTWIDTH] IN BLOOD BY AUTOMATED COUNT: 15.4 % (ref 11.5–17)
GFR SERPLBLD CREATININE-BSD FMLA CKD-EPI: >60 MLS/MIN/1.73/M2
GLOBULIN SER-MCNC: 2.7 GM/DL (ref 2.4–3.5)
GLUCOSE SERPL-MCNC: 90 MG/DL (ref 82–115)
HCT VFR BLD AUTO: 40.1 % (ref 42–52)
HDLC SERPL-MCNC: 45 MG/DL (ref 35–60)
HGB BLD-MCNC: 12.2 G/DL (ref 14–18)
IMM GRANULOCYTES # BLD AUTO: 0.01 X10(3)/MCL (ref 0–0.04)
IMM GRANULOCYTES NFR BLD AUTO: 0.2 %
LDLC SERPL CALC-MCNC: 95 MG/DL (ref 50–140)
LYMPHOCYTES # BLD AUTO: 1.8 X10(3)/MCL (ref 0.6–4.6)
LYMPHOCYTES NFR BLD AUTO: 35.5 %
MCH RBC QN AUTO: 29.1 PG
MCHC RBC AUTO-ENTMCNC: 30.4 G/DL (ref 33–36)
MCV RBC AUTO: 95.7 FL (ref 80–94)
MONOCYTES # BLD AUTO: 0.45 X10(3)/MCL (ref 0.1–1.3)
MONOCYTES NFR BLD AUTO: 8.9 %
NEUTROPHILS # BLD AUTO: 2.48 X10(3)/MCL (ref 2.1–9.2)
NEUTROPHILS NFR BLD AUTO: 48.9 %
PLATELET # BLD AUTO: 209 X10(3)/MCL (ref 130–400)
PMV BLD AUTO: 11.1 FL (ref 7.4–10.4)
POTASSIUM SERPL-SCNC: 4.1 MMOL/L (ref 3.5–5.1)
PROT SERPL-MCNC: 6.1 GM/DL (ref 5.8–7.6)
RBC # BLD AUTO: 4.19 X10(6)/MCL (ref 4.7–6.1)
SODIUM SERPL-SCNC: 138 MMOL/L (ref 136–145)
TRIGL SERPL-MCNC: 105 MG/DL (ref 34–140)
TSH SERPL-ACNC: 1.26 UIU/ML (ref 0.35–4.94)
VLDLC SERPL CALC-MCNC: 21 MG/DL
WBC # SPEC AUTO: 5.1 X10(3)/MCL (ref 4.5–11.5)

## 2023-02-22 PROCEDURE — 80061 LIPID PANEL: CPT | Performed by: INTERNAL MEDICINE

## 2023-02-22 PROCEDURE — 85025 COMPLETE CBC W/AUTO DIFF WBC: CPT | Performed by: INTERNAL MEDICINE

## 2023-02-22 PROCEDURE — 80053 COMPREHEN METABOLIC PANEL: CPT | Performed by: INTERNAL MEDICINE

## 2023-02-22 PROCEDURE — 82550 ASSAY OF CK (CPK): CPT | Performed by: INTERNAL MEDICINE

## 2023-02-22 PROCEDURE — 84443 ASSAY THYROID STIM HORMONE: CPT | Performed by: INTERNAL MEDICINE

## 2023-03-14 ENCOUNTER — EXTERNAL HOME HEALTH (OUTPATIENT)
Dept: HOME HEALTH SERVICES | Facility: HOSPITAL | Age: 78
End: 2023-03-14
Payer: MEDICARE

## 2023-03-15 ENCOUNTER — OFFICE VISIT (OUTPATIENT)
Dept: NEUROLOGY | Facility: CLINIC | Age: 78
End: 2023-03-15
Payer: MEDICARE

## 2023-03-15 VITALS
DIASTOLIC BLOOD PRESSURE: 68 MMHG | BODY MASS INDEX: 31.52 KG/M2 | HEIGHT: 68 IN | SYSTOLIC BLOOD PRESSURE: 102 MMHG | WEIGHT: 208 LBS

## 2023-03-15 DIAGNOSIS — R26.9 GAIT DISORDER: ICD-10-CM

## 2023-03-15 DIAGNOSIS — F41.9 ANXIETY: ICD-10-CM

## 2023-03-15 DIAGNOSIS — G47.33 OBSTRUCTIVE SLEEP APNEA: ICD-10-CM

## 2023-03-15 DIAGNOSIS — G30.9 ALZHEIMER'S DISEASE: Primary | ICD-10-CM

## 2023-03-15 DIAGNOSIS — F02.80 ALZHEIMER'S DISEASE: Primary | ICD-10-CM

## 2023-03-15 DIAGNOSIS — R41.89 COGNITIVE IMPAIRMENT: ICD-10-CM

## 2023-03-15 PROCEDURE — 99999 PR PBB SHADOW E&M-EST. PATIENT-LVL IV: CPT | Mod: PBBFAC,,, | Performed by: PSYCHIATRY & NEUROLOGY

## 2023-03-15 PROCEDURE — 99999 PR PBB SHADOW E&M-EST. PATIENT-LVL IV: ICD-10-PCS | Mod: PBBFAC,,, | Performed by: PSYCHIATRY & NEUROLOGY

## 2023-03-15 PROCEDURE — 99214 OFFICE O/P EST MOD 30 MIN: CPT | Mod: PBBFAC | Performed by: PSYCHIATRY & NEUROLOGY

## 2023-03-15 PROCEDURE — 99214 PR OFFICE/OUTPT VISIT, EST, LEVL IV, 30-39 MIN: ICD-10-PCS | Mod: S$PBB,,, | Performed by: PSYCHIATRY & NEUROLOGY

## 2023-03-15 PROCEDURE — 99214 OFFICE O/P EST MOD 30 MIN: CPT | Mod: S$PBB,,, | Performed by: PSYCHIATRY & NEUROLOGY

## 2023-03-15 RX ORDER — MULTIVIT WITH MINERALS/HERBS
1 TABLET ORAL DAILY
COMMUNITY

## 2023-03-15 RX ORDER — SERTRALINE HYDROCHLORIDE 50 MG/1
75 TABLET, FILM COATED ORAL DAILY
Qty: 135 TABLET | Refills: 4 | Status: SHIPPED | OUTPATIENT
Start: 2023-03-15 | End: 2023-09-13

## 2023-03-15 RX ORDER — POLYETHYLENE GLYCOL 3350 17 G/17G
17 POWDER, FOR SOLUTION ORAL DAILY
COMMUNITY

## 2023-03-15 RX ORDER — MEMANTINE HYDROCHLORIDE 10 MG/1
10 TABLET ORAL 2 TIMES DAILY
Qty: 180 TABLET | Refills: 4 | Status: SHIPPED | OUTPATIENT
Start: 2023-03-15 | End: 2024-03-20 | Stop reason: SDUPTHER

## 2023-03-15 RX ORDER — QUINAPRIL 5 1/1
1 TABLET ORAL DAILY
COMMUNITY
Start: 2023-02-20

## 2023-03-15 RX ORDER — LANOLIN ALCOHOL/MO/W.PET/CERES
400 CREAM (GRAM) TOPICAL DAILY
COMMUNITY

## 2023-03-15 RX ORDER — ASPIRIN 81 MG/1
81 TABLET ORAL DAILY
COMMUNITY

## 2023-03-15 RX ORDER — GABAPENTIN 400 MG/1
1 CAPSULE ORAL DAILY
COMMUNITY
Start: 2023-03-02 | End: 2023-12-14

## 2023-03-15 RX ORDER — MELOXICAM 15 MG/1
1 TABLET ORAL DAILY
COMMUNITY
Start: 2023-02-16

## 2023-03-15 NOTE — PROGRESS NOTES
"Subjective:       Patient ID: Rojas Foster is a 78 y.o. male.    Chief Complaint: Alzheimer Disease (C/o memory decline.  Eating/sleeping well - wife reports "eats constantly") and Sleep Apnea (Reports using CPAP regularly)    HPI  Anxiety is worse; on zoloft 50 mg/day    Memory is worse; on namneda    Went to rehab and completed PT for walking issues    Wife present and son on phone for duration of visit    Samson; compliant with cpap    Mri brain images reviewd; extensive wm changes and atrphy  Review of Systems    The remainder of the 14 system ROS is noncontributory or negative unless mentioned/reviewed above.    Objective:      Physical Exam  Mental Status: Alert and oriented x3. Language is fluent with good comprehension.    Cranial Nerve: Pupils are equal, round, and reactive to light. Visual fields are intact to confrontation. Normal fundi. Ocular movements are intact. Face is symmetric at rest and with activation with intact sensation throughout. Hearing intact to finger rub bilaterally. Muscles of tongue and palate activate symmetrically. No dysarthria. Strength is full in sternocleidomastoid and trapezius bilaterally.    Motor: Muscle bulk and tone are normal. Strength is 5/5 in all four extremities both proximally and distally. Intact fine motor movements bilaterally. There is no pronator drift or satelliting on arm roll.    Sensory: Sensation is intact to light touch, pinprick, vibration, and proprioception throughout. Romberg is negative.    Reflexes: 2+ and symmetric at the biceps, triceps, brachioradialis, patella, and Achilles bilaterally. Plantar response is flexor bilaterally.    Coordination: No dysmetria on finger-nose-finger or heel-knee-shin. Normal rapid alternating movements. Fast finger tapping with normal amplitude and speed.    Gait: walker      Assessment:       Problem List Items Addressed This Visit          Neuro    Alzheimer's disease - Primary       Psychiatric    Anxiety    " Relevant Medications    sertraline (ZOLOFT) 50 MG tablet     Other Visit Diagnoses       Obstructive sleep apnea        Relevant Orders    CPAP/BIPAP SUPPLIES    Cognitive impairment        Relevant Medications    memantine (NAMENDA) 10 MG Tab    Gait disorder        Relevant Orders    Ambulatory referral/consult to Physical/Occupational Therapy              Plan:       Namenda  Cpap supplies  Increase zoloft to 75 mg/day  PT reordered

## 2023-09-04 ENCOUNTER — OFFICE VISIT (OUTPATIENT)
Dept: URGENT CARE | Facility: CLINIC | Age: 78
End: 2023-09-04
Payer: MEDICARE

## 2023-09-04 VITALS
RESPIRATION RATE: 18 BRPM | HEIGHT: 68 IN | BODY MASS INDEX: 31.37 KG/M2 | DIASTOLIC BLOOD PRESSURE: 71 MMHG | SYSTOLIC BLOOD PRESSURE: 109 MMHG | HEART RATE: 57 BPM | WEIGHT: 207 LBS | OXYGEN SATURATION: 96 % | TEMPERATURE: 98 F

## 2023-09-04 DIAGNOSIS — L03.031 PARONYCHIA OF GREAT TOE, RIGHT: ICD-10-CM

## 2023-09-04 PROCEDURE — 99203 PR OFFICE/OUTPT VISIT, NEW, LEVL III, 30-44 MIN: ICD-10-PCS | Mod: ,,, | Performed by: NURSE PRACTITIONER

## 2023-09-04 PROCEDURE — 99203 OFFICE O/P NEW LOW 30 MIN: CPT | Mod: ,,, | Performed by: NURSE PRACTITIONER

## 2023-09-04 RX ORDER — MUPIROCIN 20 MG/G
OINTMENT TOPICAL 3 TIMES DAILY
Qty: 15 G | Refills: 0 | Status: SHIPPED | OUTPATIENT
Start: 2023-09-04

## 2023-09-04 RX ORDER — CEPHALEXIN 500 MG/1
500 CAPSULE ORAL EVERY 6 HOURS
Qty: 40 CAPSULE | Refills: 0 | Status: SHIPPED | OUTPATIENT
Start: 2023-09-04 | End: 2023-09-14

## 2023-09-04 NOTE — PATIENT INSTRUCTIONS
Soak foot in warm Epsom salt twice a day as instructed  Tylenol or ibuprofen OTC for pain as directed or your home pain medication as directed  Apply topical mupirocin ointment as directed  Prescription Keflex as directed  Follow-up with her primary care provider or return here in 2 days for a wound check if symptoms worsen or persist.  Go to ER For worsening redness, swelling, or for concerns as instructed

## 2023-09-04 NOTE — PROGRESS NOTES
"Subjective:      Patient ID: Rojas Foster is a 78 y.o. male.    Vitals:  height is 5' 8" (1.727 m) and weight is 93.9 kg (207 lb). His oral temperature is 97.9 °F (36.6 °C). His blood pressure is 109/71 and his pulse is 57 (abnormal). His respiration is 18 and oxygen saturation is 96%.     Chief Complaint: Nail Problem and Toe Pain (The patient has pain on the right big toe. X1 day. )    78-year-old male presents with mild tenderness to the right great toe surrounding the nail.  Onset this morning.  No recent injury or trauma.    Nail Problem    Toe Pain       Skin:  Positive for erythema. Puncture wound: right great toe.     Objective:     Physical Exam   Abdominal: Normal appearance.   Musculoskeletal: Normal range of motion.         General: Tenderness present. No swelling or signs of injury. Normal range of motion.      Right foot: Comments: Mild tenderness to palpation to the right great toe distally surrounding the nail, good range of motion, no swelling or exudate drainage.  Foot soaked in Betadine and saline solution        Feet:    Neurological: He is alert.   Skin: Skin is warm and dry. erythema   Nursing note and vitals reviewed.      Assessment:     1. Paronychia of great toe, right        Plan:   Soak foot in warm Epsom salt twice a day as instructed  Tylenol or ibuprofen OTC for pain as directed or your home pain medication as directed  Apply topical mupirocin ointment as directed  Prescription Keflex as directed  Follow-up with her primary care provider or return here in 2 days for a wound check if symptoms worsen or persist.  Go to ER For worsening redness, swelling, or for concerns as instructed      Paronychia of great toe, right  -     mupirocin (BACTROBAN) 2 % ointment; Apply topically 3 (three) times daily.  Dispense: 15 g; Refill: 0  -     cephALEXin (KEFLEX) 500 MG capsule; Take 1 capsule (500 mg total) by mouth every 6 (six) hours. for 10 days  Dispense: 40 capsule; Refill: 0  -     " Ambulatory referral/consult to Podiatry

## 2023-09-13 ENCOUNTER — OFFICE VISIT (OUTPATIENT)
Dept: NEUROLOGY | Facility: CLINIC | Age: 78
End: 2023-09-13
Payer: MEDICARE

## 2023-09-13 VITALS
BODY MASS INDEX: 31.37 KG/M2 | HEIGHT: 68 IN | DIASTOLIC BLOOD PRESSURE: 65 MMHG | HEART RATE: 59 BPM | WEIGHT: 207 LBS | SYSTOLIC BLOOD PRESSURE: 102 MMHG

## 2023-09-13 DIAGNOSIS — R26.9 GAIT DISORDER: Primary | ICD-10-CM

## 2023-09-13 DIAGNOSIS — G30.9 ALZHEIMER'S DISEASE: ICD-10-CM

## 2023-09-13 DIAGNOSIS — F02.80 ALZHEIMER'S DISEASE: ICD-10-CM

## 2023-09-13 PROCEDURE — 99214 OFFICE O/P EST MOD 30 MIN: CPT | Mod: PBBFAC | Performed by: PSYCHIATRY & NEUROLOGY

## 2023-09-13 PROCEDURE — 99214 OFFICE O/P EST MOD 30 MIN: CPT | Mod: S$PBB,,, | Performed by: PSYCHIATRY & NEUROLOGY

## 2023-09-13 PROCEDURE — 99999 PR PBB SHADOW E&M-EST. PATIENT-LVL IV: CPT | Mod: PBBFAC,,, | Performed by: PSYCHIATRY & NEUROLOGY

## 2023-09-13 PROCEDURE — 99999 PR PBB SHADOW E&M-EST. PATIENT-LVL IV: ICD-10-PCS | Mod: PBBFAC,,, | Performed by: PSYCHIATRY & NEUROLOGY

## 2023-09-13 PROCEDURE — 99214 PR OFFICE/OUTPT VISIT, EST, LEVL IV, 30-39 MIN: ICD-10-PCS | Mod: S$PBB,,, | Performed by: PSYCHIATRY & NEUROLOGY

## 2023-09-13 RX ORDER — ESCITALOPRAM OXALATE 20 MG/1
20 TABLET ORAL DAILY
Qty: 30 TABLET | Refills: 11 | Status: SHIPPED | OUTPATIENT
Start: 2023-09-13 | End: 2023-12-14

## 2023-09-13 NOTE — PROGRESS NOTES
Subjective     Patient ID: Rojas Foster is a 78 y.o. male.    Chief Complaint: Alzheimer's disease (Wife is preset. C/o anxiety medication (NAMENDA) is not working/C/o Zofran is not working either. Request different type is possible)    HPI  Memory worse  Walking better after PT; occ has episodes where he becomes confused and closes his eyes while standing up  Has days when he needs a wheelchair; typically uses a walker  No falls  No panic; anxiety worse; mild depression wants to change ssri  Review of Systems  The remainder of the 14 system ROS is noncontributory or negative unless mentioned/reviewed above.       Objective     Physical Exam  Mental Status: Alert and oriented x3. Language is fluent with good comprehension.    Cranial Nerve: Pupils are equal, round, and reactive to light. Visual fields are intact to confrontation. Normal fundi. Ocular movements are intact. Face is symmetric at rest and with activation with intact sensation throughout. Hearing intact to finger rub bilaterally. Muscles of tongue and palate activate symmetrically. No dysarthria. Strength is full in sternocleidomastoid and trapezius bilaterally.    Motor: Muscle bulk and tone are normal. Strength is 5/5 in all four extremities both proximally and distally. Intact fine motor movements bilaterally. There is no pronator drift or satelliting on arm roll.    Sensory: Sensation is intact to light touch, pinprick, vibration, and proprioception throughout. Romberg is negative.    Reflexes: 2+ and symmetric at the biceps, triceps, brachioradialis, patella, and Achilles bilaterally. Plantar response is flexor bilaterally.    Coordination: No dysmetria on finger-nose-finger or heel-knee-shin. Normal rapid alternating movements. Fast finger tapping with normal amplitude and speed.    Gait: short steps       Assessment and Plan     1. Gait disorder  -     Ambulatory referral/consult to Physical/Occupational Therapy; Future; Expected date:  09/20/2023    2. Alzheimer's disease  -     Ambulatory referral/consult to Physical/Occupational Therapy; Future; Expected date: 09/20/2023    Other orders  -     EScitalopram oxalate (LEXAPRO) 20 MG tablet; Take 1 tablet (20 mg total) by mouth once daily.  Dispense: 30 tablet; Refill: 11        Switch zoloft to lexapro over 2 weeks         Follow up in about 3 months (around 12/13/2023).

## 2023-10-16 ENCOUNTER — HOSPITAL ENCOUNTER (INPATIENT)
Facility: HOSPITAL | Age: 78
LOS: 3 days | Discharge: NURSING, REHAB OR LONG-TERM CARE | DRG: 682 | End: 2023-10-20
Attending: EMERGENCY MEDICINE | Admitting: INTERNAL MEDICINE
Payer: MEDICARE

## 2023-10-16 DIAGNOSIS — R41.82 ALTERED MENTAL STATUS, UNSPECIFIED ALTERED MENTAL STATUS TYPE: ICD-10-CM

## 2023-10-16 DIAGNOSIS — R53.1 GENERALIZED WEAKNESS: Primary | ICD-10-CM

## 2023-10-16 PROCEDURE — 99285 EMERGENCY DEPT VISIT HI MDM: CPT

## 2023-10-17 LAB
ALBUMIN SERPL-MCNC: 4.4 G/DL (ref 3.4–4.8)
ALBUMIN/GLOB SERPL: 1.3 RATIO (ref 1.1–2)
ALP SERPL-CCNC: 86 UNIT/L (ref 40–150)
ALT SERPL-CCNC: 14 UNIT/L (ref 0–55)
APPEARANCE UR: CLEAR
AST SERPL-CCNC: 22 UNIT/L (ref 5–34)
BACTERIA #/AREA URNS AUTO: NORMAL /HPF
BASOPHILS # BLD AUTO: 0.03 X10(3)/MCL
BASOPHILS NFR BLD AUTO: 0.4 %
BILIRUB SERPL-MCNC: 0.7 MG/DL
BILIRUB UR QL STRIP.AUTO: NEGATIVE
BUN SERPL-MCNC: 26.6 MG/DL (ref 8.4–25.7)
CALCIUM SERPL-MCNC: 9.5 MG/DL (ref 8.8–10)
CHLORIDE SERPL-SCNC: 105 MMOL/L (ref 98–107)
CO2 SERPL-SCNC: 24 MMOL/L (ref 23–31)
COLOR UR AUTO: YELLOW
CREAT SERPL-MCNC: 1.37 MG/DL (ref 0.73–1.18)
EOSINOPHIL # BLD AUTO: 0.6 X10(3)/MCL (ref 0–0.9)
EOSINOPHIL NFR BLD AUTO: 7.7 %
ERYTHROCYTE [DISTWIDTH] IN BLOOD BY AUTOMATED COUNT: 15.8 % (ref 11.5–17)
FLUAV AG UPPER RESP QL IA.RAPID: NOT DETECTED
FLUBV AG UPPER RESP QL IA.RAPID: NOT DETECTED
GFR SERPLBLD CREATININE-BSD FMLA CKD-EPI: 53 MLS/MIN/1.73/M2
GLOBULIN SER-MCNC: 3.4 GM/DL (ref 2.4–3.5)
GLUCOSE SERPL-MCNC: 83 MG/DL (ref 82–115)
GLUCOSE UR QL STRIP.AUTO: NEGATIVE
HCT VFR BLD AUTO: 40.6 % (ref 42–52)
HGB BLD-MCNC: 13.1 G/DL (ref 14–18)
IMM GRANULOCYTES # BLD AUTO: 0.04 X10(3)/MCL (ref 0–0.04)
IMM GRANULOCYTES NFR BLD AUTO: 0.5 %
KETONES UR QL STRIP.AUTO: NEGATIVE
LEUKOCYTE ESTERASE UR QL STRIP.AUTO: NEGATIVE
LYMPHOCYTES # BLD AUTO: 1.42 X10(3)/MCL (ref 0.6–4.6)
LYMPHOCYTES NFR BLD AUTO: 18.3 %
MCH RBC QN AUTO: 30.8 PG (ref 27–31)
MCHC RBC AUTO-ENTMCNC: 32.3 G/DL (ref 33–36)
MCV RBC AUTO: 95.3 FL (ref 80–94)
MONOCYTES # BLD AUTO: 0.43 X10(3)/MCL (ref 0.1–1.3)
MONOCYTES NFR BLD AUTO: 5.5 %
NEUTROPHILS # BLD AUTO: 5.25 X10(3)/MCL (ref 2.1–9.2)
NEUTROPHILS NFR BLD AUTO: 67.6 %
NITRITE UR QL STRIP.AUTO: NEGATIVE
NRBC BLD AUTO-RTO: 0 %
PH UR STRIP.AUTO: 7 [PH]
PLATELET # BLD AUTO: 190 X10(3)/MCL (ref 130–400)
PMV BLD AUTO: 10.3 FL (ref 7.4–10.4)
POTASSIUM SERPL-SCNC: 4.4 MMOL/L (ref 3.5–5.1)
PROT SERPL-MCNC: 7.8 GM/DL (ref 5.8–7.6)
PROT UR QL STRIP.AUTO: NEGATIVE
RBC # BLD AUTO: 4.26 X10(6)/MCL (ref 4.7–6.1)
RBC #/AREA URNS AUTO: <5 /HPF
RBC UR QL AUTO: NEGATIVE
SARS-COV-2 RNA RESP QL NAA+PROBE: NOT DETECTED
SODIUM SERPL-SCNC: 140 MMOL/L (ref 136–145)
SP GR UR STRIP.AUTO: 1.01 (ref 1–1.03)
SQUAMOUS #/AREA URNS AUTO: <5 /HPF
TROPONIN I SERPL-MCNC: <0.01 NG/ML (ref 0–0.04)
UROBILINOGEN UR STRIP-ACNC: 0.2
WBC # SPEC AUTO: 7.77 X10(3)/MCL (ref 4.5–11.5)
WBC #/AREA URNS AUTO: <5 /HPF

## 2023-10-17 PROCEDURE — 25000003 PHARM REV CODE 250: Performed by: EMERGENCY MEDICINE

## 2023-10-17 PROCEDURE — 21400001 HC TELEMETRY ROOM

## 2023-10-17 PROCEDURE — 99285 EMERGENCY DEPT VISIT HI MDM: CPT | Mod: 25

## 2023-10-17 PROCEDURE — 0240U COVID/FLU A&B PCR: CPT | Performed by: EMERGENCY MEDICINE

## 2023-10-17 PROCEDURE — 96360 HYDRATION IV INFUSION INIT: CPT

## 2023-10-17 PROCEDURE — 84484 ASSAY OF TROPONIN QUANT: CPT | Performed by: EMERGENCY MEDICINE

## 2023-10-17 PROCEDURE — 81001 URINALYSIS AUTO W/SCOPE: CPT | Performed by: EMERGENCY MEDICINE

## 2023-10-17 PROCEDURE — 63600175 PHARM REV CODE 636 W HCPCS: Performed by: EMERGENCY MEDICINE

## 2023-10-17 PROCEDURE — 11000001 HC ACUTE MED/SURG PRIVATE ROOM

## 2023-10-17 PROCEDURE — 80053 COMPREHEN METABOLIC PANEL: CPT | Performed by: EMERGENCY MEDICINE

## 2023-10-17 PROCEDURE — 25000003 PHARM REV CODE 250: Performed by: INTERNAL MEDICINE

## 2023-10-17 PROCEDURE — 85025 COMPLETE CBC W/AUTO DIFF WBC: CPT | Performed by: EMERGENCY MEDICINE

## 2023-10-17 RX ORDER — TALC
6 POWDER (GRAM) TOPICAL NIGHTLY PRN
Status: DISCONTINUED | OUTPATIENT
Start: 2023-10-17 | End: 2023-10-20 | Stop reason: HOSPADM

## 2023-10-17 RX ORDER — LISINOPRIL 5 MG/1
5 TABLET ORAL DAILY
Status: DISCONTINUED | OUTPATIENT
Start: 2023-10-17 | End: 2023-10-20 | Stop reason: HOSPADM

## 2023-10-17 RX ORDER — GABAPENTIN 400 MG/1
400 CAPSULE ORAL DAILY
Status: DISCONTINUED | OUTPATIENT
Start: 2023-10-17 | End: 2023-10-20 | Stop reason: HOSPADM

## 2023-10-17 RX ORDER — SODIUM CHLORIDE 9 MG/ML
INJECTION, SOLUTION INTRAVENOUS CONTINUOUS
Status: DISCONTINUED | OUTPATIENT
Start: 2023-10-17 | End: 2023-10-19

## 2023-10-17 RX ORDER — SODIUM CHLORIDE 0.9 % (FLUSH) 0.9 %
10 SYRINGE (ML) INJECTION
Status: DISCONTINUED | OUTPATIENT
Start: 2023-10-17 | End: 2023-10-20 | Stop reason: HOSPADM

## 2023-10-17 RX ORDER — ATORVASTATIN CALCIUM 10 MG/1
20 TABLET, FILM COATED ORAL DAILY
Status: DISCONTINUED | OUTPATIENT
Start: 2023-10-17 | End: 2023-10-20 | Stop reason: HOSPADM

## 2023-10-17 RX ORDER — IBUPROFEN 200 MG
1 TABLET ORAL DAILY
Status: DISCONTINUED | OUTPATIENT
Start: 2023-10-17 | End: 2023-10-20 | Stop reason: HOSPADM

## 2023-10-17 RX ORDER — ASPIRIN 81 MG/1
81 TABLET ORAL DAILY
Status: DISCONTINUED | OUTPATIENT
Start: 2023-10-17 | End: 2023-10-20 | Stop reason: HOSPADM

## 2023-10-17 RX ORDER — MEMANTINE HYDROCHLORIDE 5 MG/1
10 TABLET ORAL 2 TIMES DAILY
Status: DISCONTINUED | OUTPATIENT
Start: 2023-10-17 | End: 2023-10-20 | Stop reason: HOSPADM

## 2023-10-17 RX ORDER — SODIUM CHLORIDE 9 MG/ML
1000 INJECTION, SOLUTION INTRAVENOUS
Status: COMPLETED | OUTPATIENT
Start: 2023-10-17 | End: 2023-10-17

## 2023-10-17 RX ORDER — HYDROCODONE BITARTRATE AND ACETAMINOPHEN 10; 325 MG/1; MG/1
1 TABLET ORAL 3 TIMES DAILY
Status: DISCONTINUED | OUTPATIENT
Start: 2023-10-17 | End: 2023-10-20 | Stop reason: HOSPADM

## 2023-10-17 RX ORDER — ESCITALOPRAM OXALATE 10 MG/1
20 TABLET ORAL DAILY
Status: DISCONTINUED | OUTPATIENT
Start: 2023-10-17 | End: 2023-10-20 | Stop reason: HOSPADM

## 2023-10-17 RX ADMIN — RIVAROXABAN 10 MG: 10 TABLET, FILM COATED ORAL at 04:10

## 2023-10-17 RX ADMIN — PREGABALIN 75 MG: 50 CAPSULE ORAL at 08:10

## 2023-10-17 RX ADMIN — HYDROCODONE BITARTRATE AND ACETAMINOPHEN 1 TABLET: 10; 325 TABLET ORAL at 08:10

## 2023-10-17 RX ADMIN — SODIUM CHLORIDE, POTASSIUM CHLORIDE, SODIUM LACTATE AND CALCIUM CHLORIDE 1000 ML: 600; 310; 30; 20 INJECTION, SOLUTION INTRAVENOUS at 03:10

## 2023-10-17 RX ADMIN — SODIUM CHLORIDE 1000 ML: 9 INJECTION, SOLUTION INTRAVENOUS at 07:10

## 2023-10-17 RX ADMIN — GABAPENTIN 400 MG: 400 CAPSULE ORAL at 04:10

## 2023-10-17 RX ADMIN — SODIUM CHLORIDE: 9 INJECTION, SOLUTION INTRAVENOUS at 03:10

## 2023-10-17 RX ADMIN — MEMANTINE 10 MG: 5 TABLET ORAL at 08:10

## 2023-10-17 RX ADMIN — HYDROCODONE BITARTRATE AND ACETAMINOPHEN 1 TABLET: 10; 325 TABLET ORAL at 04:10

## 2023-10-17 NOTE — NURSING
Nurses Note -- 4 Eyes      10/17/2023   3:23 PM      Skin assessed during: Admit      [x] No Altered Skin Integrity Present    []Prevention Measures Documented      [] Yes- Altered Skin Integrity Present or Discovered   [] LDA Added if Not in Epic (Describe Wound)   [] New Altered Skin Integrity was Present on Admit and Documented in LDA   [] Wound Image Taken    Wound Care Consulted? No    Attending Nurse:  Greyson Vasquez LPN    Second RN/Staff Member:   Isabella Hagan RN

## 2023-10-17 NOTE — H&P
[facility] - Emergency Dept    History & Physical      Patient Name: Rojas Foster  MRN: [id number]  Admission Date: 10/16/2023  Attending Physician: Aldo Dahl MD   Primary Care Provider: Vishal Correa MD         Patient information was obtained from ER records.     Subjective:     Principal Problem:<principal problem not specified>    Chief Complaint:   Chief Complaint   Patient presents with    Fatigue     Has been in physical therapy due to leg weakness - normally walks w/ walker, tonight was trying to go to bathroom and had diff walking, denies complaint at this time        HPI: 77 y/o male with history of chr low back pain htn arthritis neuropathy osteopenia cerebral leukoencephalopathy alzeimers sleep apnea presented to the ed with complaints of worsening weakness and confusion with gait instability and subsequently admitted for further work up and management    Past Medical History:   Diagnosis Date    Anxiety disorder, unspecified     Arthritis     HTN (hypertension)        Past Surgical History:   Procedure Laterality Date    BACK SURGERY      SHOULDER SURGERY      SHOULDER SURGERY Left        Review of patient's allergies indicates:   Allergen Reactions    Codeine Other (See Comments)     Other reaction(s): Confusion       No current facility-administered medications on file prior to encounter.     Current Outpatient Medications on File Prior to Encounter   Medication Sig    alendronate (FOSAMAX) 35 MG tablet Take 35 mg by mouth every 7 days.    aspirin (ECOTRIN) 81 MG EC tablet Take 81 mg by mouth once daily.    atorvastatin (LIPITOR) 20 MG tablet Take 20 mg by mouth once daily.    b complex vitamins tablet Take 1 tablet by mouth once daily.    COCONUT OIL ORAL Take 1,000 mg by mouth.    EScitalopram oxalate (LEXAPRO) 20 MG tablet Take 1 tablet (20 mg total) by mouth once daily.    ferrous sulfate (FEOSOL) Tab tablet Take 1 tablet by mouth daily with breakfast.     gabapentin (NEURONTIN) 400 MG capsule Take 1 capsule by mouth Daily.    HYDROcodone-acetaminophen (NORCO)  mg per tablet Take 1 tablet by mouth 3 (three) times daily.    levomefolate/algal oil (L-METHYLFOLATE FORMULA ORAL) Take by mouth.    magnesium 30 mg Tab Take 400 mg by mouth once.    magnesium oxide (MAG-OX) 400 mg (241.3 mg magnesium) tablet Take 400 mg by mouth once daily.    meloxicam (MOBIC) 15 MG tablet Take 1 tablet by mouth Daily.    memantine (NAMENDA) 10 MG Tab Take 1 tablet (10 mg total) by mouth 2 (two) times daily.    multivitamin with minerals tablet Take 1 tablet by mouth once daily.    mupirocin (BACTROBAN) 2 % ointment Apply topically 3 (three) times daily.    nicotine (NICODERM CQ) 21 mg/24 hr Place 1 patch onto the skin once daily.    polyethylene glycol (GLYCOLAX) 17 gram/dose powder Take 17 g by mouth once daily.    pregabalin (LYRICA) 75 MG capsule Take 75 mg by mouth.    propylene glycol/peg 400 (SYSTANE ULTRA OPHT) Apply to eye.    quinapriL (ACCUPRIL) 5 MG tablet Take 1 tablet by mouth Daily.    quinapril HCl (QUINAPRIL ORAL) Take 5 mg by mouth once daily.    rivaroxaban (XARELTO) 10 mg Tab Take 1 tablet (10 mg total) by mouth daily with dinner or evening meal.    [DISCONTINUED] triamterene-hydrochlorothiazide 37.5-25 mg (DYAZIDE) 37.5-25 mg per capsule Take 1 capsule by mouth every morning.     Family History       Problem Relation (Age of Onset)    Heart disease Mother    Hypertension Mother, Father          Tobacco Use    Smoking status: Never    Smokeless tobacco: Current     Types: Chew   Substance and Sexual Activity    Alcohol use: Not Currently    Drug use: Never    Sexual activity: Not Currently     Review of Systems   Constitutional:  Positive for fatigue.   HENT: Negative.     Eyes: Negative.    Respiratory: Negative.     Cardiovascular: Negative.    Gastrointestinal: Negative.    Endocrine: Negative.    Genitourinary: Negative.    Musculoskeletal: Negative.    Skin:  Negative.    Allergic/Immunologic: Negative.    Neurological:  Positive for dizziness and weakness.   Hematological: Negative.    Psychiatric/Behavioral: Negative.       Objective:     Vital Signs (Most Recent):  Temp: 98 °F (36.7 °C) (10/17/23 1152)  Pulse: (!) 58 (10/17/23 1401)  Resp: 17 (10/17/23 1401)  BP: 131/76 (10/17/23 1401)  SpO2: 95 % (10/17/23 1401) Vital Signs (24h Range):  Temp:  [98 °F (36.7 °C)-98.2 °F (36.8 °C)] 98 °F (36.7 °C)  Pulse:  [51-86] 58  Resp:  [13-18] 17  SpO2:  [92 %-98 %] 95 %  BP: (113-152)/(49-97) 131/76     Weight: 95.3 kg (210 lb)  Body mass index is 31.93 kg/m².    Physical Exam  Vitals reviewed.   Constitutional:       Appearance: Normal appearance.   HENT:      Head: Normocephalic and atraumatic.      Right Ear: Tympanic membrane and external ear normal.      Nose: Nose normal.      Mouth/Throat:      Mouth: Mucous membranes are moist.   Eyes:      Extraocular Movements: Extraocular movements intact.      Pupils: Pupils are equal, round, and reactive to light.   Cardiovascular:      Rate and Rhythm: Normal rate and regular rhythm.      Pulses: Normal pulses.      Heart sounds: Normal heart sounds.   Pulmonary:      Effort: Pulmonary effort is normal.      Breath sounds: Normal breath sounds.   Abdominal:      General: Abdomen is flat. Bowel sounds are normal.      Palpations: Abdomen is soft.   Musculoskeletal:         General: Normal range of motion.      Cervical back: Normal range of motion and neck supple.   Skin:     General: Skin is warm and dry.   Neurological:      General: No focal deficit present.      Mental Status: He is alert and oriented to person, place, and time.      Motor: Weakness present.   Psychiatric:         Mood and Affect: Mood normal.         Behavior: Behavior normal.           CRANIAL NERVES     CN III, IV, VI   Pupils are equal, round, and reactive to light.      Significant Labs: All pertinent labs within the past 24 hours have been reviewed.  Lab  Results   Component Value Date    WBC 7.77 10/17/2023    HGB 13.1 (L) 10/17/2023    HCT 40.6 (L) 10/17/2023    MCV 95.3 (H) 10/17/2023     10/17/2023         Recent Labs   Lab 10/17/23  0110      K 4.4   CO2 24   BUN 26.6*   CREATININE 1.37*   GLUCOSE 83   CALCIUM 9.5       Significant Imaging: I have reviewed all pertinent imaging results/findings within the past 24 hours.    Assessment/Plan:     There are no hospital problems to display for this patient.    VTE Risk Mitigation (From admission, onward)           Ordered     IP VTE HIGH RISK PATIENT  Once         10/17/23 0604     Place sequential compression device  Until discontinued         10/17/23 0604                  Leukoencephalomalacia  Gait instability  Generalized weakness  Chr low back pain  Lumbar radiculopathy with stensosi  Htn  Anxiety  Dementia  Deconditioning  Sleep apnea  Neuropathy  Arthritis  Osteopenia  mehdi    Plan :  Ivf  Mri  Pt/ot  Resume home meds  Labs in am  Alma nd dvt ppx  Code status full        Aldo Dahl MD  Department of Hospital Medicine   [facility] - Emergency Dept

## 2023-10-17 NOTE — PLAN OF CARE
Pt is , 2 children, has living will and wife Ivania [phone] is POA.    10/17/23 1056   Discharge Assessment   Assessment Type Discharge Planning Assessment   Confirmed/corrected address, phone number and insurance Yes   Confirmed Demographics Correct on Facesheet   Source of Information family   If unable to respond/provide information was family/caregiver contacted? Yes   Contact Name/Number wife Ivania [phone]   When was your last doctors appointment?   (Dr. Correa)   Communicated ALVARO with patient/caregiver Date not available/Unable to determine   People in Home spouse   Do you expect to return to your current living situation? Yes   Do you have help at home or someone to help you manage your care at home? Yes   Who are your caregiver(s) and their phone number(s)? Ivania mcpherson [phone]   Prior to hospitilization cognitive status: Unable to Assess   Current cognitive status: Unable to Assess   Walking or Climbing Stairs ambulation difficulty, requires equipment   Mobility Management rollator   Dressing/Bathing bathing difficulty, requires equipment   Dressing/Bathing Management shower chair   Home Accessibility wheelchair accessible   Home Layout Able to live on 1st floor   Equipment Currently Used at Home shower chair;rollator;other (see comments);raised toilet;CPAP;blood pressure machine;glucometer  (bedrails)   Readmission within 30 days? No   Patient currently being followed by outpatient case management? No   Do you currently have service(s) that help you manage your care at home? No   Do you take prescription medications? Yes  (Fills with [facility] on [address]   Do you have prescription coverage? Yes   Coverage Medicare   Do you have any problems affording any of your prescribed medications? No   Is the patient taking medications as prescribed? yes   Who is going to help you get home at discharge? wife   How do you get to doctors appointments? family or friend will provide   Are you on  dialysis? No   Do you take coumadin? No   DME Needed Upon Discharge  other (see comments)  (TBD)   Discharge Plan discussed with: Patient   Transition of Care Barriers None   Discharge Plan A Other  (TBD)   Physical Activity   On average, how many days per week do you engage in moderate to strenuous exercise (like a brisk walk)? 2 days   On average, how many minutes do you engage in exercise at this level? 60 min   Financial Resource Strain   How hard is it for you to pay for the very basics like food, housing, medical care, and heating? Not hard   Housing Stability   In the last 12 months, was there a time when you were not able to pay the mortgage or rent on time? N   In the last 12 months, how many places have you lived? 2   In the last 12 months, was there a time when you did not have a steady place to sleep or slept in a shelter (including now)? N   Transportation Needs   In the past 12 months, has lack of transportation kept you from medical appointments or from getting medications? no   In the past 12 months, has lack of transportation kept you from meetings, work, or from getting things needed for daily living? No   Food Insecurity   Within the past 12 months, you worried that your food would run out before you got the money to buy more. Never true   Within the past 12 months, the food you bought just didn't last and you didn't have money to get more. Never true   Stress   Do you feel stress - tense, restless, nervous, or anxious, or unable to sleep at night because your mind is troubled all the time - these days? Not at all   Social Connections   In a typical week, how many times do you talk on the phone with family, friends, or neighbors? More than 3   How often do you get together with friends or relatives? More than 3   How often do you attend Adventism or Congregational services? More than 4   Do you belong to any clubs or organizations such as Adventism groups, unions, fraternal or athletic groups, or school  groups? No   How often do you attend meetings of the clubs or organizations you belong to? Never   Are you , , , , never , or living with a partner?    Alcohol Use   Q1: How often do you have a drink containing alcohol? Never   Q2: How many drinks containing alcohol do you have on a typical day when you are drinking? None   Q3: How often do you have six or more drinks on one occasion? Never   OTHER   Name(s) of People in Home Ivania shen

## 2023-10-17 NOTE — ED PROVIDER NOTES
Encounter Date: 10/16/2023       History     Chief Complaint   Patient presents with    Fatigue     Has been in physical therapy due to leg weakness - normally walks w/ walker, tonight was trying to go to bathroom and had diff walking, denies complaint at this time     This is a 78-year-old male who has a history of chronic back problems and mobility issues at home his wife says that today he seemed more drowsy than usual she is having a hard time arousing him and get him to respond appropriately he did not want to get up out of bed.  He seems to be back to his baseline now and denies any complaints.  Patient denies any chest pain shortness of breath or abdominal pain moves his lower extremities equally he is no lateralizing deficits has been no recent medication changes.      Review of patient's allergies indicates:   Allergen Reactions    Codeine Other (See Comments)     Other reaction(s): Confusion     Past Medical History:   Diagnosis Date    Anxiety disorder, unspecified     Arthritis     HTN (hypertension)      Past Surgical History:   Procedure Laterality Date    BACK SURGERY      SHOULDER SURGERY      SHOULDER SURGERY Left      Family History   Problem Relation Age of Onset    Heart disease Mother     Hypertension Mother     Hypertension Father      Social History     Tobacco Use    Smoking status: Never    Smokeless tobacco: Current     Types: Chew   Substance Use Topics    Alcohol use: Not Currently    Drug use: Never     Review of Systems   Constitutional:  Negative for chills and fever.   Respiratory:  Negative for cough.    Cardiovascular:  Negative for chest pain.   Gastrointestinal:  Negative for abdominal pain, nausea and vomiting.   Neurological:  Negative for speech difficulty and headaches.   Psychiatric/Behavioral:  Positive for confusion.        Physical Exam     Initial Vitals [10/16/23 2256]   BP Pulse Resp Temp SpO2   (!) 128/49 80 18 98.2 °F (36.8 °C) 95 %      MAP       --         Physical  Exam    Constitutional: He appears well-developed and well-nourished. No distress.   HENT:   Head: Normocephalic and atraumatic.   Eyes: Conjunctivae and EOM are normal. Pupils are equal, round, and reactive to light. Right eye exhibits no discharge. Left eye exhibits no discharge. No scleral icterus.   Neck: No tracheal deviation present.   Cardiovascular:  Normal rate, regular rhythm, normal heart sounds and intact distal pulses.           No murmur heard.  Pulmonary/Chest: Breath sounds normal. No stridor. No respiratory distress. He has no wheezes. He has no rales.   Abdominal: Abdomen is soft. He exhibits no distension. There is no abdominal tenderness. There is no rebound and no guarding.   Musculoskeletal:         General: No tenderness or edema. Normal range of motion.     Neurological: He is alert and oriented to person, place, and time. He has normal strength and normal reflexes. No cranial nerve deficit.   Skin: Skin is warm and dry. No rash noted. No erythema. No pallor.   Psychiatric: He has a normal mood and affect. His behavior is normal. Judgment and thought content normal.         ED Course   Procedures  Labs Reviewed   COMPREHENSIVE METABOLIC PANEL - Abnormal; Notable for the following components:       Result Value    Blood Urea Nitrogen 26.6 (*)     Creatinine 1.37 (*)     Protein Total 7.8 (*)     All other components within normal limits   CBC WITH DIFFERENTIAL - Abnormal; Notable for the following components:    RBC 4.26 (*)     Hgb 13.1 (*)     Hct 40.6 (*)     MCV 95.3 (*)     MCHC 32.3 (*)     All other components within normal limits   COVID/FLU A&B PCR - Normal    Narrative:     The Xpert Xpress SARS-CoV-2/FLU/RSV plus is a rapid, multiplexed real-time PCR test intended for the simultaneous qualitative detection and differentiation of SARS-CoV-2, Influenza A, Influenza B, and respiratory syncytial virus (RSV) viral RNA in either nasopharyngeal swab or nasal swab specimens.          TROPONIN I - Normal   URINALYSIS, REFLEX TO URINE CULTURE - Normal   URINALYSIS, MICROSCOPIC - Normal   CBC W/ AUTO DIFFERENTIAL    Narrative:     The following orders were created for panel order CBC auto differential.  Procedure                               Abnormality         Status                     ---------                               -----------         ------                     CBC with Differential[[id number]]       Abnormal            Final result                 Please view results for these tests on the individual orders.          Imaging Results              CT Head Without Contrast (Preliminary result)  Result time 10/17/23 00:57:28      Preliminary result by Robert Turner MD (10/17/23 00:57:28)                   Narrative:    START OF REPORT:  Technique: CT of the head was performed without intravenous contrast with axial as well as coronal and sagittal images.    Comparison: Comparison is with study dated 2023-01-17 18:09:09.    Dosage Information: Automated exposure control was utilized.    Clinical history: Ams.    Findings:  Hemorrhage: No acute intracranial hemorrhage is seen.  CSF spaces: The ventricles, sulci and basal cisterns all appear severely prominent consistent with pronounced global cerebral atrophy. Additionally the ventricles appear dilated out of proportion to the sulci consistent with concurrent non-obstructive hydrocephalus.  Brain parenchyma: Unremarkable with preservation of the grey white junction throughout. Pronounced stable appearing microvascular change is seen in portions of the periventricular and deep white matter tracts.  Cerebellum: Unremarkable.  Vascular: Unremarkable venous sinuses. Mild stable appearing atheromatous calcification of the intracranial arteries is seen.  Sella and skull base: The sella appears to be within normal limits for age.  Cerebellopontine angles: Within normal limits.  Herniation: None.  Intracranial calcifications: Incidental note  is made of bilateral choroid plexus calcification. Incidental note is made of some pineal region calcification.  Calvarium: No acute linear or depressed skull fracture is seen.    Maxillofacial Structures:  Paranasal sinuses: There is some mucosal thickening in the bilateral ethmoid air cells. This is consistent with sinus disease. The rest of the paranasal sinuses appear clear.  Orbits: The orbits appear unremarkable.  Zygomatic arches: The zygomatic arches are intact and unremarkable.  Temporal bones and mastoids: The temporal bones and mastoids appear unremarkable.  TMJ: The mandibular condyles appear normally placed with respect to the mandibular fossa.  Nasal Bones: The nasal septum is deviated to right.    Visualized upper cervical spine: The visualized cervical spine appears unremarkable.      Impression:  1. No acute intracranial process identified. Details and findings as noted above.                                         Medications   lactated ringers bolus 1,000 mL (0 mLs Intravenous Stopped 10/17/23 0442)     Medical Decision Making  Amount and/or Complexity of Data Reviewed  Labs: ordered.  Radiology: ordered.               ED Course as of 10/17/23 0603   Tue Oct 17, 2023   0601 Pt not able to ambulate as well as usual per family, they are concerned about taking him home, will call Dr. Escobedo   [NL]      ED Course User Index  [NL] Marcos Evans MD                    Clinical Impression:   Final diagnoses:  [R53.1] Generalized weakness (Primary)  [R41.82] Altered mental status, unspecified altered mental status type        ED Disposition Condition    Admit Stable                Marcos Evans MD  10/17/23 0603

## 2023-10-17 NOTE — PLAN OF CARE
CM spoke with patient and son at bedside. Dc disposition pending PT/OT notes. According to son at bedside patient has been to [facility] previously. SNF and rehab choice list was given to patient and son at bedside.. CM will continue to follow regarding discharge recommendations.

## 2023-10-18 LAB
ALBUMIN SERPL-MCNC: 3.8 G/DL (ref 3.4–4.8)
ALBUMIN/GLOB SERPL: 1.3 RATIO (ref 1.1–2)
ALP SERPL-CCNC: 79 UNIT/L (ref 40–150)
ALT SERPL-CCNC: 11 UNIT/L (ref 0–55)
AST SERPL-CCNC: 19 UNIT/L (ref 5–34)
BASOPHILS # BLD AUTO: 0.03 X10(3)/MCL
BASOPHILS NFR BLD AUTO: 0.5 %
BILIRUB SERPL-MCNC: 0.7 MG/DL
BUN SERPL-MCNC: 18.1 MG/DL (ref 8.4–25.7)
CALCIUM SERPL-MCNC: 9.1 MG/DL (ref 8.8–10)
CHLORIDE SERPL-SCNC: 107 MMOL/L (ref 98–107)
CO2 SERPL-SCNC: 25 MMOL/L (ref 23–31)
CREAT SERPL-MCNC: 1.17 MG/DL (ref 0.73–1.18)
EOSINOPHIL # BLD AUTO: 0.55 X10(3)/MCL (ref 0–0.9)
EOSINOPHIL NFR BLD AUTO: 8.9 %
ERYTHROCYTE [DISTWIDTH] IN BLOOD BY AUTOMATED COUNT: 15.5 % (ref 11.5–17)
GFR SERPLBLD CREATININE-BSD FMLA CKD-EPI: >60 MLS/MIN/1.73/M2
GLOBULIN SER-MCNC: 3 GM/DL (ref 2.4–3.5)
GLUCOSE SERPL-MCNC: 84 MG/DL (ref 82–115)
HCT VFR BLD AUTO: 37.3 % (ref 42–52)
HGB BLD-MCNC: 11.9 G/DL (ref 14–18)
IMM GRANULOCYTES # BLD AUTO: 0.03 X10(3)/MCL (ref 0–0.04)
IMM GRANULOCYTES NFR BLD AUTO: 0.5 %
LYMPHOCYTES # BLD AUTO: 2.27 X10(3)/MCL (ref 0.6–4.6)
LYMPHOCYTES NFR BLD AUTO: 36.7 %
MCH RBC QN AUTO: 30.5 PG (ref 27–31)
MCHC RBC AUTO-ENTMCNC: 31.9 G/DL (ref 33–36)
MCV RBC AUTO: 95.6 FL (ref 80–94)
MONOCYTES # BLD AUTO: 0.83 X10(3)/MCL (ref 0.1–1.3)
MONOCYTES NFR BLD AUTO: 13.4 %
NEUTROPHILS # BLD AUTO: 2.48 X10(3)/MCL (ref 2.1–9.2)
NEUTROPHILS NFR BLD AUTO: 40 %
NRBC BLD AUTO-RTO: 0 %
PLATELET # BLD AUTO: 196 X10(3)/MCL (ref 130–400)
PMV BLD AUTO: 10.7 FL (ref 7.4–10.4)
POTASSIUM SERPL-SCNC: 4 MMOL/L (ref 3.5–5.1)
PROT SERPL-MCNC: 6.8 GM/DL (ref 5.8–7.6)
RBC # BLD AUTO: 3.9 X10(6)/MCL (ref 4.7–6.1)
SODIUM SERPL-SCNC: 144 MMOL/L (ref 136–145)
WBC # SPEC AUTO: 6.19 X10(3)/MCL (ref 4.5–11.5)

## 2023-10-18 PROCEDURE — 25000003 PHARM REV CODE 250: Performed by: INTERNAL MEDICINE

## 2023-10-18 PROCEDURE — 21400001 HC TELEMETRY ROOM

## 2023-10-18 PROCEDURE — 97166 OT EVAL MOD COMPLEX 45 MIN: CPT

## 2023-10-18 PROCEDURE — 80053 COMPREHEN METABOLIC PANEL: CPT | Performed by: INTERNAL MEDICINE

## 2023-10-18 PROCEDURE — 97162 PT EVAL MOD COMPLEX 30 MIN: CPT

## 2023-10-18 PROCEDURE — S4991 NICOTINE PATCH NONLEGEND: HCPCS | Performed by: INTERNAL MEDICINE

## 2023-10-18 PROCEDURE — 85025 COMPLETE CBC W/AUTO DIFF WBC: CPT | Performed by: INTERNAL MEDICINE

## 2023-10-18 RX ADMIN — SODIUM CHLORIDE: 9 INJECTION, SOLUTION INTRAVENOUS at 05:10

## 2023-10-18 RX ADMIN — GABAPENTIN 400 MG: 400 CAPSULE ORAL at 03:10

## 2023-10-18 RX ADMIN — NICOTINE 1 PATCH: 21 PATCH, EXTENDED RELEASE TRANSDERMAL at 08:10

## 2023-10-18 RX ADMIN — RIVAROXABAN 10 MG: 10 TABLET, FILM COATED ORAL at 03:10

## 2023-10-18 RX ADMIN — HYDROCODONE BITARTRATE AND ACETAMINOPHEN 1 TABLET: 10; 325 TABLET ORAL at 03:10

## 2023-10-18 RX ADMIN — LISINOPRIL 5 MG: 5 TABLET ORAL at 08:10

## 2023-10-18 RX ADMIN — ESCITALOPRAM OXALATE 20 MG: 10 TABLET ORAL at 08:10

## 2023-10-18 RX ADMIN — MEMANTINE 10 MG: 5 TABLET ORAL at 09:10

## 2023-10-18 RX ADMIN — HYDROCODONE BITARTRATE AND ACETAMINOPHEN 1 TABLET: 10; 325 TABLET ORAL at 09:10

## 2023-10-18 RX ADMIN — ATORVASTATIN CALCIUM 20 MG: 10 TABLET, FILM COATED ORAL at 08:10

## 2023-10-18 RX ADMIN — PREGABALIN 75 MG: 50 CAPSULE ORAL at 09:10

## 2023-10-18 RX ADMIN — ASPIRIN 81 MG: 81 TABLET, COATED ORAL at 08:10

## 2023-10-18 RX ADMIN — PREGABALIN 75 MG: 50 CAPSULE ORAL at 08:10

## 2023-10-18 RX ADMIN — HYDROCODONE BITARTRATE AND ACETAMINOPHEN 1 TABLET: 10; 325 TABLET ORAL at 08:10

## 2023-10-18 RX ADMIN — MEMANTINE 10 MG: 5 TABLET ORAL at 08:10

## 2023-10-18 NOTE — PLAN OF CARE
10/18/23 1528   Discharge Reassessment   Assessment Type Discharge Planning Reassessment   Did the patient's condition or plan change since previous assessment? Yes   Discharge Plan discussed with: Patient;Spouse/sig other   Communicated ALVARO with patient/caregiver Date not available/Unable to determine   Discharge Plan A Rehab   Discharge Plan B Rehab   DME Needed Upon Discharge  none   Transition of Care Barriers None   Post-Acute Status   Post-Acute Authorization Placement   Post-Acute Placement Status Referrals Sent   Patient choice form signed by patient/caregiver List with quality metrics by geographic area provided   Discharge Delays None known at this time     Dr. Carlson reached out to  requesting inpatient rehab referral. Spoke with patient and family at bedside regarding rehab choice list given yesterday. FOC obtained and placed in chart. Referral sent to [facility]. Second choice is [facility]

## 2023-10-18 NOTE — PROGRESS NOTES
[facility] Medicine  Progress Note    Patient Name: Rojas Foster  MRN: [id number]  Patient Class: IP- Inpatient   Admission Date: 10/16/2023  Length of Stay: 1 days  Attending Physician: Aldo Dahl MD  Primary Care Provider: Vishal Correa MD        Subjective:     Principal Problem:Generalized weakness    Interval History:   Today's info : seen and examined, no acute events overnight. Continues to improve   Renal indices better  Toleraing pt/ot  Mri neg for any acute changes    Review of Systems   Constitutional:  Positive for fatigue.   HENT: Negative.     Eyes: Negative.    Respiratory: Negative.     Cardiovascular: Negative.    Gastrointestinal:  Positive for abdominal distention.   Endocrine: Negative.    Genitourinary: Negative.    Musculoskeletal: Negative.    Allergic/Immunologic: Negative.    Neurological:  Positive for dizziness and weakness.   Psychiatric/Behavioral: Negative.       Objective:     Vital Signs (Most Recent):  Temp: 98.7 °F (37.1 °C) (10/18/23 0712)  Pulse: 68 (10/18/23 1136)  Resp: 18 (10/18/23 1136)  BP: (!) 141/87 (10/18/23 1136)  SpO2: (!) 93 % (10/18/23 1136) Vital Signs (24h Range):  Temp:  [97.5 °F (36.4 °C)-98.7 °F (37.1 °C)] 98.7 °F (37.1 °C)  Pulse:  [50-68] 68  Resp:  [16-20] 18  SpO2:  [93 %-100 %] 93 %  BP: (131-166)/(76-91) 141/87     Weight: 95.3 kg (210 lb)  Body mass index is 31.93 kg/m².    Intake/Output Summary (Last 24 hours) at 10/18/2023 1352  Last data filed at 10/18/2023 0746  Gross per 24 hour   Intake --   Output 1250 ml   Net -1250 ml      Physical Exam  Vitals reviewed.   Constitutional:       Appearance: Normal appearance.   HENT:      Head: Normocephalic and atraumatic.      Right Ear: Tympanic membrane and external ear normal.      Nose: Nose normal.      Mouth/Throat:      Mouth: Mucous membranes are moist.   Eyes:      Extraocular Movements: Extraocular movements intact.      Pupils: Pupils are  equal, round, and reactive to light.   Cardiovascular:      Rate and Rhythm: Normal rate and regular rhythm.      Pulses: Normal pulses.      Heart sounds: Normal heart sounds.   Pulmonary:      Effort: Pulmonary effort is normal.      Breath sounds: Normal breath sounds.   Abdominal:      General: Abdomen is flat. Bowel sounds are normal.      Palpations: Abdomen is soft.   Musculoskeletal:         General: Normal range of motion.      Cervical back: Normal range of motion and neck supple.   Skin:     General: Skin is warm and dry.   Neurological:      General: No focal deficit present.      Mental Status: He is alert and oriented to person, place, and time.      Motor: Weakness present.   Psychiatric:         Mood and Affect: Mood normal.         Behavior: Behavior normal.           Overview/Hospital Course: stable    Significant Labs: All pertinent labs within the past 24 hours have been reviewed.  Lab Results   Component Value Date    WBC 6.19 10/18/2023    HGB 11.9 (L) 10/18/2023    HCT 37.3 (L) 10/18/2023    MCV 95.6 (H) 10/18/2023     10/18/2023         Recent Labs   Lab 10/18/23  0438      K 4.0   CO2 25   BUN 18.1   CREATININE 1.17   GLUCOSE 84   CALCIUM 9.1       Significant Imaging: I have reviewed all pertinent imaging results/findings within the past 24 hours.    Assessment/Plan:      Active Diagnoses:    Diagnosis Date Noted POA    PRINCIPAL PROBLEM:  Generalized weakness [R53.1] 08/23/2022 Yes      Problems Resolved During this Admission:     VTE Risk Mitigation (From admission, onward)           Ordered     rivaroxaban tablet 10 mg  With dinner         10/17/23 1430     IP VTE HIGH RISK PATIENT  Once         10/17/23 0604     Place sequential compression device  Until discontinued         10/17/23 0604                  Leukoencephalomalacia  Gait instability  Generalized weakness  Chr low back pain  Lumbar radiculopathy with stensosi  Htn  Anxiety  Dementia  Deconditioning  Sleep  apnea  Neuropathy  Arthritis  Osteopenia  mehdi     Plan :  Ivf  Pt/ot  home meds  Labs in am  [place] nd dvt ppx  Cm for rehab    Aldo Dahl MD  Department of Hospital Medicine   [facility]

## 2023-10-18 NOTE — PLAN OF CARE
Problem: Occupational Therapy  Goal: Occupational Therapy Goal  Description: Goals to be met by: 11/18/2023     Patient will increase functional independence with ADLs by performing:    UE Dressing with Supervision.  LE Dressing with Minimal Assistance.  Grooming while seated at sink with Set-up Assistance.  Toileting from toilet with SBA for hygiene and clothing management.   Toilet transfer to toilet with Moderate Assistance.  Increased functional strength to 4/5 for increased functional mobility using LRAD and increased IND in ADLs.    Outcome: Ongoing, Progressing

## 2023-10-18 NOTE — PT/OT/SLP EVAL
Physical Therapy Evaluation    Patient Name:  Rojas Foster   MRN:  [id number]    Recommendations:     Discharge therapy intensity: high intensity   Discharge Equipment Recommendations: walker, rolling   Barriers to discharge: Ongoing medical needs    Assessment:     Rojas Foster is a 78 y.o. male admitted with a medical diagnosis of Generalized weakness.  He presents with the following impairments/functional limitations: weakness, impaired balance, impaired endurance. Pt lives with spouse in single level home with threshold to enter. Ambulates with rollator and needs assistance for ADLs/IADLs. Pt would benefit from IP rehab to address weakness, impaired balance, and assisted transfers and mobility.     Rehab Prognosis: Good; patient would benefit from acute skilled PT services to address these deficits and reach maximum level of function.    Recent Surgery: * No surgery found *      Plan:     During this hospitalization, patient to be seen 5 x/week to address the identified rehab impairments via gait training, therapeutic activities, therapeutic exercises and progress toward the following goals:    Plan of Care Expires:  11/18/23    Subjective     Chief Complaint: weakness  Patient/Family Comments/goals: get stronger  Pain/Comfort:  Pain Rating 1: 0/10    Patients cultural, spiritual, Latter-day conflicts given the current situation:      Living Environment:  Lives with wife in single level home with threshold to enter.   Prior to admission, patients level of function was Min A with ADLs/IADLs.  Equipment used at home: shower chair, rollator, raised toilet, CPAP.  DME owned (not currently used): none.  Upon discharge, patient will have assistance from wife.    Objective:     Communicated with nurse prior to session.  Patient found up in chair with    upon PT entry to room.    General Precautions: Standard, fall  Orthopedic Precautions:    Braces: N/A  Respiratory Status: Room air        Exams:  RLE Strength:  WFL  LLE Strength: WFL except 3+/5 ankle DF  Skin integrity: Visible skin intact      Functional Mobility:  Transfers:     Sit to Stand:  minimum assistance with rolling walker  Gait: 85' with RW and Min A to occasionally steer AD, pt had tendency to veer to R. Also short step length on LLE, needing frequent vc's for increased step length.       AM-PAC 6 CLICK MOBILITY  Total Score:11       Treatment & Education:  Education Provided:  Role and goals of PT, transfer training, bed mobility, gait training, balance training, safety awareness, assistive device, strengthening exercises, and importance of participating in PT to return to PLOF.      Patient left up in chair with all lines intact and call button in reach.    GOALS:   Multidisciplinary Problems       Physical Therapy Goals          Problem: Physical Therapy    Goal Priority Disciplines Outcome Goal Variances Interventions   Physical Therapy Goal     PT, PT/OT Ongoing, Progressing     Description: Goals to be met by: 23     Patient will increase functional independence with mobility by performin. Supine to sit with Stand-by Assistance  2. Sit to supine with Stand-by Assistance  3. Sit to stand transfer with Stand-by Assistance  4. Gait  x 200 feet with Modified [place] using Rolling Walker.                          History:     Past Medical History:   Diagnosis Date    Anxiety disorder, unspecified     Arthritis     HTN (hypertension)        Past Surgical History:   Procedure Laterality Date    BACK SURGERY      SHOULDER SURGERY      SHOULDER SURGERY Left        Time Tracking:     PT Received On: 10/18/23  PT Start Time: 1043     PT Stop Time: 1109  PT Total Time (min): 26 min     Billable Minutes: Evaluation 26      10/18/2023

## 2023-10-18 NOTE — PLAN OF CARE
Problem: Physical Therapy  Goal: Physical Therapy Goal  Description: Goals to be met by: 23     Patient will increase functional independence with mobility by performin. Supine to sit with Stand-by Assistance  2. Sit to supine with Stand-by Assistance  3. Sit to stand transfer with Stand-by Assistance  4. Gait  x 200 feet with Modified [place] using Rolling Walker.     Outcome: Ongoing, Progressing

## 2023-10-18 NOTE — PT/OT/SLP EVAL
Occupational Therapy  Evaluation    Name: Rojas Foster  MRN: [id number]  Recent Surgery: * No surgery found *      Recommendations:     Discharge Recommendations:  high intensity therapy  Discharge Equipment Recommendations:  bedside commode  Barriers to discharge:  Decreased caregiver support    Assessment:     Rojas Foster is a 78 y.o. male with a medical diagnosis of recent increased fatigue and weakness, AMS.  Overall pt tolerated therapy well, did experience fatigue and is wary about moving to quickly, he takes his time with all tasks and appears to have some problem solving impairments as seen when trying to turn RW to sit on chair and toilet. Pt required MOD A x2 for transfers/sit<>stand and MIN-MOD A x 2 for ambulation with cues to look where he's going and take larger steps with L foot. He presents with the following performance deficits affecting function: weakness, impaired endurance, impaired self care skills, impaired functional mobility, gait instability, impaired balance, impaired cognition.  Prior to admission pt was living with wife and required MOD A for ADLs, wife reports that he has also been more forgetful recently. Upon discharge, this patient would benefit from high intensity therapy.    Rehab Prognosis: Good; patient would benefit from acute skilled OT services to address these deficits and reach maximum level of function.       Plan:     Patient to be seen 4 x/week to address the above listed problems via self-care/home management, therapeutic activities, therapeutic exercises  Plan of Care Expires: 11/18/23  Plan of Care Reviewed with: patient, spouse    Subjective     Chief Complaint: I want to get out of this bed  Patient/Family Comments/goals: To get stronger    Occupational Profile:  Living Environment: pt lives with wife in a SLH with no DAVINA in the back (their preferred method of going inside) a walk in shower with a shower chair, grab bars and a high toilet.  Previous level of  function: Pt required MOD A for showering, dressing and ambulation using rollator. Pt able to perform toilet hygiene with IND.  Roles and Routines: /father  Equipment Used at Home: rollator, wheelchair, grab bar, shower chair  Assistance upon Discharge: wife, son    Pain/Comfort:  Pain Rating 1: 0/10    Patients cultural, spiritual, Gnosticism conflicts given the current situation: no    Objective:     OT communicated with JERRI Mosqueda prior to session.      Patient was found HOB elevated with SCD, telemetry, peripheral IV upon OT entry to room.    General Precautions: Standard, fall  Orthopedic Precautions: N/A  Braces: N/A    Vital Signs: Respiratory Status: on room air  HR: 88 throughout session    Bed Mobility:    Patient completed Scooting/Bridging with stand by assistance  Patient completed Supine to Sit with stand by assistance  Pt required increased time for bed mobility    Functional Mobility/Transfers:  Patient completed Sit <> Stand Transfer with moderate assistance and of 2 persons  with  rolling walker   Patient completed Bed <> Chair Transfer using Step Transfer technique with moderate assistance with rolling walker  Patient completed Toilet Transfer Step Transfer technique with maximal assistance with  rolling walker  Functional Mobility: Pt initially MIN A x2 but increased to MOD A x2 with fatigue, pt required increased time for ambulation taking very small steps with both LEs >L foot, sometimes shuffling with LLE 2/2 previous TIA. Wife reports pt normally moves slowly because he does not want to fall, but it has gotten worse recently because of the increased weakness. Pt required multiple verbal cues for keeping head up and taking larger steps with L foot. Pt presented with problem solving difficulties when asked to turn with the RW, not able to decide which direction to turn. When performing toilet transfer pt with decreased initiation and increased time to complete transfer.    Activities of  Daily Living:  Toileting: stand by assistance for toilet hygiene, MOD A for transfer/sit<>stand from toilet      Functional Cognition:  Orientation: oriented to Person, Place, Time, and Situation  Attention: Easily distracted  Alternating Attention: pt often had to stop task in order to answer questions/listen to commands  Command Following: Follows multistep commands with 75% accuracy  Problem Solving: Impaired. Pt noted to have trouble deciding which direction to turn when walking to toilet and chair despite instructions on which direction to turn from OTS, also increased processing time for 50% of commands    Visual Perceptual Skills:  Intact    Upper Extremity Function:  Right Upper Extremity:   Range of Motion: WFL  Strength: WFL except experiencing weakness recently 3+/5  WFL    Left Upper Extremity:  Range of Motion: WFL  Strength: overall 3+/5  WFL    Balance:   Impaired. pt relied heavily on RW to maintain upright standing position, sitting balance intact.    Therapeutic Positioning  Risk for acquired pressure injuries is increased due to impaired mobility.    OT interventions performed during the course of today's session:   Education was provided on benefits of and recommendations for therapeutic positioning    Skin assessment: all bony prominences were assessed    Findings: no redness or breakdown noted    OT recommendations for therapeutic positioning throughout hospitalization:   Follow [facility] Pressure Injury Prevention Protocol        Patient Education:  Patient and spouse were provided with verbal education education regarding OT role/goals/POC, fall prevention, and safety awareness.  Understanding was verbalized.     Patient left up in chair with all lines intact, call button in reach, chair alarm on, and wife present    GOALS:   Multidisciplinary Problems       Occupational Therapy Goals          Problem: Occupational Therapy    Goal Priority Disciplines Outcome Interventions   Occupational  Therapy Goal     OT, PT/OT Ongoing, Progressing    Description: Goals to be met by: 11/18/2023     Patient will increase functional independence with ADLs by performing:    UE Dressing with Supervision.  LE Dressing with Minimal Assistance.  Grooming while seated at sink with Set-up Assistance.  Toileting from toilet with Minimal Assistance for hygiene and clothing management.   Toilet transfer to toilet with Moderate Assistance.  Increased functional strength to 4/5 for increased functional mobility using LRAD and increased IND in ADLs.                         History:     Past Medical History:   Diagnosis Date    Anxiety disorder, unspecified     Arthritis     HTN (hypertension)          Past Surgical History:   Procedure Laterality Date    BACK SURGERY      SHOULDER SURGERY      SHOULDER SURGERY Left        Time Tracking:     OT Date of Treatment:    OT Start Time: 0934  OT Stop Time: 1028  OT Total Time (min): 54 min    Billable Minutes:Evaluation MOD  Self Care/Home Management 2    10/18/2023

## 2023-10-19 PROCEDURE — 25000003 PHARM REV CODE 250: Performed by: INTERNAL MEDICINE

## 2023-10-19 PROCEDURE — 97530 THERAPEUTIC ACTIVITIES: CPT

## 2023-10-19 PROCEDURE — 21400001 HC TELEMETRY ROOM

## 2023-10-19 PROCEDURE — 97110 THERAPEUTIC EXERCISES: CPT

## 2023-10-19 PROCEDURE — S4991 NICOTINE PATCH NONLEGEND: HCPCS | Performed by: INTERNAL MEDICINE

## 2023-10-19 RX ADMIN — HYDROCODONE BITARTRATE AND ACETAMINOPHEN 1 TABLET: 10; 325 TABLET ORAL at 08:10

## 2023-10-19 RX ADMIN — MEMANTINE 10 MG: 5 TABLET ORAL at 08:10

## 2023-10-19 RX ADMIN — LISINOPRIL 5 MG: 5 TABLET ORAL at 08:10

## 2023-10-19 RX ADMIN — PREGABALIN 75 MG: 50 CAPSULE ORAL at 08:10

## 2023-10-19 RX ADMIN — RIVAROXABAN 10 MG: 10 TABLET, FILM COATED ORAL at 05:10

## 2023-10-19 RX ADMIN — ATORVASTATIN CALCIUM 20 MG: 10 TABLET, FILM COATED ORAL at 08:10

## 2023-10-19 RX ADMIN — NICOTINE 1 PATCH: 21 PATCH, EXTENDED RELEASE TRANSDERMAL at 08:10

## 2023-10-19 RX ADMIN — GABAPENTIN 400 MG: 400 CAPSULE ORAL at 05:10

## 2023-10-19 RX ADMIN — ASPIRIN 81 MG: 81 TABLET, COATED ORAL at 08:10

## 2023-10-19 RX ADMIN — ESCITALOPRAM OXALATE 20 MG: 10 TABLET ORAL at 08:10

## 2023-10-19 RX ADMIN — HYDROCODONE BITARTRATE AND ACETAMINOPHEN 1 TABLET: 10; 325 TABLET ORAL at 05:10

## 2023-10-19 NOTE — PROGRESS NOTES
[facility]  Progress Note    Patient Name: Rojas Foster  MRN: [id number]  Patient Class: IP- Inpatient   Admission Date: 10/16/2023  Length of Stay: 2 days  Attending Physician: Aldo Dahl MD  Primary Care Provider: Vishal Correa MD        Subjective:     Principal Problem:Generalized weakness    Interval History:   Today's info : seen and examined, no acute events overnight. Continues to improve   Renal indices better  Toleraing pt/ot  Mri neg for any acute changes    Review of Systems   Constitutional:  Positive for fatigue.   HENT: Negative.     Eyes: Negative.    Respiratory: Negative.     Cardiovascular: Negative.    Gastrointestinal:  Positive for abdominal distention.   Endocrine: Negative.    Genitourinary: Negative.    Musculoskeletal: Negative.    Allergic/Immunologic: Negative.    Neurological:  Positive for dizziness and weakness.   Psychiatric/Behavioral: Negative.       Objective:     Vital Signs (Most Recent):  Temp: 97.4 °F (36.3 °C) (10/19/23 0700)  Pulse: 60 (10/19/23 0700)  Resp: 16 (10/19/23 0849)  BP: 116/65 (10/19/23 0700)  SpO2: (!) 94 % (10/19/23 0700) Vital Signs (24h Range):  Temp:  [96.8 °F (36 °C)-97.9 °F (36.6 °C)] 97.4 °F (36.3 °C)  Pulse:  [52-88] 60  Resp:  [] 16  SpO2:  [94 %-95 %] 94 %  BP: (115-132)/(65-82) 116/65     Weight: 95.3 kg (210 lb)  Body mass index is 31.93 kg/m².    Intake/Output Summary (Last 24 hours) at 10/19/2023 1411  Last data filed at 10/19/2023 0649  Gross per 24 hour   Intake --   Output 700 ml   Net -700 ml        Physical Exam  Vitals reviewed.   Constitutional:       Appearance: Normal appearance.   HENT:      Head: Normocephalic and atraumatic.      Right Ear: Tympanic membrane and external ear normal.      Nose: Nose normal.      Mouth/Throat:      Mouth: Mucous membranes are moist.   Eyes:      Extraocular Movements: Extraocular movements intact.      Pupils: Pupils are equal,  round, and reactive to light.   Cardiovascular:      Rate and Rhythm: Normal rate and regular rhythm.      Pulses: Normal pulses.      Heart sounds: Normal heart sounds.   Pulmonary:      Effort: Pulmonary effort is normal.      Breath sounds: Normal breath sounds.   Abdominal:      General: Abdomen is flat. Bowel sounds are normal.      Palpations: Abdomen is soft.   Musculoskeletal:         General: Normal range of motion.      Cervical back: Normal range of motion and neck supple.   Skin:     General: Skin is warm and dry.   Neurological:      General: No focal deficit present.      Mental Status: He is alert and oriented to person, place, and time.      Motor: Weakness present.   Psychiatric:         Mood and Affect: Mood normal.         Behavior: Behavior normal.           Overview/Hospital Course: stable    Significant Labs: All pertinent labs within the past 24 hours have been reviewed.  Lab Results   Component Value Date    WBC 6.19 10/18/2023    HGB 11.9 (L) 10/18/2023    HCT 37.3 (L) 10/18/2023    MCV 95.6 (H) 10/18/2023     10/18/2023         Recent Labs   Lab 10/18/23  0438      K 4.0   CO2 25   BUN 18.1   CREATININE 1.17   GLUCOSE 84   CALCIUM 9.1         Significant Imaging: I have reviewed all pertinent imaging results/findings within the past 24 hours.    Assessment/Plan:      Active Diagnoses:    Diagnosis Date Noted POA    PRINCIPAL PROBLEM:  Generalized weakness [R53.1] 08/23/2022 Yes      Problems Resolved During this Admission:     VTE Risk Mitigation (From admission, onward)           Ordered     rivaroxaban tablet 10 mg  With dinner         10/17/23 1430     IP VTE HIGH RISK PATIENT  Once         10/17/23 0604     Place sequential compression device  Until discontinued         10/17/23 0604                  Leukoencephalomalacia  Gait instability  Generalized weakness  Chr low back pain  Lumbar radiculopathy with stensosi  Htn  Anxiety  Dementia  Deconditioning  Sleep  apnea  Neuropathy  Arthritis  Osteopenia  mehdi     Plan :  Ivf  Pt/ot  home meds  Labs in am  [place] nd dvt ppx  Cm for rehab  Pending placement    Aldo Dahl MD  Department of Hospital Medicine   [facility]

## 2023-10-19 NOTE — PLAN OF CARE
Problem: Fall Injury Risk  Goal: Absence of Fall and Fall-Related Injury  Outcome: Ongoing, Progressing     Problem: Skin Injury Risk Increased  Goal: Skin Health and Integrity  Outcome: Ongoing, Progressing     Problem: Adult Inpatient Plan of Care  Goal: Readiness for Transition of Care  Outcome: Ongoing, Progressing     Problem: Adult Inpatient Plan of Care  Goal: Optimal Comfort and Wellbeing  Outcome: Ongoing, Progressing     Problem: Adult Inpatient Plan of Care  Goal: Absence of Hospital-Acquired Illness or Injury  Outcome: Ongoing, Progressing

## 2023-10-19 NOTE — PLAN OF CARE
10/19/23 1523   Medicare Message   Important Message from Medicare regarding Discharge Appeal Rights Given to patient/caregiver;Explained to patient/caregiver     IMM reviewed with pt's wife and she voiced understanding of IMM.

## 2023-10-19 NOTE — PLAN OF CARE
Katerin with [facility] stated they can accept pt tomorrow. I messaged Dr Carlson with the above info. and updated pt and his wife.

## 2023-10-19 NOTE — PT/OT/SLP PROGRESS
Physical Therapy Treatment    Patient Name:  Rojas Foster   MRN:  [id number]    Recommendations:     Discharge therapy intensity: high intensity   Discharge Equipment Recommendations: bedside commode  Barriers to discharge: Impaired mobility and Ongoing medical needs    Assessment:     Rojas Foster is a 78 y.o. male admitted with a medical diagnosis of Generalized weakness.  He presents with the following impairments/functional limitations: weakness, impaired endurance, impaired self care skills, impaired functional mobility, impaired balance, gait instability, impaired cognition, decreased safety awareness, pain.    Rehab Prognosis: Good; patient would benefit from acute skilled PT services to address these deficits and reach maximum level of function.    Recent Surgery: * No surgery found *      Plan:     During this hospitalization, patient to be seen 5 x/week to address the identified rehab impairments via gait training, therapeutic exercises, therapeutic activities, neuromuscular re-education and progress toward the following goals:    Plan of Care Expires:  11/18/23    Subjective     Chief Complaint: none  Patient/Family Comments/goals: none  Pain/Comfort:  Pain Rating 1: 4/10  Location 1: back  Pain Addressed 1: Reposition, Distraction  Pain Rating Post-Intervention 1: 4/10      Objective:     Communicated with nurse prior to session.  Patient found supine with SCD, telemetry upon PT entry to room.     General Precautions: Standard, fall  Orthopedic Precautions: N/A  Braces: N/A  Respiratory Status: Room air  Skin Integrity: Visible skin intact      Functional Mobility:  Bed Mobility:     Supine to Sit: minimum assistance  Transfers:  Sit to Stand:  minimum assistance with rolling walker  Gait: 75 ft with RW & CGA. Tendency to veer to right. Cueing for upright posture.   Balance: fair/poor    Therapeutic Exercises:  Patient performed seated Marches, Heel Raises, LAQ, Glute Sets, Resisted Hip ABD/ADD.  All performed 2 x 20 reps. 1# ankle weights on BLE    Education Provided:  Role and goals of PT, transfer training, bed mobility, gait training, balance training, safety awareness, assistive device, strengthening exercises, and importance of participating in PT to return to PLOF.    Patient left  on toilet  with all lines intact, call button in reach, spouse present, and educated on calling for assistance when ready to return to bed . CNA aware    GOALS:   Multidisciplinary Problems       Physical Therapy Goals          Problem: Physical Therapy    Goal Priority Disciplines Outcome Goal Variances Interventions   Physical Therapy Goal     PT, PT/OT Ongoing, Progressing     Description: Goals to be met by: 23     Patient will increase functional independence with mobility by performin. Supine to sit with Stand-by Assistance  2. Sit to supine with Stand-by Assistance  3. Sit to stand transfer with Stand-by Assistance  4. Gait  x 200 feet with Modified [place] using Rolling Walker.                          Time Tracking:     PT Received On: 10/19/23  PT Start Time: 1429     PT Stop Time: 1455  PT Total Time (min): 26 min     Billable Minutes: Therapeutic Activity 16 minutes and Therapeutic Exercise 10 minutes    Treatment Type: Treatment  PT/PTA: PT     Number of PTA visits since last PT visit: 1     10/19/2023

## 2023-10-19 NOTE — PLAN OF CARE
[facility] recommending TCU. Referral sent to [facility] via [facility] which is pt/wife's 2nd choice.

## 2023-10-20 VITALS
HEART RATE: 64 BPM | WEIGHT: 210 LBS | RESPIRATION RATE: 18 BRPM | TEMPERATURE: 98 F | SYSTOLIC BLOOD PRESSURE: 120 MMHG | OXYGEN SATURATION: 93 % | HEIGHT: 68 IN | BODY MASS INDEX: 31.83 KG/M2 | DIASTOLIC BLOOD PRESSURE: 79 MMHG

## 2023-10-20 PROBLEM — R53.1 GENERALIZED WEAKNESS: Status: RESOLVED | Noted: 2022-08-23 | Resolved: 2023-10-20

## 2023-10-20 PROCEDURE — 25000003 PHARM REV CODE 250: Performed by: INTERNAL MEDICINE

## 2023-10-20 PROCEDURE — 97530 THERAPEUTIC ACTIVITIES: CPT | Mod: CQ

## 2023-10-20 PROCEDURE — 97116 GAIT TRAINING THERAPY: CPT | Mod: CQ

## 2023-10-20 PROCEDURE — S4991 NICOTINE PATCH NONLEGEND: HCPCS | Performed by: INTERNAL MEDICINE

## 2023-10-20 RX ADMIN — HYDROCODONE BITARTRATE AND ACETAMINOPHEN 1 TABLET: 10; 325 TABLET ORAL at 08:10

## 2023-10-20 RX ADMIN — NICOTINE 1 PATCH: 21 PATCH, EXTENDED RELEASE TRANSDERMAL at 08:10

## 2023-10-20 RX ADMIN — PREGABALIN 75 MG: 50 CAPSULE ORAL at 08:10

## 2023-10-20 RX ADMIN — ESCITALOPRAM OXALATE 20 MG: 10 TABLET ORAL at 08:10

## 2023-10-20 RX ADMIN — LISINOPRIL 5 MG: 5 TABLET ORAL at 08:10

## 2023-10-20 RX ADMIN — MEMANTINE 10 MG: 5 TABLET ORAL at 08:10

## 2023-10-20 RX ADMIN — ATORVASTATIN CALCIUM 20 MG: 10 TABLET, FILM COATED ORAL at 08:10

## 2023-10-20 RX ADMIN — ASPIRIN 81 MG: 81 TABLET, COATED ORAL at 08:10

## 2023-10-20 NOTE — PT/OT/SLP PROGRESS
Physical Therapy Treatment    Patient Name:  Rojas Foster   MRN:  [id number]    Recommendations:     Discharge therapy intensity: high intensity   Discharge Equipment Recommendations: bedside commode  Barriers to discharge: Impaired mobility    Assessment:     Rojas Foster is a 78 y.o. male admitted with a medical diagnosis of Generalized weakness.  He presents with the following impairments/functional limitations: weakness, decreased ROM, impaired endurance, impaired balance, impaired functional mobility pt standing with spouse upon arrival, pt pending d/c to rehab today however motivated to work with therapy pt was able to walk further today with assistance given for balance.    Rehab Prognosis: Good; patient would benefit from acute skilled PT services to address these deficits and reach maximum level of function.    Recent Surgery: * No surgery found *      Plan:     During this hospitalization, patient to be seen 5 x/week to address the identified rehab impairments via gait training, therapeutic activities and progress toward the following goals:    Plan of Care Expires:  11/18/23    Subjective     Chief Complaint: n/a  Patient/Family Comments/goals: to get back to PLOF  Pain/Comfort:  Pain Rating 1: 0/10      Objective:     Communicated with nurse prior to session.  Patient found ambulatory in room/tan with   upon PT entry to room.     General Precautions: Standard, fall  Orthopedic Precautions: N/A  Braces: N/A  Respiratory Status: Room air  Skin Integrity: Visible skin intact      Functional Mobility:  Transfers:     Sit to Stand:  moderate assistance with rolling walker and sit<>stand from bedside chair performed 5x with pt noted to have a posterior lean with increased time noted to correct and to help pt lean forward  Gait: pt amb 100ft with RW- min A, pt presented with slow brody and short step length, pt noted to veer toward R side, chair in tow due to noted fatigue    Therapeutic  Activities/Exercises:  Semi supine therex BLE 10x- ankle pumps, heel slides, SAQ,hip abd/add, SLR    Education:  Patient and spouse were provided with verbal education education regarding mobility.  Understanding was verbalized, however additional teaching warranted.     Patient left up in chair with call button in reach and spouse present..    GOALS:   Multidisciplinary Problems       Physical Therapy Goals          Problem: Physical Therapy    Goal Priority Disciplines Outcome Goal Variances Interventions   Physical Therapy Goal     PT, PT/OT Ongoing, Progressing     Description: Goals to be met by: 23     Patient will increase functional independence with mobility by performin. Supine to sit with Stand-by Assistance  2. Sit to supine with Stand-by Assistance  3. Sit to stand transfer with Stand-by Assistance  4. Gait  x 200 feet with Modified [place] using Rolling Walker.                          Time Tracking:     PT Received On:    PT Start Time: 1310     PT Stop Time: 1333  PT Total Time (min): 23 min     Billable Minutes: Gait Training 15 and Therapeutic Activity 8    Treatment Type: Treatment  PT/PTA: PTA     Number of PTA visits since last PT visit: 2     10/20/2023

## 2023-10-20 NOTE — NURSING
Called report to Valeria at [facility], [facility] transport will be leaving to pick pt up @1400.     Pt stable and resting comfortably with family at bedside awaiting transport.         Greyson Vasquez LPN

## 2023-10-20 NOTE — PLAN OF CARE
Pt discharged, Katerin at [facility] anticipates transport around 1300.   Nursing will call report to Patricia at [phone]  Dr Caban accepting   All dc info has been faxed to Katerin at [phone].   Greyson will call report  Packet by chart will go with pt.

## 2023-10-20 NOTE — PLAN OF CARE
Problem: Adult Inpatient Plan of Care  Goal: Absence of Hospital-Acquired Illness or Injury  Outcome: Ongoing, Progressing     Problem: Adult Inpatient Plan of Care  Goal: Optimal Comfort and Wellbeing  Outcome: Ongoing, Progressing     Problem: Adult Inpatient Plan of Care  Goal: Readiness for Transition of Care  Outcome: Ongoing, Progressing     Problem: Fall Injury Risk  Goal: Absence of Fall and Fall-Related Injury  Outcome: Ongoing, Progressing     Problem: Skin Injury Risk Increased  Goal: Skin Health and Integrity  Outcome: Ongoing, Progressing

## 2023-10-20 NOTE — DISCHARGE SUMMARY
[facility] Hospital Medicine  Discharge Summary      Patient Name: Rojas Foster  MRN: [id number]  Admission Date: 10/16/2023  Hospital Length of Stay: 3 days  Discharge Date and Time:  10/20/2023 8:46 AM  Attending Physician: Aldo Dahl MD   Discharging Provider: Aldo Dahl MD  Discharge Provider Team: Networked reference to record PCT   Primary Care Provider: Kendall Correa MD        DC DIAGNOSES :  Leukoencephalomalacia  Gait instability  Generalized weakness  Chr low back pain  Lumbar radiculopathy with stensosi  Htn  Anxiety  Dementia  Deconditioning  Sleep apnea  Neuropathy  Arthritis  Osteopenia  mehdi    * No surgery found *      Hospital Course:   HPI: 77 y/o male with history of chr low back pain htn arthritis neuropathy osteopenia cerebral leukoencephalopathy alzeimers sleep apnea presented to the ed with complaints of worsening weakness and confusion with gait instability and subsequently admitted for further work up and management  Mri neg  Confusion improved  No further falls  Tolerating therapy  Dc to rehab for further care    Consults:   Consults (From admission, onward)          Status Ordering Provider     IP consult to case management  Once        Provider:  (Not yet assigned)    Acknowledged KENDALL CORREA            Final Active Diagnoses:      Problems Resolved During this Admission:    Diagnosis Date Noted Date Resolved POA    PRINCIPAL PROBLEM:  Generalized weakness [R53.1] 08/23/2022 10/20/2023 Yes      Discharged Condition: good    Disposition: Rehab Facility    Follow Up:    Patient Instructions:      Diet Adult Regular     Reason for not Ordering Smoking Cessation Referral     Order Specific Question Answer Comments   Reason for not ordering: Not medically appropriate at this time      Activity as tolerated     Medications:  Reconciled Home Medications:      Medication List        CONTINUE taking these medications      alendronate 35  MG tablet  Commonly known as: FOSAMAX  Take 35 mg by mouth every 7 days.     aspirin 81 MG EC tablet  Commonly known as: ECOTRIN  Take 81 mg by mouth once daily.     atorvastatin 20 MG tablet  Commonly known as: LIPITOR  Take 20 mg by mouth once daily.     b complex vitamins tablet  Take 1 tablet by mouth once daily.     COCONUT OIL ORAL  Take 1,000 mg by mouth.     EScitalopram oxalate 20 MG tablet  Commonly known as: LEXAPRO  Take 1 tablet (20 mg total) by mouth once daily.     ferrous sulfate Tab tablet  Commonly known as: FEOSOL  Take 1 tablet by mouth daily with breakfast.     gabapentin 400 MG capsule  Commonly known as: NEURONTIN  Take 1 capsule by mouth Daily.     HYDROcodone-acetaminophen  mg per tablet  Commonly known as: NORCO  Take 1 tablet by mouth 3 (three) times daily.     L-METHYLFOLATE FORMULA ORAL  Take by mouth.     magnesium 30 mg Tab  Take 400 mg by mouth once.     magnesium oxide 400 mg (241.3 mg magnesium) tablet  Commonly known as: MAG-OX  Take 400 mg by mouth once daily.     meloxicam 15 MG tablet  Commonly known as: MOBIC  Take 1 tablet by mouth Daily.     memantine 10 MG Tab  Commonly known as: NAMENDA  Take 1 tablet (10 mg total) by mouth 2 (two) times daily.     multivitamin with minerals tablet  Take 1 tablet by mouth once daily.     mupirocin 2 % ointment  Commonly known as: BACTROBAN  Apply topically 3 (three) times daily.     nicotine 21 mg/24 hr  Commonly known as: NICODERM CQ  Place 1 patch onto the skin once daily.     polyethylene glycol 17 gram/dose powder  Commonly known as: GLYCOLAX  Take 17 g by mouth once daily.     pregabalin 75 MG capsule  Commonly known as: LYRICA  Take 75 mg by mouth.     * quinapriL 5 MG tablet  Commonly known as: ACCUPRIL  Take 1 tablet by mouth Daily.     * QUINAPRIL ORAL  Take 5 mg by mouth once daily.     rivaroxaban 10 mg Tab  Commonly known as: XARELTO  Take 1 tablet (10 mg total) by mouth daily with dinner or evening meal.     SYSTANE ULTRA  "OPHT  Apply to eye.           * This list has 2 medication(s) that are the same as other medications prescribed for you. Read the directions carefully, and ask your doctor or other care provider to review them with you.                  Significant Diagnostic Studies: Labs: BMP: No results for input(s): "GLU", "NA", "K", "CL", "CO2", "BUN", "CREATININE", "CALCIUM", "MG" in the last 48 hours.    Pending Diagnostic Studies:       None          Indwelling Lines/Drains at time of discharge:   Lines/Drains/Airways       None                   Time spent on the discharge of patient: 32 minutes         Aldo Dahl MD  Department of Hospital Medicine  [facility]      "

## 2023-10-26 NOTE — PHYSICIAN QUERY
PT Name: Rojas Foster  MR #: [id number]    DOCUMENTATION CLARIFICATION     CDS/: Willow Silva RN BSN               Contact information:[email or web address]  This form is a permanent document in the medical record.     Query Date: October 26, 2023    By submitting this query, we are merely seeking further clarification of documentation. Please utilize your independent clinical judgment when addressing the question(s) below.    The Medical Record contains the following:   Indicators   Supporting Clinical Findings Location in Medical Record   x AMS, Confusion,  LOC, etc.  10/17 ED  This is a 78-year-old male who has a history of chronic back problems and mobility issues at home his wife says that today he seemed more drowsy than usual she is having a hard time arousing him and get him to respond appropriately he did not want to get up out of bed.  He seems to be back to his baseline now and denies any complaints.    Altered mental status, unspecified altered mental status type    10/17 H&P  presented to the ed with complaints of worsening weakness and confusion with gait instability    10/20 D/C summary  Mri neg  Confusion improved 10/17/23 ED                    10/17/23 H&P        10/20/23 D/C summary   x Acute/Chronic Illness 10/17 H&P  NANCY  Leukoencephalomalacia  Gait instability  Generalized weakness  Dementia  Deconditioning  Sleep apnea  Neuropathy  Arthritis  Osteopenia   10/17/23 H&P   x Radiology Findings 10/17 MRI Brain  Images are degraded by artifacts caused by the patient motion.  There is severe cerebral leukoencephalopathy which likely is caused by chronic microangiopathic ischemia with extensive periventricular and deep white matter T2 FLAIR hyperintense signals.  There is generalized cerebral and cerebellar cortical volume loss.  Gradient echo sequences demonstrate no evidence of de phasing artifact to suggest hemorrhagic byproducts. No evidence of diffusion restriction or ADC map signal  drop out to suggest acute infarc 10/17/23 Radiology    Electrolyte Imbalance      Medication     x Treatment         10/17 IV LR 1000 ml bolus  10/17 IV 0.9% NaCl  1000 ml @ 125 ml/hr 10/17/23 Infusion summary    Other       The noted clinical guidelines are only system guidelines and do not replace the providers clinical judgment.    The National [place] of Neurologic Disorders and Stroke (NINDS) of the NIH describes encephalopathy as any diffuse disease of the brain that alters brain function or structure.    Provider, please clarify the  Altered mental status:  [  x ] Metabolic Encephalopathy (Due to electrolyte imbalance, metabolic derangements, or infectious processes, includes Septic Encephalopathy, Uremic Encephalopathy) superimposed on Dementia   [   ] Other neurological condition (please specify condition): __________         Please document in your progress notes daily for the duration of treatment until resolved, and include in your discharge summary.    References:  YUKI Valverde RN, CCDS. (2018, June 9). Notes from the Instructor: Encephalopathy tips. Retrieved October 22, 2020, from https://acdis.org/articles/note-instructor-encephalopathy-tips    ICD-9-CM Coding Clinic First Quarter 2013, Effective with discharges: October 21, 2013 [facility] § Seizure with encephalopathy due to postictal state (2013).    ICD-10-CM/PCS [employer] Integrated Codebook (Version V.20.8.10.0) [Computer software]. (2020). Retrieved October 21, 2020.    National [place] [employer] of Neurological Disorders and Stroke. (2019, March 27). Retrieved October 22, 2020, from https://www.ninds.nih.gov/Disorders/All-Disorders/Hmrlsluslskfug-Zxfhlsjplbu-Anfc    Form No. 16734

## 2023-10-31 NOTE — PHYSICIAN QUERY
PT Name: Rojas Foster  MR #: [id number]     DOCUMENTATION CLARIFICATION      CDS/: Willow Silva RN BSN              Contact information:[email or web address]  This form is a permanent document in the medical record.     Query Date: October 31, 2023    By submitting this query, we are merely seeking further clarification of documentation.  Please utilize your independent clinical judgment when addressing the question(s) below.     The Medical Record contains the following:    Clinical Information Location in Medical Record   10/17/23 ED  Has been in physical therapy due to leg weakness-normally walks with walker, tonight was trying to go to bathroom and had difficulty walking.  Final diagnoses:  Generalized weakness (Primary)  Altered mental status, unspecified altered mental status type    10/17/23 H&P  77 y/o male with history of chr low back pain htn arthritis neuropathy osteopenia cerebral leukoencephalopathy alzeimers sleep apnea presented to the ed with complaints of worsening weakness and confusion with gait instability and subsequently admitted for further work up and management.  NANCY  Gait instability  Generalized weakness  Chr low back pain  Lumbar radiculopathy with stenosis  Dementia  Deconditioning    10/20/23 D/C Summary  Principal problem: Generalized weakness     D/C Diagnoses:  Leukoencephalomalacia  Gait instability  Generalized weakness  Chr low back pain  Lumbar radiculopathy with stensosi  Htn  Anxiety  Dementia  Deconditioning  Sleep apnea  Neuropathy  Arthritis  Osteopenia  nancy    D/C to rehab for further care    Orders  10/17/23  Progressive Mobility Protocol (mobilize patient to their highest level of functioning at least twice daily)   10/17/23 ED            10/17/23 H&P                          10/20/23 D/C Summary                                          10/17/23 Orders     Please document your best medical opinion regarding the etiology of Worsening Weakness:       [  x ]  Due to NANCY   [   ] Other etiology (please specify):___________________             Please document in your progress notes daily for the duration of treatment, until resolved, and include in your discharge summary.

## 2023-12-13 PROBLEM — G47.33 OSA ON CPAP: Status: ACTIVE | Noted: 2023-12-13

## 2023-12-13 NOTE — PROGRESS NOTES
Subjective     Patient ID: Rojas Foster is a 78 y.o. male.    Chief Complaint: AD  and Sleep Apnea    HPI  Switched from zoloft to lexapro at last appt  Stopped taking the lexapro (caused excessive sedation)  Went to rehab, it was still listed on his med list, so they began giving it to him again & he had excessive sedation with it for the second time    Wife has since switched him back to zoloft 75mg/day  Here with son today (wife sent notes)  His moods are good, not anxious, not agitated, not depressed  Wants to stay on zoloft    On namenda 10mg bid    Son states they would like to switch PCPs...    Review of Systems  A 14pt ROS was reviewed & is negative unless otherwise documented in the HPI       Objective     Physical Exam  GENERAL: NAD, calm, cooperative, appropriate  Awake/alert  Well groomed  RESP: CTAB  HEART: RRR  No LE edema  MENTAL STATUS: Poor STM, had to repeat myself every few minutes on some things  SPEECH/LANGUAGE: clear, fluent  CN:  Perrla, eomi, vff, gaze conjugate  No tactile or motor facial asymmetry  Tongue protrudes midline  Motor: no focal weakness  Cerebellar: no tremor or dysmetria  Sensory: normal to tactile stim/vibration  DTRs: normal +2, symmetric  Gait: not observed, in WC       Assessment and Plan     1. Alzheimer's disease  -     sertraline (ZOLOFT) 50 MG tablet; Take 1.5 tablets (75 mg total) by mouth once daily.  Dispense: 135 tablet; Refill: 4    2. KELSEY on CPAP    3. Anxiety  -     sertraline (ZOLOFT) 50 MG tablet; Take 1.5 tablets (75 mg total) by mouth once daily.  Dispense: 135 tablet; Refill: 4      Cont zoloft 75mg/day & namenda 10mg bid  D/c lexapro  Gave PCP reccs for them to consider  F/u 6mo    BRYAN Mitchell-BC         Follow up in about 6 months (around 6/14/2024) for AD.

## 2023-12-14 ENCOUNTER — OFFICE VISIT (OUTPATIENT)
Dept: NEUROLOGY | Facility: CLINIC | Age: 78
End: 2023-12-14
Payer: MEDICARE

## 2023-12-14 VITALS
HEIGHT: 68 IN | BODY MASS INDEX: 31.83 KG/M2 | DIASTOLIC BLOOD PRESSURE: 60 MMHG | WEIGHT: 210 LBS | SYSTOLIC BLOOD PRESSURE: 108 MMHG

## 2023-12-14 DIAGNOSIS — G30.9 ALZHEIMER'S DISEASE: Primary | ICD-10-CM

## 2023-12-14 DIAGNOSIS — F02.80 ALZHEIMER'S DISEASE: Primary | ICD-10-CM

## 2023-12-14 DIAGNOSIS — F41.9 ANXIETY: ICD-10-CM

## 2023-12-14 DIAGNOSIS — G47.33 OSA ON CPAP: ICD-10-CM

## 2023-12-14 PROCEDURE — 99214 OFFICE O/P EST MOD 30 MIN: CPT | Mod: S$PBB,,, | Performed by: NURSE PRACTITIONER

## 2023-12-14 PROCEDURE — 99999 PR PBB SHADOW E&M-EST. PATIENT-LVL IV: CPT | Mod: PBBFAC,,, | Performed by: NURSE PRACTITIONER

## 2023-12-14 PROCEDURE — 99999 PR PBB SHADOW E&M-EST. PATIENT-LVL IV: ICD-10-PCS | Mod: PBBFAC,,, | Performed by: NURSE PRACTITIONER

## 2023-12-14 PROCEDURE — 99214 OFFICE O/P EST MOD 30 MIN: CPT | Mod: PBBFAC | Performed by: NURSE PRACTITIONER

## 2023-12-14 PROCEDURE — 99214 PR OFFICE/OUTPT VISIT, EST, LEVL IV, 30-39 MIN: ICD-10-PCS | Mod: S$PBB,,, | Performed by: NURSE PRACTITIONER

## 2023-12-14 RX ORDER — SERTRALINE HYDROCHLORIDE 50 MG/1
75 TABLET, FILM COATED ORAL DAILY
Qty: 135 TABLET | Refills: 4 | Status: SHIPPED | OUTPATIENT
Start: 2023-12-14 | End: 2025-03-08

## 2023-12-14 RX ORDER — SERTRALINE HYDROCHLORIDE 50 MG/1
75 TABLET, FILM COATED ORAL DAILY
COMMUNITY
End: 2023-12-14 | Stop reason: SDUPTHER

## 2023-12-14 NOTE — PATIENT INSTRUCTIONS
"Patient Education       Dementia (Including Alzheimer Disease)   The Basics   Written by the doctors and editors at [facility]   What is dementia? -- Dementia is the general term for a group of brain disorders that cause memory problems and make it hard to think clearly (figure 1).  What symptoms does dementia cause? -- The symptoms of dementia often start off very mild and get worse slowly. Symptoms can include:  Forgetfulness  Acting confused or disoriented  Trouble with speech and writing (for example, not being able to find the right words for things)  Trouble concentrating and reasoning  Problems with tasks such as paying bills or balancing a checkbook  Getting lost in familiar places  As dementia gets worse, people might:  Have episodes of anger or aggression  See things that aren't there or believe things that aren't true  Be unable to eat, bathe, dress, or do other everyday tasks  Lose bladder and bowel control  What are the different kinds of dementia? -- The most common kinds include:  Alzheimer disease - Alzheimer disease is the most common cause of dementia. It is a disorder in which brain cells slowly die over time.  Vascular dementia - Vascular dementia happens when parts of the brain do not get enough blood. This can happen when blood vessels in the brain get blocked with blood clots or damaged by high blood pressure or aging. This form of dementia is most common among people who have had strokes or who are at risk for strokes.  Parkinson disease dementia - Parkinson disease is a brain disorder that affects movement. It causes trembling, stiffness, and slowness. As Parkinson disease gets worse, some people develop dementia. "Lewy body dementia" is a related form of dementia.   Other causes of dementia - Dementia can also happen if a person's brain has been damaged. For example, having many head injuries can lead to dementia.  Should I see a doctor or nurse? -- Yes, you should see a doctor or nurse if " you think you or someone close to you is showing signs of dementia. Sometimes memory loss and confusion are caused by medical problems other than dementia that can be treated. For example, people with diabetes sometimes show signs of confusion when their blood sugar is not well controlled.  Are there tests I should have? -- Your doctor or nurse will decide which tests you should have based on your individual situation. Many people with signs of dementia do not need a brain scan. That's because the tests that are most useful are the ones that look at how you answer questions and do certain tasks. Even so, your doctor might want to do a brain scan (either CT or MRI) to make sure that your symptoms are not caused by a problem unrelated to dementia.  How is dementia treated? -- That depends on what kind of dementia you have. If you have Alzheimer disease, there are medicines that might help some. If you have vascular dementia, your doctor will focus on keeping your blood pressure and cholesterol as close to normal as possible. Doing that can help reduce further damage to the brain.  Sadly, there really aren't good treatments for most types of dementia. But doctors can sometimes treat troubling symptoms that come with dementia, such as depression or anxiety.  How do I stay safe? -- If you have dementia, you might not be aware of how much your condition affects you. Trust your family and friends to tell you when it is no longer safe for you to drive, cook, or do other things that could be dangerous.  Be aware, too, that people with dementia often fall and hurt themselves. To reduce the risk of falls, it's a good idea to:  Secure loose rugs or use non-skid backing on rugs  Tuck away loose wires or electrical cords  Wear sturdy, comfortable shoes  Keep walkways well lit  Can dementia be prevented? -- There are no proven ways to prevent dementia. But here are some things that seem to help keep the brain healthy:  Physical  activity  A healthy diet  Social interaction  All topics are updated as new evidence becomes available and our peer review process is complete.  This topic retrieved from [employer] on: Sep 21, 2021.  Topic 93308 Version 15.0  Release: 29.4.2 - C29.263  © 2021 UpToDate, Inc. and/or its affiliates. All rights reserved.  figure 1: Dementia caused by changes in the brain     Different forms of dementia are linked to changes in the brain. Alzheimer disease causes many parts of the brain to shrink. Parkinson disease damages parts of the brain involved in movement. Vascular dementia happens when the blood vessels that supply the brain are diseased.  Graphic 52080 Version 2.0    Consumer Information Use and Disclaimer   This information is not specific medical advice and does not replace information you receive from your health care provider. This is only a brief summary of general information. It does NOT include all information about conditions, illnesses, injuries, tests, procedures, treatments, therapies, discharge instructions or life-style choices that may apply to you. You must talk with your health care provider for complete information about your health and treatment options. This information should not be used to decide whether or not to accept your health care provider's advice, instructions or recommendations. Only your health care provider has the knowledge and training to provide advice that is right for you. The use of this information is governed by the [employer] End User License Agreement, available at https://www.[employer]/en/solutions/[employer]/about/jj.The use of [employer] content is governed by the [employer] Terms of Use. ©2021 UpToDate, Inc. All rights reserved.  Copyright   © 2021 UpToDate, Inc. and/or its affiliates. All rights reserved.

## 2024-02-06 ENCOUNTER — LAB VISIT (OUTPATIENT)
Dept: LAB | Facility: HOSPITAL | Age: 79
End: 2024-02-06
Attending: INTERNAL MEDICINE
Payer: MEDICARE

## 2024-02-06 DIAGNOSIS — E78.2 MIXED HYPERLIPIDEMIA: ICD-10-CM

## 2024-02-06 DIAGNOSIS — I10 ESSENTIAL HYPERTENSION, MALIGNANT: ICD-10-CM

## 2024-02-06 DIAGNOSIS — E78.00 PURE HYPERCHOLESTEROLEMIA: ICD-10-CM

## 2024-02-06 DIAGNOSIS — Z00.01 ENCOUNTER FOR GENERAL ADULT MEDICAL EXAMINATION WITH ABNORMAL FINDINGS: Primary | ICD-10-CM

## 2024-02-06 DIAGNOSIS — D64.9 ANEMIA, UNSPECIFIED TYPE: ICD-10-CM

## 2024-02-06 LAB
ALBUMIN SERPL-MCNC: 4.2 G/DL (ref 3.4–4.8)
ALBUMIN/GLOB SERPL: 1.2 RATIO (ref 1.1–2)
ALP SERPL-CCNC: 81 UNIT/L (ref 40–150)
ALT SERPL-CCNC: 15 UNIT/L (ref 0–55)
AST SERPL-CCNC: 20 UNIT/L (ref 5–34)
BASOPHILS # BLD AUTO: 0.04 X10(3)/MCL
BASOPHILS NFR BLD AUTO: 0.6 %
BILIRUB SERPL-MCNC: 0.6 MG/DL
BUN SERPL-MCNC: 35.7 MG/DL (ref 8.4–25.7)
CALCIUM SERPL-MCNC: 9.5 MG/DL (ref 8.8–10)
CHLORIDE SERPL-SCNC: 105 MMOL/L (ref 98–107)
CHOLEST SERPL-MCNC: 183 MG/DL
CHOLEST/HDLC SERPL: 4 {RATIO} (ref 0–5)
CK SERPL-CCNC: 43 U/L (ref 30–200)
CO2 SERPL-SCNC: 27 MMOL/L (ref 23–31)
CREAT SERPL-MCNC: 1.48 MG/DL (ref 0.73–1.18)
EOSINOPHIL # BLD AUTO: 0.36 X10(3)/MCL (ref 0–0.9)
EOSINOPHIL NFR BLD AUTO: 5.6 %
ERYTHROCYTE [DISTWIDTH] IN BLOOD BY AUTOMATED COUNT: 15.1 % (ref 11.5–17)
GFR SERPLBLD CREATININE-BSD FMLA CKD-EPI: 48 MLS/MIN/1.73/M2
GLOBULIN SER-MCNC: 3.4 GM/DL (ref 2.4–3.5)
GLUCOSE SERPL-MCNC: 86 MG/DL (ref 82–115)
HCT VFR BLD AUTO: 41.3 % (ref 42–52)
HDLC SERPL-MCNC: 49 MG/DL (ref 35–60)
HGB BLD-MCNC: 12.9 G/DL (ref 14–18)
IMM GRANULOCYTES # BLD AUTO: 0.07 X10(3)/MCL (ref 0–0.04)
IMM GRANULOCYTES NFR BLD AUTO: 1.1 %
LDLC SERPL CALC-MCNC: 107 MG/DL (ref 50–140)
LYMPHOCYTES # BLD AUTO: 1.92 X10(3)/MCL (ref 0.6–4.6)
LYMPHOCYTES NFR BLD AUTO: 29.9 %
MCH RBC QN AUTO: 31 PG (ref 27–31)
MCHC RBC AUTO-ENTMCNC: 31.2 G/DL (ref 33–36)
MCV RBC AUTO: 99.3 FL (ref 80–94)
MONOCYTES # BLD AUTO: 0.53 X10(3)/MCL (ref 0.1–1.3)
MONOCYTES NFR BLD AUTO: 8.3 %
NEUTROPHILS # BLD AUTO: 3.5 X10(3)/MCL (ref 2.1–9.2)
NEUTROPHILS NFR BLD AUTO: 54.5 %
NRBC BLD AUTO-RTO: 0 %
PLATELET # BLD AUTO: 217 X10(3)/MCL (ref 130–400)
PLATELETS.RETICULATED NFR BLD AUTO: 5.4 % (ref 0.9–11.2)
PMV BLD AUTO: 10.9 FL (ref 7.4–10.4)
POTASSIUM SERPL-SCNC: 5.2 MMOL/L (ref 3.5–5.1)
PROT SERPL-MCNC: 7.6 GM/DL (ref 5.8–7.6)
RBC # BLD AUTO: 4.16 X10(6)/MCL (ref 4.7–6.1)
SODIUM SERPL-SCNC: 140 MMOL/L (ref 136–145)
TRIGL SERPL-MCNC: 134 MG/DL (ref 34–140)
TSH SERPL-ACNC: 2.2 UIU/ML (ref 0.35–4.94)
VLDLC SERPL CALC-MCNC: 27 MG/DL
WBC # SPEC AUTO: 6.42 X10(3)/MCL (ref 4.5–11.5)

## 2024-02-06 PROCEDURE — 84443 ASSAY THYROID STIM HORMONE: CPT

## 2024-02-06 PROCEDURE — 82550 ASSAY OF CK (CPK): CPT

## 2024-02-06 PROCEDURE — 80053 COMPREHEN METABOLIC PANEL: CPT

## 2024-02-06 PROCEDURE — 85025 COMPLETE CBC W/AUTO DIFF WBC: CPT

## 2024-02-06 PROCEDURE — 80061 LIPID PANEL: CPT

## 2024-02-06 PROCEDURE — 36415 COLL VENOUS BLD VENIPUNCTURE: CPT

## 2024-03-19 NOTE — PROGRESS NOTES
Subjective     Patient ID: Rojas Foster is a 79 y.o. male.    Chief Complaint: Alzheimer's Disease (The patient wife states that when the patient decides to walk she can tell a difference in his walk. It was also voiced that he can longer write his name. )    HPI  On namenda 10mg bid & zoloft 75mg/day    Called yesterday for an appt b/c AD is progressing...     Here with wife & son  More difficulty walking (like prev, will stop & close his eyes)  Memory loss slowly progressing  Can no longer write his name; can't read  Has no hobbies & doesn't really want to do anything either  He did better (mentally & physically) when he was in rehab or doing PT.  Afraid to fall  He spends 15hrs/day in the wheelchair  Little interaction/conversation at home; TV stays on, but is on mute.  No lights on in the house  Wants to sleep all day; sleeps fine at night    S[place]on on cpap; compliant/benefiting  Avg>4h/day  Uses [facility]    Review of Systems  A 14pt ROS was reviewed & is negative unless otherwise documented in the HPI       Objective     Physical Exam  GENERAL: NAD, calm, cooperative, appropriate  Awake/alert  Well groomed  RESP: CTAB  HEART: RRR  No LE edema  MENTAL STATUS: Poor STM, he repeated himself a few times during the visit  SPEECH/LANGUAGE: clear, fluent  CN:  Perrla, eomi, vff, gaze conjugate  No tactile or motor facial asymmetry  Tongue protrudes midline  Motor: mild BLE weakness  Cerebellar: no tremor or dysmetria  Sensory: normal to tactile stim/vibration  DTRs: normal +2, symmetric  Gait: not observed, in WC       Assessment and Plan     1. Alzheimer's disease  -     Ambulatory referral/consult to Physical/Occupational Therapy; Future; Expected date: 03/27/2024  -     memantine (NAMENDA) 10 MG Tab; Take 1 tablet (10 mg total) by mouth 2 (two) times daily.  Dispense: 180 tablet; Refill: 4    2. Risk for falls  -     Ambulatory referral/consult to Physical/Occupational Therapy; Future; Expected date: 03/27/2024    3.  Leg weakness, bilateral  -     Ambulatory referral/consult to Physical/Occupational Therapy; Future; Expected date: 03/27/2024      Cont zoloft 75mg/day & namenda 10mg bid  [place] [facility] [place] for [place]; deconditioning, gait training)  Needs to increase activity (mentally & physically).  Discussed multiple ways to do that, but he does not seem interested in anything.  Advised to turn the lights on, put the TV volume up, get out of the house - do things that will stimulate his mind & keep him awake during the day.  He agreed to go to PT.  Disease progression discussed  HBOT was mentioned as well; son may research more about it.  Cont cpap; compliant/benefiting/medically necessary  F/u in June    Luna Mahmood, [facility]     Follow up for AD.    Total visit time of 59 min includes time spent preparing to see the patient (chart review), obtaining and/or reviewing separately obtained history, performing an exam, counseling/educating the patient and/or caregiver, ordering medications, tests/procedures and or therapies, documenting clinical info in the EHR and care coordination.

## 2024-03-20 ENCOUNTER — OFFICE VISIT (OUTPATIENT)
Dept: NEUROLOGY | Facility: CLINIC | Age: 79
End: 2024-03-20
Payer: MEDICARE

## 2024-03-20 VITALS
BODY MASS INDEX: 32.13 KG/M2 | HEIGHT: 68 IN | RESPIRATION RATE: 20 BRPM | SYSTOLIC BLOOD PRESSURE: 124 MMHG | HEART RATE: 59 BPM | WEIGHT: 212 LBS | DIASTOLIC BLOOD PRESSURE: 86 MMHG

## 2024-03-20 DIAGNOSIS — F02.80 ALZHEIMER'S DISEASE: Primary | ICD-10-CM

## 2024-03-20 DIAGNOSIS — Z91.81 RISK FOR FALLS: ICD-10-CM

## 2024-03-20 DIAGNOSIS — G30.9 ALZHEIMER'S DISEASE: Primary | ICD-10-CM

## 2024-03-20 DIAGNOSIS — R29.898 LEG WEAKNESS, BILATERAL: ICD-10-CM

## 2024-03-20 DIAGNOSIS — G47.33 OSA ON CPAP: ICD-10-CM

## 2024-03-20 PROCEDURE — 99215 OFFICE O/P EST HI 40 MIN: CPT | Mod: PBBFAC | Performed by: NURSE PRACTITIONER

## 2024-03-20 PROCEDURE — 99215 OFFICE O/P EST HI 40 MIN: CPT | Mod: S$PBB,,, | Performed by: NURSE PRACTITIONER

## 2024-03-20 PROCEDURE — 99999 PR PBB SHADOW E&M-EST. PATIENT-LVL V: CPT | Mod: PBBFAC,,, | Performed by: NURSE PRACTITIONER

## 2024-03-20 RX ORDER — LISINOPRIL 5 MG/1
5 TABLET ORAL
COMMUNITY
Start: 2024-02-06

## 2024-03-20 RX ORDER — MEMANTINE HYDROCHLORIDE 10 MG/1
10 TABLET ORAL 2 TIMES DAILY
Qty: 180 TABLET | Refills: 4 | Status: SHIPPED | OUTPATIENT
Start: 2024-03-20

## 2024-03-20 NOTE — PATIENT INSTRUCTIONS
"Patient Education       Dementia (Including Alzheimer Disease)   The Basics   Written by the doctors and editors at [facility]   What is dementia? -- Dementia is the general term for a group of brain disorders that cause memory problems and make it hard to think clearly (figure 1).  What symptoms does dementia cause? -- The symptoms of dementia often start off very mild and get worse slowly. Symptoms can include:  Forgetfulness  Acting confused or disoriented  Trouble with speech and writing (for example, not being able to find the right words for things)  Trouble concentrating and reasoning  Problems with tasks such as paying bills or balancing a checkbook  Getting lost in familiar places  As dementia gets worse, people might:  Have episodes of anger or aggression  See things that aren't there or believe things that aren't true  Be unable to eat, bathe, dress, or do other everyday tasks  Lose bladder and bowel control  What are the different kinds of dementia? -- The most common kinds include:  Alzheimer disease - Alzheimer disease is the most common cause of dementia. It is a disorder in which brain cells slowly die over time.  Vascular dementia - Vascular dementia happens when parts of the brain do not get enough blood. This can happen when blood vessels in the brain get blocked with blood clots or damaged by high blood pressure or aging. This form of dementia is most common among people who have had strokes or who are at risk for strokes.  Parkinson disease dementia - Parkinson disease is a brain disorder that affects movement. It causes trembling, stiffness, and slowness. As Parkinson disease gets worse, some people develop dementia. "Lewy body dementia" is a related form of dementia.   Other causes of dementia - Dementia can also happen if a person's brain has been damaged. For example, having many head injuries can lead to dementia.  Should I see a doctor or nurse? -- Yes, you should see a doctor or nurse if " you think you or someone close to you is showing signs of dementia. Sometimes memory loss and confusion are caused by medical problems other than dementia that can be treated. For example, people with diabetes sometimes show signs of confusion when their blood sugar is not well controlled.  Are there tests I should have? -- Your doctor or nurse will decide which tests you should have based on your individual situation. Many people with signs of dementia do not need a brain scan. That's because the tests that are most useful are the ones that look at how you answer questions and do certain tasks. Even so, your doctor might want to do a brain scan (either CT or MRI) to make sure that your symptoms are not caused by a problem unrelated to dementia.  How is dementia treated? -- That depends on what kind of dementia you have. If you have Alzheimer disease, there are medicines that might help some. If you have vascular dementia, your doctor will focus on keeping your blood pressure and cholesterol as close to normal as possible. Doing that can help reduce further damage to the brain.  Sadly, there really aren't good treatments for most types of dementia. But doctors can sometimes treat troubling symptoms that come with dementia, such as depression or anxiety.  How do I stay safe? -- If you have dementia, you might not be aware of how much your condition affects you. Trust your family and friends to tell you when it is no longer safe for you to drive, cook, or do other things that could be dangerous.  Be aware, too, that people with dementia often fall and hurt themselves. To reduce the risk of falls, it's a good idea to:  Secure loose rugs or use non-skid backing on rugs  Tuck away loose wires or electrical cords  Wear sturdy, comfortable shoes  Keep walkways well lit  Can dementia be prevented? -- There are no proven ways to prevent dementia. But here are some things that seem to help keep the brain healthy:  Physical  activity  A healthy diet  Social interaction  All topics are updated as new evidence becomes available and our peer review process is complete.  This topic retrieved from [employer] on: Sep 21, 2021.  Topic 53780 Version 15.0  Release: 29.4.2 - C29.263  © 2021 UpToDate, Inc. and/or its affiliates. All rights reserved.  figure 1: Dementia caused by changes in the brain     Different forms of dementia are linked to changes in the brain. Alzheimer disease causes many parts of the brain to shrink. Parkinson disease damages parts of the brain involved in movement. Vascular dementia happens when the blood vessels that supply the brain are diseased.  Graphic 25455 Version 2.0    Consumer Information Use and Disclaimer   This information is not specific medical advice and does not replace information you receive from your health care provider. This is only a brief summary of general information. It does NOT include all information about conditions, illnesses, injuries, tests, procedures, treatments, therapies, discharge instructions or life-style choices that may apply to you. You must talk with your health care provider for complete information about your health and treatment options. This information should not be used to decide whether or not to accept your health care provider's advice, instructions or recommendations. Only your health care provider has the knowledge and training to provide advice that is right for you. The use of this information is governed by the [employer] End User License Agreement, available at https://www.[employer]world/en/solutions/[employer]/about/jj.The use of [employer] content is governed by the [employer] Terms of Use. ©2021 UpToDate, Inc. All rights reserved.  Copyright   © 2021 UpToDate, Inc. and/or its affiliates. All rights reserved.

## 2024-03-26 ENCOUNTER — TELEPHONE (OUTPATIENT)
Dept: NEUROLOGY | Facility: CLINIC | Age: 79
End: 2024-03-26
Payer: MEDICARE

## 2024-03-26 NOTE — TELEPHONE ENCOUNTER
Pt's wife called stating [facility] did not receive the referral. I called the facility and they confirmed they do not have it.    Please advise.    Fax: [phone]

## 2024-04-30 ENCOUNTER — OFFICE VISIT (OUTPATIENT)
Dept: NEUROLOGY | Facility: CLINIC | Age: 79
End: 2024-04-30
Payer: MEDICARE

## 2024-04-30 VITALS
HEIGHT: 68 IN | SYSTOLIC BLOOD PRESSURE: 110 MMHG | BODY MASS INDEX: 32.13 KG/M2 | DIASTOLIC BLOOD PRESSURE: 60 MMHG | WEIGHT: 212 LBS

## 2024-04-30 DIAGNOSIS — F02.80 ALZHEIMER'S DISEASE: Primary | ICD-10-CM

## 2024-04-30 DIAGNOSIS — F91.9 BEHAVIOR DISTURBANCE: ICD-10-CM

## 2024-04-30 DIAGNOSIS — R41.3 OTHER AMNESIA: ICD-10-CM

## 2024-04-30 DIAGNOSIS — G47.33 OBSTRUCTIVE SLEEP APNEA: ICD-10-CM

## 2024-04-30 DIAGNOSIS — G30.9 ALZHEIMER'S DISEASE: Primary | ICD-10-CM

## 2024-04-30 PROCEDURE — 99999 PR PBB SHADOW E&M-EST. PATIENT-LVL IV: CPT | Mod: PBBFAC,,, | Performed by: PSYCHIATRY & NEUROLOGY

## 2024-04-30 PROCEDURE — 99214 OFFICE O/P EST MOD 30 MIN: CPT | Mod: PBBFAC | Performed by: PSYCHIATRY & NEUROLOGY

## 2024-04-30 PROCEDURE — 99215 OFFICE O/P EST HI 40 MIN: CPT | Mod: S$PBB,,, | Performed by: PSYCHIATRY & NEUROLOGY

## 2024-04-30 NOTE — PROGRESS NOTES
Subjective     Patient ID: Rojas Foster is a 79 y.o. male.    Chief Complaint: Alzheimer's disease    HPI    Who presents for follow up of advance Alzheimer's disease.  Last seen in clinic by NP about 1 month ago.  Accompanied by wife and son who assists with the history.  Over the last week, the patient has been increasingly lethargic.  Does not want to participate in activities.  Sleeping for most of the day.  States he is having some more difficulty with ambulation and generalized weakness.  He has been going to physical therapy, but has been having some difficulty due to his energy levels.  He also has had 2 episodes of urinary incontinence over the last few days.  Requesting another MRI.  Memory slowly deteriorating.  Able to feed himself, the otherwise needs assistance with arrest of his ADLs.  Remains on CPAP.  Benefits from continued use.    Review of Systems  A 14pt ROS was reviewed & is negative unless otherwise documented in the HPI       Objective     Physical Exam  GENERAL: NAD, calm, cooperative, appropriate  Awake/alert  Well groomed  RESP: Normal work of breathing  No LE edema  MENTAL STATUS:  Alert.  Poor short-term memory  SPEECH/LANGUAGE: clear, fluent  CN:  Perrla, eomi, vff, gaze conjugate  No tactile or motor facial asymmetry  Tongue protrudes midline  Motor:  Mild bilateral lower extremity weakness  Cerebellar: no tremor or dysmetria  Sensory: normal to tactile stim/vibration  DTRs: normal +2, symmetric  Gait: not observed, able to stand, but unsteady on his feet so gait testing deferred       Assessment and Plan     1. Alzheimer's disease  -     Urinalysis, Reflex to Urine Culture  -     CPAP/BIPAP SUPPLIES  -     Urinalysis  -     Urinalysis; Future; Expected date: 04/30/2024    2. Other amnesia  -     MRI Brain Without Contrast; Future; Expected date: 04/30/2024  -     CPAP/BIPAP SUPPLIES  -     Urinalysis  -     Urinalysis; Future; Expected date: 04/30/2024    3. Obstructive sleep  apnea  -     CPAP/BIPAP SUPPLIES  -     Urinalysis  -     Urinalysis; Future; Expected date: 04/30/2024      Continue memantine 10 mg bid  Will obtain urinalysis; evaluate for UTI  Repeat MRI of brain without contrast  Continue physical therapy  Educated family on natural progression of Alzheimer's disease  Needs CPAP supplies.  Prescription sent.  Patient continues to benefit from CPAP machine for his apnea       Follow up in about 4 weeks (around 5/28/2024).

## 2024-05-01 ENCOUNTER — LAB REQUISITION (OUTPATIENT)
Dept: LAB | Facility: HOSPITAL | Age: 79
End: 2024-05-01
Payer: MEDICARE

## 2024-05-01 DIAGNOSIS — F02.80 DEMENTIA IN OTHER DISEASES CLASSIFIED ELSEWHERE, UNSPECIFIED SEVERITY, WITHOUT BEHAVIORAL DISTURBANCE, PSYCHOTIC DISTURBANCE, MOOD DISTURBANCE, AND ANXIETY: ICD-10-CM

## 2024-05-01 DIAGNOSIS — F91.9 CONDUCT DISORDER, UNSPECIFIED: ICD-10-CM

## 2024-05-01 DIAGNOSIS — G30.9 ALZHEIMER'S DISEASE, UNSPECIFIED (CODE): ICD-10-CM

## 2024-05-01 LAB
APPEARANCE UR: CLEAR
BACTERIA #/AREA URNS AUTO: ABNORMAL /HPF
BILIRUB UR QL STRIP.AUTO: NEGATIVE
COLOR UR AUTO: YELLOW
GLUCOSE UR QL STRIP.AUTO: NORMAL
KETONES UR QL STRIP.AUTO: NEGATIVE
LEUKOCYTE ESTERASE UR QL STRIP.AUTO: NEGATIVE
MUCOUS THREADS URNS QL MICRO: ABNORMAL /LPF
NITRITE UR QL STRIP.AUTO: NEGATIVE
PH UR STRIP.AUTO: 5.5 [PH]
PROT UR QL STRIP.AUTO: NEGATIVE
RBC #/AREA URNS AUTO: ABNORMAL /HPF
RBC UR QL AUTO: NEGATIVE
SP GR UR STRIP.AUTO: 1.02 (ref 1–1.03)
SQUAMOUS #/AREA URNS LPF: ABNORMAL /HPF
UROBILINOGEN UR STRIP-ACNC: NORMAL
WBC #/AREA URNS AUTO: ABNORMAL /HPF

## 2024-05-01 PROCEDURE — 81001 URINALYSIS AUTO W/SCOPE: CPT | Performed by: PSYCHIATRY & NEUROLOGY

## 2024-05-16 ENCOUNTER — HOSPITAL ENCOUNTER (OUTPATIENT)
Dept: RADIOLOGY | Facility: HOSPITAL | Age: 79
Discharge: HOME | End: 2024-05-16
Attending: PSYCHIATRY & NEUROLOGY
Payer: MEDICARE

## 2024-05-16 DIAGNOSIS — R41.3 OTHER AMNESIA: ICD-10-CM

## 2024-05-16 PROCEDURE — 70551 MRI BRAIN STEM W/O DYE: CPT | Mod: TC

## 2024-07-01 ENCOUNTER — OFFICE VISIT (OUTPATIENT)
Dept: NEUROLOGY | Facility: CLINIC | Age: 79
End: 2024-07-01
Payer: MEDICARE

## 2024-07-01 VITALS
DIASTOLIC BLOOD PRESSURE: 62 MMHG | HEIGHT: 68 IN | SYSTOLIC BLOOD PRESSURE: 110 MMHG | BODY MASS INDEX: 32.13 KG/M2 | WEIGHT: 212 LBS

## 2024-07-01 DIAGNOSIS — G95.20 UNSPECIFIED CORD COMPRESSION: Primary | ICD-10-CM

## 2024-07-01 DIAGNOSIS — F02.84 DEMENTIA IN OTHER DISEASES CLASSIFIED ELSEWHERE, UNSPECIFIED SEVERITY, WITH ANXIETY: ICD-10-CM

## 2024-07-01 PROCEDURE — 99999 PR PBB SHADOW E&M-EST. PATIENT-LVL IV: CPT | Mod: PBBFAC,,, | Performed by: PSYCHIATRY & NEUROLOGY

## 2024-07-01 PROCEDURE — 99214 OFFICE O/P EST MOD 30 MIN: CPT | Mod: PBBFAC | Performed by: PSYCHIATRY & NEUROLOGY

## 2024-07-01 PROCEDURE — 99214 OFFICE O/P EST MOD 30 MIN: CPT | Mod: S$PBB,,, | Performed by: PSYCHIATRY & NEUROLOGY

## 2024-07-01 NOTE — PROGRESS NOTES
Subjective     Patient ID: Rojas Foster is a 79 y.o. male.    Chief Complaint: Alzheimer's disease    HPI    Who presents for follow up of advance Alzheimer's disease.  Accompanied by wife and son who assists with the history.      Endorsing somewhat improvement in lethargy, but continues to progress with shuffling gait and avoidance of mobility. Having increased anxiety at night, stating he begins feeling scared, but unable to state of what particularly.  Does not want to participate in activities.  Completed physical therapy Thursday last week. No improvement.    Denies urinary incontinence.  Repeat MRI 5/2024 reviewed in comparison to 10/2023 MRI, minimal changes noted.  Able to feed himself, the otherwise needs assistance with arrest of his ADLs.  Remains on CPAP.  Sleeping 8-9 hours with CPAP, but continues to give wife difficulty when applying mask prior to sleep.    Review of Systems  A 14pt ROS was reviewed & is negative unless otherwise documented in the HPI       Objective     Physical Exam  GENERAL: NAD, calm, cooperative, appropriate  Awake/alert  Well groomed  RESP: Normal work of breathing  No LE edema  MENTAL STATUS:  Alert.  Poor short-term memory  SPEECH/LANGUAGE: clear, fluent  CN:  Perrla, eomi, vff, gaze conjugate  No tactile or motor facial asymmetry  Tongue protrudes midline  Motor:  Mild bilateral lower extremity weakness  Cerebellar: no tremor or dysmetria  Sensory: normal to tactile stim/vibration  DTRs: normal +2, symmetric  Gait: not observed, able to stand, but unsteady on his feet so gait testing deferred       Assessment and Plan     1. Unspecified cord compression    2. 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 Patient continues to benefit from CPAP machine for his apnea  May be showing some early signs of sundowning, will continue to monitor on follow up       No follow-ups on file.      RTC in 6 months      Deven Mcneill MD  Internal Medicine - PGY-3  [facility]

## 2024-07-02 ENCOUNTER — HOSPITAL ENCOUNTER (INPATIENT)
Facility: HOSPITAL | Age: 79
LOS: 7 days | Discharge: NURSING, REHAB OR LONG-TERM CARE | DRG: 689 | End: 2024-07-09
Attending: EMERGENCY MEDICINE | Admitting: INTERNAL MEDICINE
Payer: MEDICARE

## 2024-07-02 DIAGNOSIS — R50.9 FEVER, UNSPECIFIED FEVER CAUSE: ICD-10-CM

## 2024-07-02 DIAGNOSIS — R09.02 HYPOXIA: ICD-10-CM

## 2024-07-02 DIAGNOSIS — J98.4 PNEUMONITIS: Primary | ICD-10-CM

## 2024-07-02 DIAGNOSIS — Z20.822 CLOSE EXPOSURE TO COVID-19 VIRUS: ICD-10-CM

## 2024-07-02 DIAGNOSIS — R41.82 ALTERED MENTAL STATUS, UNSPECIFIED ALTERED MENTAL STATUS TYPE: ICD-10-CM

## 2024-07-02 DIAGNOSIS — R00.1 BRADYCARDIA: ICD-10-CM

## 2024-07-02 DIAGNOSIS — F02.80 ALZHEIMER'S DEMENTIA, UNSPECIFIED DEMENTIA SEVERITY, UNSPECIFIED TIMING OF DEMENTIA ONSET, UNSPECIFIED WHETHER BEHAVIORAL, PSYCHOTIC, OR MOOD DISTURBANCE OR ANXIETY: ICD-10-CM

## 2024-07-02 DIAGNOSIS — R05.9 COUGH: ICD-10-CM

## 2024-07-02 DIAGNOSIS — R41.82 ALTERED MENTAL STATUS: ICD-10-CM

## 2024-07-02 DIAGNOSIS — G30.9 ALZHEIMER'S DEMENTIA, UNSPECIFIED DEMENTIA SEVERITY, UNSPECIFIED TIMING OF DEMENTIA ONSET, UNSPECIFIED WHETHER BEHAVIORAL, PSYCHOTIC, OR MOOD DISTURBANCE OR ANXIETY: ICD-10-CM

## 2024-07-02 LAB
ALBUMIN SERPL-MCNC: 4.1 G/DL (ref 3.4–4.8)
ALBUMIN/GLOB SERPL: 1.1 RATIO (ref 1.1–2)
ALP SERPL-CCNC: 95 UNIT/L (ref 40–150)
ALT SERPL-CCNC: 11 UNIT/L (ref 0–55)
ANION GAP SERPL CALC-SCNC: 10 MEQ/L
AST SERPL-CCNC: 18 UNIT/L (ref 5–34)
B PERT.PT PRMT NPH QL NAA+NON-PROBE: NOT DETECTED
BACTERIA #/AREA URNS AUTO: ABNORMAL /HPF
BASOPHILS # BLD AUTO: 0.04 X10(3)/MCL
BASOPHILS NFR BLD AUTO: 0.4 %
BILIRUB SERPL-MCNC: 0.6 MG/DL
BILIRUB UR QL STRIP.AUTO: NEGATIVE
BUN SERPL-MCNC: 20.2 MG/DL (ref 8.4–25.7)
C PNEUM DNA NPH QL NAA+NON-PROBE: NOT DETECTED
CALCIUM SERPL-MCNC: 9.6 MG/DL (ref 8.8–10)
CHLORIDE SERPL-SCNC: 106 MMOL/L (ref 98–107)
CLARITY UR: CLEAR
CO2 SERPL-SCNC: 22 MMOL/L (ref 23–31)
COLOR UR AUTO: ABNORMAL
CREAT SERPL-MCNC: 1.3 MG/DL (ref 0.73–1.18)
CREAT/UREA NIT SERPL: 16
EOSINOPHIL # BLD AUTO: 0.26 X10(3)/MCL (ref 0–0.9)
EOSINOPHIL NFR BLD AUTO: 2.3 %
ERYTHROCYTE [DISTWIDTH] IN BLOOD BY AUTOMATED COUNT: 14.9 % (ref 11.5–17)
FLUAV AG UPPER RESP QL IA.RAPID: NOT DETECTED
FLUBV AG UPPER RESP QL IA.RAPID: NOT DETECTED
GFR SERPLBLD CREATININE-BSD FMLA CKD-EPI: 56 ML/MIN/1.73/M2
GLOBULIN SER-MCNC: 3.7 GM/DL (ref 2.4–3.5)
GLUCOSE SERPL-MCNC: 100 MG/DL (ref 82–115)
GLUCOSE UR QL STRIP: NORMAL
HADV DNA NPH QL NAA+NON-PROBE: NOT DETECTED
HCOV 229E RNA NPH QL NAA+NON-PROBE: NOT DETECTED
HCOV HKU1 RNA NPH QL NAA+NON-PROBE: NOT DETECTED
HCOV NL63 RNA NPH QL NAA+NON-PROBE: NOT DETECTED
HCOV OC43 RNA NPH QL NAA+NON-PROBE: NOT DETECTED
HCT VFR BLD AUTO: 37 % (ref 42–52)
HGB BLD-MCNC: 12.1 G/DL (ref 14–18)
HGB UR QL STRIP: NEGATIVE
HMPV RNA NPH QL NAA+NON-PROBE: NOT DETECTED
HPIV1 RNA NPH QL NAA+NON-PROBE: NOT DETECTED
HPIV2 RNA NPH QL NAA+NON-PROBE: NOT DETECTED
HPIV3 RNA NPH QL NAA+NON-PROBE: NOT DETECTED
HPIV4 RNA NPH QL NAA+NON-PROBE: NOT DETECTED
IMM GRANULOCYTES # BLD AUTO: 0.06 X10(3)/MCL (ref 0–0.04)
IMM GRANULOCYTES NFR BLD AUTO: 0.5 %
KETONES UR QL STRIP: NEGATIVE
LACTATE SERPL-SCNC: 1.5 MMOL/L (ref 0.5–2.2)
LEUKOCYTE ESTERASE UR QL STRIP: 250
LYMPHOCYTES # BLD AUTO: 0.88 X10(3)/MCL (ref 0.6–4.6)
LYMPHOCYTES NFR BLD AUTO: 7.9 %
M PNEUMO DNA NPH QL NAA+NON-PROBE: NOT DETECTED
MAGNESIUM SERPL-MCNC: 1.9 MG/DL (ref 1.6–2.6)
MCH RBC QN AUTO: 31.1 PG (ref 27–31)
MCHC RBC AUTO-ENTMCNC: 32.7 G/DL (ref 33–36)
MCV RBC AUTO: 95.1 FL (ref 80–94)
MONOCYTES # BLD AUTO: 0.68 X10(3)/MCL (ref 0.1–1.3)
MONOCYTES NFR BLD AUTO: 6.1 %
MUCOUS THREADS URNS QL MICRO: ABNORMAL /LPF
NEUTROPHILS # BLD AUTO: 9.15 X10(3)/MCL (ref 2.1–9.2)
NEUTROPHILS NFR BLD AUTO: 82.8 %
NITRITE UR QL STRIP: ABNORMAL
NRBC BLD AUTO-RTO: 0 %
OHS QRS DURATION: 82 MS
OHS QTC CALCULATION: 434 MS
PH UR STRIP: 5.5 [PH]
PLATELET # BLD AUTO: 192 X10(3)/MCL (ref 130–400)
PLATELETS.RETICULATED NFR BLD AUTO: 3.7 % (ref 0.9–11.2)
PMV BLD AUTO: 10.5 FL (ref 7.4–10.4)
POCT GLUCOSE: 95 MG/DL (ref 70–110)
POTASSIUM SERPL-SCNC: 4.3 MMOL/L (ref 3.5–5.1)
PROT SERPL-MCNC: 7.8 GM/DL (ref 5.8–7.6)
PROT UR QL STRIP: NEGATIVE
RBC # BLD AUTO: 3.89 X10(6)/MCL (ref 4.7–6.1)
RBC #/AREA URNS AUTO: ABNORMAL /HPF
RSV RNA NPH QL NAA+NON-PROBE: NOT DETECTED
RV+EV RNA NPH QL NAA+NON-PROBE: NOT DETECTED
SARS-COV-2 RNA RESP QL NAA+PROBE: NOT DETECTED
SODIUM SERPL-SCNC: 138 MMOL/L (ref 136–145)
SP GR UR STRIP.AUTO: 1.01 (ref 1–1.03)
SQUAMOUS #/AREA URNS LPF: ABNORMAL /HPF
TROPONIN I SERPL-MCNC: <0.01 NG/ML (ref 0–0.04)
UROBILINOGEN UR STRIP-ACNC: NORMAL
WBC # BLD AUTO: 11.07 X10(3)/MCL (ref 4.5–11.5)
WBC #/AREA URNS AUTO: ABNORMAL /HPF

## 2024-07-02 PROCEDURE — 96375 TX/PRO/DX INJ NEW DRUG ADDON: CPT

## 2024-07-02 PROCEDURE — 82962 GLUCOSE BLOOD TEST: CPT

## 2024-07-02 PROCEDURE — 99285 EMERGENCY DEPT VISIT HI MDM: CPT | Mod: 25

## 2024-07-02 PROCEDURE — 93010 ELECTROCARDIOGRAM REPORT: CPT | Mod: ,,, | Performed by: INTERNAL MEDICINE

## 2024-07-02 PROCEDURE — 96365 THER/PROPH/DIAG IV INF INIT: CPT

## 2024-07-02 PROCEDURE — 87798 DETECT AGENT NOS DNA AMP: CPT | Performed by: EMERGENCY MEDICINE

## 2024-07-02 PROCEDURE — 96361 HYDRATE IV INFUSION ADD-ON: CPT

## 2024-07-02 PROCEDURE — 21400001 HC TELEMETRY ROOM

## 2024-07-02 PROCEDURE — 63600175 PHARM REV CODE 636 W HCPCS: Performed by: EMERGENCY MEDICINE

## 2024-07-02 PROCEDURE — 25500020 PHARM REV CODE 255: Performed by: EMERGENCY MEDICINE

## 2024-07-02 PROCEDURE — 87086 URINE CULTURE/COLONY COUNT: CPT | Performed by: EMERGENCY MEDICINE

## 2024-07-02 PROCEDURE — 25000003 PHARM REV CODE 250: Performed by: EMERGENCY MEDICINE

## 2024-07-02 PROCEDURE — 93005 ELECTROCARDIOGRAM TRACING: CPT

## 2024-07-02 PROCEDURE — 83605 ASSAY OF LACTIC ACID: CPT | Performed by: EMERGENCY MEDICINE

## 2024-07-02 PROCEDURE — 85025 COMPLETE CBC W/AUTO DIFF WBC: CPT | Performed by: EMERGENCY MEDICINE

## 2024-07-02 PROCEDURE — 11000001 HC ACUTE MED/SURG PRIVATE ROOM

## 2024-07-02 PROCEDURE — 81001 URINALYSIS AUTO W/SCOPE: CPT | Performed by: EMERGENCY MEDICINE

## 2024-07-02 PROCEDURE — 25000003 PHARM REV CODE 250: Performed by: INTERNAL MEDICINE

## 2024-07-02 PROCEDURE — 27000221 HC OXYGEN, UP TO 24 HOURS

## 2024-07-02 PROCEDURE — 80053 COMPREHEN METABOLIC PANEL: CPT | Performed by: EMERGENCY MEDICINE

## 2024-07-02 PROCEDURE — 84484 ASSAY OF TROPONIN QUANT: CPT | Performed by: EMERGENCY MEDICINE

## 2024-07-02 PROCEDURE — 83735 ASSAY OF MAGNESIUM: CPT | Performed by: EMERGENCY MEDICINE

## 2024-07-02 PROCEDURE — 87040 BLOOD CULTURE FOR BACTERIA: CPT | Performed by: EMERGENCY MEDICINE

## 2024-07-02 PROCEDURE — 0240U COVID/FLU A&B PCR: CPT | Performed by: EMERGENCY MEDICINE

## 2024-07-02 RX ORDER — SODIUM CHLORIDE, SODIUM LACTATE, POTASSIUM CHLORIDE, CALCIUM CHLORIDE 600; 310; 30; 20 MG/100ML; MG/100ML; MG/100ML; MG/100ML
INJECTION, SOLUTION INTRAVENOUS CONTINUOUS
Status: DISCONTINUED | OUTPATIENT
Start: 2024-07-02 | End: 2024-07-09 | Stop reason: HOSPADM

## 2024-07-02 RX ORDER — ACETAMINOPHEN 10 MG/ML
1000 INJECTION, SOLUTION INTRAVENOUS ONCE
Status: COMPLETED | OUTPATIENT
Start: 2024-07-02 | End: 2024-07-02

## 2024-07-02 RX ORDER — PROCHLORPERAZINE EDISYLATE 5 MG/ML
5 INJECTION INTRAMUSCULAR; INTRAVENOUS EVERY 6 HOURS PRN
Status: DISCONTINUED | OUTPATIENT
Start: 2024-07-02 | End: 2024-07-09 | Stop reason: HOSPADM

## 2024-07-02 RX ORDER — ONDANSETRON HYDROCHLORIDE 2 MG/ML
4 INJECTION, SOLUTION INTRAVENOUS EVERY 8 HOURS PRN
Status: DISCONTINUED | OUTPATIENT
Start: 2024-07-02 | End: 2024-07-09 | Stop reason: HOSPADM

## 2024-07-02 RX ORDER — TALC
6 POWDER (GRAM) TOPICAL NIGHTLY PRN
Status: DISCONTINUED | OUTPATIENT
Start: 2024-07-02 | End: 2024-07-09 | Stop reason: HOSPADM

## 2024-07-02 RX ORDER — MORPHINE SULFATE 4 MG/ML
4 INJECTION, SOLUTION INTRAMUSCULAR; INTRAVENOUS EVERY 4 HOURS PRN
Status: DISCONTINUED | OUTPATIENT
Start: 2024-07-02 | End: 2024-07-09 | Stop reason: HOSPADM

## 2024-07-02 RX ORDER — KETOROLAC TROMETHAMINE 30 MG/ML
15 INJECTION, SOLUTION INTRAMUSCULAR; INTRAVENOUS
Status: ACTIVE | OUTPATIENT
Start: 2024-07-02 | End: 2024-07-03

## 2024-07-02 RX ORDER — ACETAMINOPHEN 325 MG/1
650 TABLET ORAL EVERY 8 HOURS PRN
Status: DISCONTINUED | OUTPATIENT
Start: 2024-07-02 | End: 2024-07-09 | Stop reason: HOSPADM

## 2024-07-02 RX ORDER — ACETAMINOPHEN 650 MG/1
650 SUPPOSITORY RECTAL EVERY 4 HOURS PRN
Status: DISCONTINUED | OUTPATIENT
Start: 2024-07-02 | End: 2024-07-09 | Stop reason: HOSPADM

## 2024-07-02 RX ORDER — KETOROLAC TROMETHAMINE 30 MG/ML
15 INJECTION, SOLUTION INTRAMUSCULAR; INTRAVENOUS
Status: COMPLETED | OUTPATIENT
Start: 2024-07-02 | End: 2024-07-02

## 2024-07-02 RX ORDER — MORPHINE SULFATE 4 MG/ML
2 INJECTION, SOLUTION INTRAMUSCULAR; INTRAVENOUS EVERY 4 HOURS PRN
Status: DISCONTINUED | OUTPATIENT
Start: 2024-07-02 | End: 2024-07-09 | Stop reason: HOSPADM

## 2024-07-02 RX ORDER — FAMOTIDINE 10 MG/ML
20 INJECTION INTRAVENOUS EVERY 12 HOURS
Status: DISCONTINUED | OUTPATIENT
Start: 2024-07-02 | End: 2024-07-09 | Stop reason: HOSPADM

## 2024-07-02 RX ADMIN — ACETAMINOPHEN 650 MG: 650 SUPPOSITORY RECTAL at 10:07

## 2024-07-02 RX ADMIN — IOHEXOL 100 ML: 350 INJECTION, SOLUTION INTRAVENOUS at 06:07

## 2024-07-02 RX ADMIN — PIPERACILLIN AND TAZOBACTAM 4.5 G: 4; .5 INJECTION, POWDER, LYOPHILIZED, FOR SOLUTION INTRAVENOUS; PARENTERAL at 06:07

## 2024-07-02 RX ADMIN — SODIUM CHLORIDE 1000 ML: 9 INJECTION, SOLUTION INTRAVENOUS at 03:07

## 2024-07-02 RX ADMIN — FAMOTIDINE 20 MG: 10 INJECTION, SOLUTION INTRAVENOUS at 10:07

## 2024-07-02 RX ADMIN — SODIUM CHLORIDE 1000 ML: 9 INJECTION, SOLUTION INTRAVENOUS at 04:07

## 2024-07-02 RX ADMIN — SODIUM CHLORIDE, POTASSIUM CHLORIDE, SODIUM LACTATE AND CALCIUM CHLORIDE: 600; 310; 30; 20 INJECTION, SOLUTION INTRAVENOUS at 08:07

## 2024-07-02 RX ADMIN — ACETAMINOPHEN 1000 MG: 10 INJECTION, SOLUTION INTRAVENOUS at 03:07

## 2024-07-02 RX ADMIN — KETOROLAC TROMETHAMINE 15 MG: 30 INJECTION, SOLUTION INTRAMUSCULAR at 05:07

## 2024-07-02 NOTE — ED PROVIDER NOTES
"Encounter Date: 7/2/2024    SCRIBE #1 NOTE: I, Gerber Velasquezreth, am scribing for, and in the presence of,  Luis Manuel Bernal MD. I have scribed the following portions of the note - the EKG reading. Other sections scribed: HPI, ROS, PE.   SCRIBE #2 NOTE: I, Antoinette Flip, am scribing for, and in the presence of,  Luis Manuel Bernal MD. I have scribed the remaining portions of the note not scribed by Scribe #1.     History     Chief Complaint   Patient presents with    Altered Mental Status     Hx of Alzheimer's. Pt to ED with complaints of increased confusion per wife since 0400. Baseline GCS 14, GCS 12 at this time. Unable to complete FAST exam D/T pt not following commands. No slurred speech noted. Pt not answering questions, just responding to voice by saying, "yeh."     The patient is a 79 year old male with a hx of TIA, HTN, and Alzheimer's presents to the ED with an altered mental status. The patient's wife stated that he was last his normal self at 04:30 this morning. The patient had his coffee at 07:30 and then went to the bathroom afterward. The wife reports he was standing in the bathroom for 10-15 minutes which was unusual for him. He sat in his chair for a nap and could not get up afterward. Wife reports coughing today and was febrile on arrival to ED.  The patient's son currently has Covid and was around the patient yesterday. PCP is Dr. Correa and neurologist is Dr. Avery.     The history is provided by the spouse. The history is limited by the condition of the patient. No  was used.     Review of patient's allergies indicates:   Allergen Reactions    Codeine Other (See Comments)     Other reaction(s): Confusion     Past Medical History:   Diagnosis Date    Alzheimer's disease, unspecified (CODE)     Anxiety disorder, unspecified     Arthritis     HTN (hypertension)      Past Surgical History:   Procedure Laterality Date    BACK SURGERY      SHOULDER SURGERY      SHOULDER SURGERY " Left      Family History   Problem Relation Name Age of Onset    Heart disease Mother      Hypertension Mother      Hypertension Father       Social History     Tobacco Use    Smoking status: Never    Smokeless tobacco: Current     Types: Chew   Substance Use Topics    Alcohol use: Not Currently    Drug use: Never     Review of Systems   Unable to perform ROS: Mental status change       Physical Exam     Initial Vitals   BP Pulse Resp Temp SpO2   07/02/24 1419 07/02/24 1419 07/02/24 1421 07/02/24 1419 07/02/24 1419   (!) 158/105 110 16 (!) 101.1 °F (38.4 °C) (!) 92 %      MAP       --                Physical Exam    Nursing note and vitals reviewed.  Constitutional: He appears well-developed and well-nourished. No distress.   Smells strongly of urine    HENT:   Head: Normocephalic and atraumatic.   Eyes: Conjunctivae are normal. Pupils are equal, round, and reactive to light.   Cardiovascular:  Normal rate.           Pulmonary/Chest: No respiratory distress. He has no wheezes. He has no rhonchi.   Abdominal: Abdomen is soft. There is no abdominal tenderness. There is no rebound and no guarding.   Musculoskeletal:         General: Normal range of motion.     Neurological:   Generally weak, speaking in short sentences, mild tremor to the bilateral feet, no facial droop, no focal deficits    Skin: Skin is warm and dry.         ED Course   Procedures  Labs Reviewed   COMPREHENSIVE METABOLIC PANEL - Abnormal; Notable for the following components:       Result Value    CO2 22 (*)     Creatinine 1.30 (*)     Protein Total 7.8 (*)     Globulin 3.7 (*)     All other components within normal limits   URINALYSIS, REFLEX TO URINE CULTURE - Abnormal; Notable for the following components:    Nitrites, UA 2+ (*)     Leukocyte Esterase,  (*)     WBC, UA 11-20 (*)     Mucous, UA Trace (*)     All other components within normal limits   CBC WITH DIFFERENTIAL - Abnormal; Notable for the following components:    RBC 3.89 (*)      Hgb 12.1 (*)     Hct 37.0 (*)     MCV 95.1 (*)     MCH 31.1 (*)     MCHC 32.7 (*)     MPV 10.5 (*)     IG# 0.06 (*)     All other components within normal limits   COVID/FLU A&B PCR - Normal    Narrative:     The Xpert Xpress SARS-CoV-2/FLU/RSV plus is a rapid, multiplexed real-time PCR test intended for the simultaneous qualitative detection and differentiation of SARS-CoV-2, Influenza A, Influenza B, and respiratory syncytial virus (RSV) viral RNA in either nasopharyngeal swab or nasal swab specimens.         LACTIC ACID, PLASMA - Normal   TROPONIN I - Normal   MAGNESIUM - Normal   BLOOD CULTURE OLG   BLOOD CULTURE OLG   CULTURE, URINE   CBC W/ AUTO DIFFERENTIAL    Narrative:     The following orders were created for panel order CBC auto differential.  Procedure                               Abnormality         Status                     ---------                               -----------         ------                     CBC with Differential[[id number]]       Abnormal            Final result                 Please view results for these tests on the individual orders.   RESPIRATORY PANEL   POCT GLUCOSE     EKG Readings: (Independently Interpreted)   Initial Reading: No STEMI. Rhythm: Sinus Tachycardia. Heart Rate: 108. Ectopy: No Ectopy. Conduction: Normal. ST Segments: Normal ST Segments. T Waves: Normal. Axis: Normal. Clinical Impression: Sinus Tachycardia   Done at 14:18     ECG Results              EKG 12-lead (Final result)        Collection Time Result Time QRS Duration OHS QTC Calculation    07/02/24 14:18:12 07/02/24 16:17:56 82 434                     Final result by Interface, Lab In [facility] (07/02/24 16:17:59)                   Narrative:    Test Reason : R41.82,    Vent. Rate : 108 BPM     Atrial Rate : 108 BPM     P-R Int : 114 ms          QRS Dur : 082 ms      QT Int : 324 ms       P-R-T Axes : 039 043 047 degrees     QTc Int : 434 ms    Sinus tachycardia  Otherwise normal ECG  When compared with  ECG of 22-AUG-2022 14:35,  Vent. rate has increased BY  57 BPM  Nonspecific T wave abnormality now evident in Lateral leads  Confirmed by Dejuan Kaiser MD ([id number]) on 7/2/2024 4:17:52 PM    Referred By:             Confirmed By:Dejuan Kaiser MD                                  Imaging Results              CT Chest Abdomen Pelvis With IV Contrast (XPD) NO Oral Contrast (Final result)  Result time 07/02/24 18:56:27      Final result by Nilda Baker MD (07/02/24 18:56:27)                   Impression:      1. Possible mild bladder wall thickening versus artifact related to under distension.  Correlate with urinalysis to assess for cystitis.      Electronically signed by: Nilda Baker  Date:    07/02/2024  Time:    18:56               Narrative:    EXAMINATION:  CT CHEST ABDOMEN PELVIS WITH IV CONTRAST (XPD)    CLINICAL HISTORY:  Sepsis;    TECHNIQUE:  Helical acquisition with axial, sagittal and coronal reformations obtained from the thoracic inlet to the pubic symphysis following the IV administration of contrast.    Automated tube current modulation, weight-based exposure dosing, and/or iterative reconstruction technique utilized to reach lowest reasonably achievable exposure rate.    DLP: 2286 mGy*cm    COMPARISON:  CT abdomen pelvis 08/02/2021    FINDINGS:  LIMITATIONS: Mild motion degraded exam.    BASE OF NECK: No significant abnormality.    HEART: Normal size. No effusion. There are coronary artery calcifications.    THORACIC VASCULATURE: Aortic atherosclerosis. .Pulmonary arteries enhance normally.    GENNY/MEDIASTINUM: No enlarged lymph nodes by size criteria.    AIRWAYS: Motion degraded evaluation.  Expiratory phase imaging with buckling of the posterior wall of the trachea.    LUNGS/PLEURA: Motion degraded evaluation.  Mild atelectasis.  No lobar consolidation seen.  Limited evaluation for pulmonary nodule.    THORACIC SOFT TISSUES: Unremarkable.    LIVER: Normal attenuation. No appreciable focal  hepatic lesion.    BILIARY: No calcified gallstones.    PANCREAS: No inflammatory change.    SPLEEN: Normal in size    ADRENALS: No mass.    KIDNEYS/URETERS: Bilateral renal cortical cysts.  Slightly hyperdense exophytic cyst at the anterior upper pole right kidney, likely Bosniak 2 cyst.  No hydronephrosis.    GI TRACT/MESENTERY:  No evidence of bowel obstruction or inflammation. The appendix is normal.    PERITONEUM: No free fluid.No free air.    LYMPH NODES: No enlarged lymph nodes by size criteria.    ABDOMINOPELVIC VASCULATURE: Aortoiliac atherosclerosis.    BLADDER: Nondistended bladder limits CT evaluation.  Possible mild bladder wall thickening.    REPRODUCTIVE ORGANS: Normal as visualized.    ABDOMINAL WALL: Unremarkable.    BONES: The bones are demineralized.  There are postsurgical changes left shoulder arthroplasty and lumbar vertebral cement augmentation.                                       CT Head Without Contrast (Final result)  Result time 07/02/24 16:13:24      Final result by Nilda Baker MD (07/02/24 16:13:24)                   Impression:      No appreciable acute intracranial abnormality.      Electronically signed by: Nilda Baker  Date:    07/02/2024  Time:    16:13               Narrative:    EXAMINATION:  CT HEAD WITHOUT CONTRAST    CLINICAL HISTORY:  gen weak;    TECHNIQUE:  Low dose axial CT images obtained throughout the head without intravenous contrast.  Axial, sagittal and coronal reconstructions were performed and interpreted.    DLP: 1226 mGycm    All CT scans at this location are performed using dose optimization techniques as appropriate to a performed exam including the following automated exposure control, adjustment of the mA and/or kV according to patient size and/or use of iterative reconstruction technique    COMPARISON:  Brain 05/16/2024    FINDINGS:  BRAIN: Gray white differentiation is maintained. Periventricular and subcortical white matter changes most  compatible with chronic small vessel ischemic disease.  Moderate to severe cerebral atrophy.  No hemorrhage. No edema. No mass effect or midline shift.  The posterior fossa and midline structures are unremarkable.    VENTRICLES: Ex vacuo dilatation of the ventricles.    EXTRA-AXIAL: No abnormal extra-axial collections.    BONES: Calvarium is intact.    SINUSES AND MASTOIDS: Visualized paranasal sinuses and mastoid air cells are clear.                                       X-Ray Chest AP Portable (Final result)  Result time 07/02/24 15:08:57      Final result by Yaya Bonds MD (07/02/24 15:08:57)                   Impression:      No acute chest disease is identified..    Elevation of the left hemidiaphragm      Electronically signed by: Yaya Bonds  Date:    07/02/2024  Time:    15:08               Narrative:    EXAMINATION:  XR CHEST AP PORTABLE    CLINICAL HISTORY:  , Cough, unspecified.    FINDINGS:  No alveolar consolidation, effusion, or pneumothorax is seen.   The thoracic aorta is normal  cardiac silhouette, central pulmonary vessels and mediastinum are normal in size and are grossly unremarkable.   visualized osseous structures are grossly unremarkable..    There is elevation of the left hemidiaphragm                                       Medications   sodium chloride 0.9% bolus 1,000 mL 1,000 mL (0 mLs Intravenous Stopped 7/2/24 1606)   acetaminophen 1,000 mg/100 mL (10 mg/mL) injection 1,000 mg (0 mg Intravenous Stopped 7/2/24 1522)   ketorolac injection 15 mg (15 mg Intravenous Given 7/2/24 1724)   sodium chloride 0.9% bolus 1,000 mL 1,000 mL (0 mLs Intravenous Stopped 7/2/24 1745)   piperacillin-tazobactam (ZOSYN) 4.5 g in D5W 100 mL IVPB (MB+) (0 g Intravenous Stopped 7/2/24 1839)   iohexoL (OMNIPAQUE 350) injection 100 mL (100 mLs Intravenous Given 7/2/24 1848)     Medical Decision Making  The differential diagnosis includes, but is not limited to, covid-19, viral syndrome, UTI, CVA,  dehydration, dementia, electrolyte abnormality     Patient with Alzheimer's dementia presents emergency department with fever and altered mental status weaker and more lethargic than usual per wife.  States he has been going to and from the bathroom multiple times today in his urine smells stronger than usual.  On exam patient has urinated on himself and has strong smelling urine.  Urinalysis collected it was dark cloudy urine suspect urinary tract infection started on Zosyn empirically giving IV fluids.  Patient is also been coughing and his son was with him yesterday and was diagnosed with COVID-19 this morning that has also a consideration that was swab was negative here.  Patient will likely need a repeat swab tomorrow    Problems Addressed:  Close exposure to COVID-19 virus: acute illness or injury  Cough: acute illness or injury  Fever, unspecified fever cause: acute illness or injury    Amount and/or Complexity of Data Reviewed  Independent Historian: spouse     Details: Per wife, the patient is a 79 year old male with a hx of TIA, HTN, and Alzheimer's who presents to the ED with an altered mental status. The patient's wife stated that he was last his normal self at 04:30 this morning. The patient had his coffee at 07:30 and then went to the bathroom afterward. The wife reports he was standing in the bathroom for 10-15 minutes which was unusual for him. He sat in his chair for a nap and could not get up afterward. Wife reports coughing today and was febrile on arrival to ED. The patient's son currently has Covid and was around the patient yesterday. PCP is Dr. Correa and neurologist is Dr. Avery.  Labs: ordered.  Radiology: ordered.  ECG/medicine tests: ordered and independent interpretation performed.     Details: No STEMI. Rhythm: Sinus Tachycardia. Heart Rate: 108. Ectopy: No Ectopy. Conduction: Normal. ST Segments: Normal ST Segments. T Waves: Normal. Axis: Normal. Clinical Impression: Sinus  Tachycardia   Done at 14:18     Risk  Prescription drug management.  Decision regarding hospitalization.            Scribe Attestation:   Scribe #1: I performed the above scribed service and the documentation accurately describes the services I performed. I attest to the accuracy of the note.  Scribe #2: I performed the above scribed service and the documentation accurately describes the services I performed. I attest to the accuracy of the note.    Attending Attestation:           Physician Attestation for Scribe:  Physician Attestation Statement for Scribe #1: IGabe Lance P, MD, reviewed documentation, as scribed by Gerber Herrera in my presence, and it is both accurate and complete.   Physician Attestation Statement for Scribe #2: Gabe NICK Lance P, MD, reviewed documentation, as scribed by Antoinette Castelan in my presence, and it is both accurate and complete. I also acknowledge and confirm the content of the note done by Scribe #1.          ED Course as of 07/02/24 1909 Tue Jul 02, 2024 1855 I discussed case with Dr. Jean.  He will admit patient on Zosyn and oxygen [LF]      ED Course User Index  [LF] Luis Manuel Bernal MD                           Clinical Impression:  Final diagnoses:  [R05.9] Cough  [Z20.822] Close exposure to COVID-19 virus  [R41.82] Altered mental status, unspecified altered mental status type  [G30.9, F02.80] Alzheimer's dementia, unspecified dementia severity, unspecified timing of dementia onset, unspecified whether behavioral, psychotic, or mood disturbance or anxiety  [R50.9] Fever, unspecified fever cause  [R09.02] Hypoxia          ED Disposition Condition    Admit Stable                Luis Manuel Bernal MD  07/02/24 1909

## 2024-07-03 PROBLEM — J98.4 PNEUMONITIS: Status: ACTIVE | Noted: 2024-07-03

## 2024-07-03 LAB
ALBUMIN SERPL-MCNC: 3.2 G/DL (ref 3.4–4.8)
ALBUMIN/GLOB SERPL: 1.2 RATIO (ref 1.1–2)
ALLENS TEST BLOOD GAS (OHS): YES
ALP SERPL-CCNC: 73 UNIT/L (ref 40–150)
ALT SERPL-CCNC: 9 UNIT/L (ref 0–55)
ANION GAP SERPL CALC-SCNC: 9 MEQ/L
AST SERPL-CCNC: 17 UNIT/L (ref 5–34)
BASE EXCESS BLD CALC-SCNC: 0.8 MMOL/L (ref -2–2)
BASOPHILS # BLD AUTO: 0.03 X10(3)/MCL
BASOPHILS NFR BLD AUTO: 0.3 %
BILIRUB SERPL-MCNC: 0.7 MG/DL
BLOOD GAS SAMPLE TYPE (OHS): ABNORMAL
BUN SERPL-MCNC: 19 MG/DL (ref 8.4–25.7)
CA-I BLD-SCNC: 1.15 MMOL/L (ref 1.12–1.23)
CALCIUM SERPL-MCNC: 8.2 MG/DL (ref 8.8–10)
CHLORIDE SERPL-SCNC: 109 MMOL/L (ref 98–107)
CO2 BLDA-SCNC: 26.5 MMOL/L
CO2 SERPL-SCNC: 21 MMOL/L (ref 23–31)
COHGB MFR BLDA: 1.7 % (ref 0.5–1.5)
CREAT SERPL-MCNC: 1.29 MG/DL (ref 0.73–1.18)
CREAT/UREA NIT SERPL: 15
DRAWN BY BLOOD GAS (OHS): ABNORMAL
EOSINOPHIL # BLD AUTO: 0.06 X10(3)/MCL (ref 0–0.9)
EOSINOPHIL NFR BLD AUTO: 0.6 %
ERYTHROCYTE [DISTWIDTH] IN BLOOD BY AUTOMATED COUNT: 15.1 % (ref 11.5–17)
GFR SERPLBLD CREATININE-BSD FMLA CKD-EPI: 57 ML/MIN/1.73/M2
GLOBULIN SER-MCNC: 2.7 GM/DL (ref 2.4–3.5)
GLUCOSE SERPL-MCNC: 116 MG/DL (ref 82–115)
HCO3 BLDA-SCNC: 25.3 MMOL/L (ref 22–26)
HCT VFR BLD AUTO: 31.6 % (ref 42–52)
HGB BLD-MCNC: 10.1 G/DL (ref 14–18)
IMM GRANULOCYTES # BLD AUTO: 0.07 X10(3)/MCL (ref 0–0.04)
IMM GRANULOCYTES NFR BLD AUTO: 0.7 %
LPM (OHS): 1
LYMPHOCYTES # BLD AUTO: 0.88 X10(3)/MCL (ref 0.6–4.6)
LYMPHOCYTES NFR BLD AUTO: 8.2 %
MCH RBC QN AUTO: 30.7 PG (ref 27–31)
MCHC RBC AUTO-ENTMCNC: 32 G/DL (ref 33–36)
MCV RBC AUTO: 96 FL (ref 80–94)
METHGB MFR BLDA: 0.7 % (ref 0.4–1.5)
MONOCYTES # BLD AUTO: 0.81 X10(3)/MCL (ref 0.1–1.3)
MONOCYTES NFR BLD AUTO: 7.6 %
NEUTROPHILS # BLD AUTO: 8.84 X10(3)/MCL (ref 2.1–9.2)
NEUTROPHILS NFR BLD AUTO: 82.6 %
NRBC BLD AUTO-RTO: 0 %
O2 HB BLOOD GAS (OHS): 94.9 % (ref 94–97)
OXYGEN DEVICE BLOOD GAS (OHS): ABNORMAL
OXYHGB MFR BLDA: 10.8 G/DL (ref 12–16)
PCO2 BLDA: 39 MMHG (ref 35–45)
PH BLDA: 7.42 [PH] (ref 7.35–7.45)
PLATELET # BLD AUTO: 155 X10(3)/MCL (ref 130–400)
PMV BLD AUTO: 10.5 FL (ref 7.4–10.4)
PO2 BLDA: 77 MMHG (ref 80–100)
POTASSIUM BLOOD GAS (OHS): 3.7 MMOL/L (ref 3.5–5)
POTASSIUM SERPL-SCNC: 4.2 MMOL/L (ref 3.5–5.1)
PROT SERPL-MCNC: 5.9 GM/DL (ref 5.8–7.6)
RBC # BLD AUTO: 3.29 X10(6)/MCL (ref 4.7–6.1)
SAMPLE SITE BLOOD GAS (OHS): ABNORMAL
SAO2 % BLDA: 95.5 %
SODIUM BLOOD GAS (OHS): 136 MMOL/L (ref 137–145)
SODIUM SERPL-SCNC: 139 MMOL/L (ref 136–145)
WBC # BLD AUTO: 10.69 X10(3)/MCL (ref 4.5–11.5)

## 2024-07-03 PROCEDURE — 36415 COLL VENOUS BLD VENIPUNCTURE: CPT | Performed by: EMERGENCY MEDICINE

## 2024-07-03 PROCEDURE — 82803 BLOOD GASES ANY COMBINATION: CPT

## 2024-07-03 PROCEDURE — 21400001 HC TELEMETRY ROOM

## 2024-07-03 PROCEDURE — 94760 N-INVAS EAR/PLS OXIMETRY 1: CPT | Mod: XB

## 2024-07-03 PROCEDURE — 36600 WITHDRAWAL OF ARTERIAL BLOOD: CPT

## 2024-07-03 PROCEDURE — 63600175 PHARM REV CODE 636 W HCPCS: Performed by: INTERNAL MEDICINE

## 2024-07-03 PROCEDURE — 80053 COMPREHEN METABOLIC PANEL: CPT | Performed by: EMERGENCY MEDICINE

## 2024-07-03 PROCEDURE — 97162 PT EVAL MOD COMPLEX 30 MIN: CPT

## 2024-07-03 PROCEDURE — 27000221 HC OXYGEN, UP TO 24 HOURS

## 2024-07-03 PROCEDURE — 5A09457 ASSISTANCE WITH RESPIRATORY VENTILATION, 24-96 CONSECUTIVE HOURS, CONTINUOUS POSITIVE AIRWAY PRESSURE: ICD-10-PCS | Performed by: INTERNAL MEDICINE

## 2024-07-03 PROCEDURE — 85025 COMPLETE CBC W/AUTO DIFF WBC: CPT | Performed by: EMERGENCY MEDICINE

## 2024-07-03 PROCEDURE — 99900031 HC PATIENT EDUCATION (STAT)

## 2024-07-03 PROCEDURE — S4991 NICOTINE PATCH NONLEGEND: HCPCS | Performed by: INTERNAL MEDICINE

## 2024-07-03 PROCEDURE — 99900035 HC TECH TIME PER 15 MIN (STAT)

## 2024-07-03 PROCEDURE — 25000003 PHARM REV CODE 250: Performed by: EMERGENCY MEDICINE

## 2024-07-03 PROCEDURE — 25000003 PHARM REV CODE 250: Performed by: INTERNAL MEDICINE

## 2024-07-03 RX ORDER — LANOLIN ALCOHOL/MO/W.PET/CERES
1 CREAM (GRAM) TOPICAL
Status: ON HOLD | COMMUNITY

## 2024-07-03 RX ORDER — HYDROCODONE BITARTRATE AND ACETAMINOPHEN 10; 325 MG/1; MG/1
1 TABLET ORAL 3 TIMES DAILY
Status: DISCONTINUED | OUTPATIENT
Start: 2024-07-03 | End: 2024-07-09 | Stop reason: HOSPADM

## 2024-07-03 RX ORDER — MEMANTINE HYDROCHLORIDE 5 MG/1
10 TABLET ORAL 2 TIMES DAILY
Status: DISCONTINUED | OUTPATIENT
Start: 2024-07-03 | End: 2024-07-09 | Stop reason: HOSPADM

## 2024-07-03 RX ORDER — LISINOPRIL 5 MG/1
5 TABLET ORAL DAILY
Status: DISCONTINUED | OUTPATIENT
Start: 2024-07-03 | End: 2024-07-09 | Stop reason: HOSPADM

## 2024-07-03 RX ORDER — ATORVASTATIN CALCIUM 10 MG/1
20 TABLET, FILM COATED ORAL DAILY
Status: DISCONTINUED | OUTPATIENT
Start: 2024-07-03 | End: 2024-07-09 | Stop reason: HOSPADM

## 2024-07-03 RX ORDER — IBUPROFEN 200 MG
1 TABLET ORAL DAILY
Status: DISCONTINUED | OUTPATIENT
Start: 2024-07-03 | End: 2024-07-09 | Stop reason: HOSPADM

## 2024-07-03 RX ORDER — ASPIRIN 81 MG/1
81 TABLET ORAL DAILY
Status: DISCONTINUED | OUTPATIENT
Start: 2024-07-03 | End: 2024-07-09 | Stop reason: HOSPADM

## 2024-07-03 RX ADMIN — ASPIRIN 81 MG: 81 TABLET, COATED ORAL at 03:07

## 2024-07-03 RX ADMIN — PIPERACILLIN SODIUM AND TAZOBACTAM SODIUM 4.5 G: 4; .5 INJECTION, POWDER, LYOPHILIZED, FOR SOLUTION INTRAVENOUS at 02:07

## 2024-07-03 RX ADMIN — PIPERACILLIN SODIUM AND TAZOBACTAM SODIUM 4.5 G: 4; .5 INJECTION, POWDER, LYOPHILIZED, FOR SOLUTION INTRAVENOUS at 08:07

## 2024-07-03 RX ADMIN — PREGABALIN 75 MG: 25 CAPSULE ORAL at 08:07

## 2024-07-03 RX ADMIN — HYDROCODONE BITARTRATE AND ACETAMINOPHEN 1 TABLET: 10; 325 TABLET ORAL at 08:07

## 2024-07-03 RX ADMIN — NICOTINE 1 PATCH: 21 PATCH, EXTENDED RELEASE TRANSDERMAL at 06:07

## 2024-07-03 RX ADMIN — FAMOTIDINE 20 MG: 10 INJECTION, SOLUTION INTRAVENOUS at 09:07

## 2024-07-03 RX ADMIN — MEMANTINE 10 MG: 5 TABLET ORAL at 08:07

## 2024-07-03 RX ADMIN — FAMOTIDINE 20 MG: 10 INJECTION, SOLUTION INTRAVENOUS at 08:07

## 2024-07-03 RX ADMIN — SERTRALINE HYDROCHLORIDE 75 MG: 50 TABLET ORAL at 03:07

## 2024-07-03 RX ADMIN — ATORVASTATIN CALCIUM 20 MG: 10 TABLET, FILM COATED ORAL at 03:07

## 2024-07-03 RX ADMIN — HYDROCODONE BITARTRATE AND ACETAMINOPHEN 1 TABLET: 10; 325 TABLET ORAL at 03:07

## 2024-07-03 NOTE — NURSING
Nurses Note -- 4 Eyes      7/2/2024   10:51 PM      Skin assessed during: Admit      [x] No Altered Skin Integrity Present    []Prevention Measures Documented      [] Yes- Altered Skin Integrity Present or Discovered   [] LDA Added if Not in Epic (Describe Wound)   [] New Altered Skin Integrity was Present on Admit and Documented in LDA   [] Wound Image Taken    Wound Care Consulted? No    Attending Nurse:  Bria Causey RN/Staff Member:   Esme Cannon

## 2024-07-03 NOTE — PT/OT/SLP EVAL
Physical Therapy Evaluation    Patient Name:  Rojas Foster   MRN:  [id number]    Recommendations:     Discharge therapy intensity: Moderate Intensity Therapy   Discharge Equipment Recommendations: other (see comments) (pending progress)   Barriers to discharge: Impaired mobility, Ongoing medical needs, and impaired cognition    Assessment:     Rojas Foster is a 79 y.o. male admitted with a medical diagnosis of pneumonitis, altered mental status. History of Alzheimer's disease.  He presents with the following impairments/functional limitations: weakness, impaired endurance, impaired self care skills, impaired functional mobility, gait instability, impaired balance, decreased lower extremity function, impaired cognition, decreased safety awareness, pain. Patient awake and alert throughout evaluation, however, required occasional cuing to keep eyes open. Pt presented with some confusion and impaired command following, requiring mod verbal and tactile cuing for mobility. Patient's PLOF was mod I with RW for ambulating household distances, and use of wheelchair for community navigation. Today he required mod A x 2 to total A x 2 for bed mobility. Attempted to complete sit<>stand with RW and max A x 2, however, pt unable to clear bottom from the bed. Currently recommending moderate intensity therapy upon discharge due to increased needs for assist with functional mobility. Will progress as tolerated.     Rehab Prognosis: Good; patient would benefit from acute skilled PT services to address these deficits and reach maximum level of function.    Recent Surgery: * No surgery found *      Plan:     During this hospitalization, patient would benefit from acute PT services 5 x/week to address the identified rehab impairments via gait training, therapeutic activities, therapeutic exercises, neuromuscular re-education and progress toward the following goals:    Plan of Care Expires:  08/02/24    Subjective     Chief  "Complaint: no complaints  Patient/Family Comments/goals: none reported  Pain/Comfort:  Pain Rating 1: other (see comments) (pt reports generalized pain "all over")  Pain Addressed 1: Distraction    Patients cultural, spiritual, Judaism conflicts given the current situation: no    Living Environment:  The patient lives with his wife in a single story home with a ramp to enter.  Prior to admission, patients level of function was ambulating household distances mod I with RW, and using wheelchair for community navigation.  Equipment used at home: walker, rolling, rollator, wheelchair.  DME owned (not currently used): none.  Upon discharge, patient will have limited assistance from wife and sons .    Objective:     Communicated with RN prior to session.  Patient found HOB elevated with PureWick, oxygen, telemetry, pulse ox (continuous)  upon PT entry to room.    General Precautions: Standard, fall  Orthopedic Precautions:N/A   Braces: N/A  Respiratory Status: Nasal cannula, flow 1 L/min; SpO2 95% throughout session      Exams:  Cognitive Exam:  Patient is oriented to Person, Time with cuing, and not oriented to place  RLE ROM: WFL  RLE Strength: knee extension 4/5, hip flexion 3/5, ankle DF 3/5, ankle PF 3/5  Accuracy limited 2/2 impaired command following  LLE ROM: WFL  LLE Strength: knee extension 2+/5, hip flexion 3/5, ankle DF 3/5, ankle PF 3/5  Accuracy limited 2/2 impaired command following      Functional Mobility:  Bed Mobility:     Rolling Right: total assistance  Supine to Sit: moderate assistance of 2 persons  Sit to Supine: total assistance of 2 persons  Attempted sit<>stand with RW and max A x 2, but unable to clear bottom from bed.   Mod tactile and verbal cuing for functional mobility    Treatment & Education:  Patient provided with verbal education education regarding PT role/goals/POC, fall prevention, safety awareness, and discharge/DME recommendations.  Understanding was verbalized, however " additional teaching warranted.     Patient left right sidelying with all lines intact, call button in reach, wedge under L side, bed alarm on, RN notified, and family present.    GOALS:   Multidisciplinary Problems       Physical Therapy Goals          Problem: Physical Therapy    Goal Priority Disciplines Outcome Goal Variances Interventions   Physical Therapy Goal     PT, PT/OT Progressing     Description: Patient will improve functional independence by performin. Supine to Sit with minimal assist.  2. Sit to Supine with minimal assist.  3. Sit to stand with rolling walker and moderate assist.   4. Bed to chair transfer using squat pivot vs stand-step transfer with rolling walker and moderate assist.                        History:     Past Medical History:   Diagnosis Date    Alzheimer's disease, unspecified (CODE)     Anxiety disorder, unspecified     Arthritis     HTN (hypertension)        Past Surgical History:   Procedure Laterality Date    BACK SURGERY      SHOULDER SURGERY      SHOULDER SURGERY Left        Time Tracking:     PT Received On:    PT Start Time: 1450     PT Stop Time: 1518  PT Total Time (min): 28 min     Billable Minutes: Evaluation 2024

## 2024-07-03 NOTE — H&P
"[facility] - 5th Floor Med Surg    History & Physical      Patient Name: Rojas Foster  MRN: [id number]  Admission Date: 7/2/2024  Attending Physician: Aldo Dahl MD   Primary Care Provider: Vishal Correa MD         Patient information was obtained from ER records.     Subjective:     Principal Problem:<principal problem not specified>    Chief Complaint:   Chief Complaint   Patient presents with    Altered Mental Status     Hx of Alzheimer's. Pt to ED with complaints of increased confusion per wife since 0400. Baseline GCS 14, GCS 12 at this time. Unable to complete FAST exam D/T pt not following commands. No slurred speech noted. Pt not answering questions, just responding to voice by saying, "yeh."        HPI: 79 /o male with history of htna nxiety alzeimers tia presented to the ed with complaints of worsening weakness confusion and cough and further reported to have exposed to covid and susbequent work up suggestive of fevers with encephalopathy and subsequentlya dmitted on iv abx and close monitoring    Past Medical History:   Diagnosis Date    Alzheimer's disease, unspecified (CODE)     Anxiety disorder, unspecified     Arthritis     HTN (hypertension)        Past Surgical History:   Procedure Laterality Date    BACK SURGERY      SHOULDER SURGERY      SHOULDER SURGERY Left        Review of patient's allergies indicates:   Allergen Reactions    Codeine Other (See Comments)     Other reaction(s): Confusion       No current facility-administered medications on file prior to encounter.     Current Outpatient Medications on File Prior to Encounter   Medication Sig    alendronate (FOSAMAX) 35 MG tablet Take 35 mg by mouth every 7 days.    aspirin (ECOTRIN) 81 MG EC tablet Take 81 mg by mouth once daily.    atorvastatin (LIPITOR) 20 MG tablet Take 20 mg by mouth once daily.    ferrous sulfate (FEOSOL) Tab tablet Take 1 tablet by mouth daily with breakfast.    HYDROcodone-acetaminophen " (NORCO)  mg per tablet Take 1 tablet by mouth 3 (three) times daily.    lisinopriL (PRINIVIL,ZESTRIL) 5 MG tablet Take 5 mg by mouth.    memantine (NAMENDA) 10 MG Tab Take 1 tablet (10 mg total) by mouth 2 (two) times daily.    multivitamin with minerals tablet Take 1 tablet by mouth once daily.    pregabalin (LYRICA) 75 MG capsule Take 75 mg by mouth.    sertraline (ZOLOFT) 50 MG tablet Take 1.5 tablets (75 mg total) by mouth once daily.    [DISCONTINUED] b complex vitamins tablet Take 1 tablet by mouth once daily.    [DISCONTINUED] COCONUT OIL ORAL Take 1,000 mg by mouth.    [DISCONTINUED] ferrous sulfate (FEOSOL) Tab tablet Take 1 tablet by mouth daily with breakfast.    [DISCONTINUED] levomefolate/algal oil (L-METHYLFOLATE FORMULA ORAL) Take by mouth. (Patient not taking: Reported on 4/30/2024)    [DISCONTINUED] magnesium 30 mg Tab Take 400 mg by mouth once. (Patient not taking: Reported on 4/30/2024)    [DISCONTINUED] magnesium oxide (MAG-OX) 400 mg (241.3 mg magnesium) tablet Take 400 mg by mouth once daily. (Patient not taking: Reported on 4/30/2024)    [DISCONTINUED] meloxicam (MOBIC) 15 MG tablet Take 1 tablet by mouth Daily. (Patient not taking: Reported on 4/30/2024)    [DISCONTINUED] mupirocin (BACTROBAN) 2 % ointment Apply topically 3 (three) times daily. (Patient not taking: Reported on 4/30/2024)    [DISCONTINUED] nicotine (NICODERM CQ) 21 mg/24 hr Place 1 patch onto the skin once daily. (Patient not taking: Reported on 4/30/2024)    [DISCONTINUED] polyethylene glycol (GLYCOLAX) 17 gram/dose powder Take 17 g by mouth once daily.    [DISCONTINUED] propylene glycol/peg 400 (SYSTANE ULTRA OPHT) Apply to eye.    [DISCONTINUED] quinapriL (ACCUPRIL) 5 MG tablet Take 1 tablet by mouth Daily. (Patient not taking: Reported on 4/30/2024)    [DISCONTINUED] rivaroxaban (XARELTO) 10 mg Tab Take 1 tablet (10 mg total) by mouth daily with dinner or evening meal. (Patient not taking: Reported on 3/20/2024)     [DISCONTINUED] triamterene-hydrochlorothiazide 37.5-25 mg (DYAZIDE) 37.5-25 mg per capsule Take 1 capsule by mouth every morning.     Family History       Problem Relation (Age of Onset)    Heart disease Mother    Hypertension Mother, Father          Tobacco Use    Smoking status: Never    Smokeless tobacco: Current     Types: Chew   Substance and Sexual Activity    Alcohol use: Not Currently    Drug use: Never    Sexual activity: Not Currently     Review of Systems   Constitutional:  Positive for fatigue and fever.   HENT: Negative.     Eyes: Negative.    Respiratory:  Positive for cough and shortness of breath.    Cardiovascular: Negative.    Gastrointestinal: Negative.    Endocrine: Negative.    Genitourinary: Negative.    Musculoskeletal: Negative.    Skin: Negative.    Allergic/Immunologic: Negative.    Neurological:  Positive for weakness.   Psychiatric/Behavioral: Negative.       Objective:     Vital Signs (Most Recent):  Temp: 97.9 °F (36.6 °C) (07/03/24 1100)  Pulse: 64 (07/03/24 1100)  Resp: 18 (07/03/24 1100)  BP: 104/62 (07/03/24 1100)  SpO2: 96 % (07/03/24 1100) Vital Signs (24h Range):  Temp:  [97.9 °F (36.6 °C)-101.3 °F (38.5 °C)] 97.9 °F (36.6 °C)  Pulse:  [] 64  Resp:  [18-21] 18  SpO2:  [93 %-97 %] 96 %  BP: (102-137)/(60-89) 104/62     Weight: 96.9 kg (213 lb 11.2 oz)  Body mass index is 32.49 kg/m².    Physical Exam  Vitals reviewed.   Constitutional:       Appearance: Normal appearance.   HENT:      Head: Normocephalic and atraumatic.      Right Ear: Tympanic membrane and external ear normal.      Nose: Nose normal.      Mouth/Throat:      Mouth: Mucous membranes are moist.   Eyes:      Extraocular Movements: Extraocular movements intact.      Pupils: Pupils are equal, round, and reactive to light.   Cardiovascular:      Rate and Rhythm: Normal rate and regular rhythm.      Pulses: Normal pulses.      Heart sounds: Normal heart sounds.   Pulmonary:      Effort: Pulmonary effort is normal.       Breath sounds: Rhonchi and rales present.   Abdominal:      General: Abdomen is flat. Bowel sounds are normal.      Palpations: Abdomen is soft.   Musculoskeletal:         General: Normal range of motion.      Cervical back: Normal range of motion and neck supple.   Skin:     General: Skin is warm and dry.   Neurological:      General: No focal deficit present.      Mental Status: He is alert and oriented to person, place, and time.   Psychiatric:         Mood and Affect: Mood normal.         Behavior: Behavior normal.           CRANIAL NERVES     CN III, IV, VI   Pupils are equal, round, and reactive to light.      Significant Labs: All pertinent labs within the past 24 hours have been reviewed.  Lab Results   Component Value Date    WBC 10.69 07/03/2024    HGB 10.1 (L) 07/03/2024    HCT 31.6 (L) 07/03/2024    MCV 96.0 (H) 07/03/2024     07/03/2024         Recent Labs   Lab 07/03/24  0506      K 4.2   *   CO2 21*   BUN 19.0   CREATININE 1.29*   GLUCOSE 116*   CALCIUM 8.2*       Significant Imaging: I have reviewed all pertinent imaging results/findings within the past 24 hours.    Assessment/Plan:     There are no hospital problems to display for this patient.    VTE Risk Mitigation (From admission, onward)           Ordered     IP VTE HIGH RISK PATIENT  Once         07/02/24 2005     Place sequential compression device  Until discontinued         07/02/24 2005                Pneumonitis  Metabolic encephalopathy  Dementia  Htn  Hld  H/o tia  Arthritis    Plan :  Ivf  Iv abx  Follow cx  Resume home meds  Labs in am  Gi and dvt ppx  Full code        Aldo Dahl MD  Department of Hospital Medicine   [facility] - 5th Floor Med Surg

## 2024-07-03 NOTE — PLAN OF CARE
07/03/24 1301   Discharge Assessment   Assessment Type Discharge Planning Assessment   Confirmed/corrected address, phone number and insurance Yes   Confirmed Demographics Correct on Facesheet   Source of Information patient   Communicated ALVARO with patient/caregiver Date not available/Unable to determine   Reason For Admission cough, AMS, Dementia   People in Home spouse   Do you expect to return to your current living situation? Yes   Do you have help at home or someone to help you manage your care at home? Yes   Who are your caregiver(s) and their phone number(s)? Ivania Foster   Walking or Climbing Stairs Difficulty yes   Walking or Climbing Stairs ambulation difficulty, requires equipment;stair climbing difficulty, requires equipment;transferring difficulty, requires equipment   Mobility Management walker wheelchair   Home Accessibility wheelchair accessible   Home Layout Able to live on 1st floor   Equipment Currently Used at Home walker, rolling;wheelchair;CPAP   Readmission within 30 days? No   Patient currently being followed by outpatient case management? No   Do you currently have service(s) that help you manage your care at home? No   Do you take prescription medications? Yes   Do you have prescription coverage? Yes   Coverage medicare   Do you have any problems affording any of your prescribed medications? No   Is the patient taking medications as prescribed? yes   Who is going to help you get home at discharge? spouse   How do you get to doctors appointments? family or friend will provide   Are you on dialysis? No   Do you take coumadin? No   Discharge Plan A Home with family   Discharge Plan B Home Health   DME Needed Upon Discharge  none   Discharge Plan discussed with: Patient;Spouse/sig other;Adult children   Transition of Care Barriers None

## 2024-07-03 NOTE — PT/OT/SLP PROGRESS
Physical Therapy      Patient Name:  Rojas Foster   MRN:  [id number]    PT orders received and chart reviewed. Patient not seen today secondary to RN hold 2/2 increased lethargy . Will follow-up tomorrow as scheduling allows.

## 2024-07-04 LAB
ALBUMIN SERPL-MCNC: 3 G/DL (ref 3.4–4.8)
ALBUMIN/GLOB SERPL: 1.2 RATIO (ref 1.1–2)
ALLENS TEST BLOOD GAS (OHS): YES
ALLENS TEST BLOOD GAS (OHS): YES
ALP SERPL-CCNC: 70 UNIT/L (ref 40–150)
ALT SERPL-CCNC: 8 UNIT/L (ref 0–55)
ANION GAP SERPL CALC-SCNC: 6 MEQ/L
AST SERPL-CCNC: 18 UNIT/L (ref 5–34)
BACTERIA UR CULT: ABNORMAL
BASE EXCESS BLD CALC-SCNC: -1.1 MMOL/L (ref -2–2)
BASE EXCESS BLD CALC-SCNC: -3.9 MMOL/L (ref -2–2)
BASOPHILS # BLD AUTO: 0.03 X10(3)/MCL
BASOPHILS NFR BLD AUTO: 0.3 %
BILIRUB SERPL-MCNC: 0.9 MG/DL
BLOOD GAS SAMPLE TYPE (OHS): ABNORMAL
BLOOD GAS SAMPLE TYPE (OHS): ABNORMAL
BUN SERPL-MCNC: 17 MG/DL (ref 8.4–25.7)
CA-I BLD-SCNC: 1.12 MMOL/L (ref 1.12–1.23)
CA-I BLD-SCNC: 1.13 MMOL/L (ref 1.12–1.23)
CALCIUM SERPL-MCNC: 8.1 MG/DL (ref 8.8–10)
CHLORIDE SERPL-SCNC: 109 MMOL/L (ref 98–107)
CO2 BLDA-SCNC: 21.9 MMOL/L
CO2 BLDA-SCNC: 24.7 MMOL/L
CO2 SERPL-SCNC: 24 MMOL/L (ref 23–31)
COHGB MFR BLDA: 1.3 % (ref 0.5–1.5)
COHGB MFR BLDA: 1.4 % (ref 0.5–1.5)
CPAP BLOOD GAS (OHS): 10 CM H2O
CREAT SERPL-MCNC: 1.38 MG/DL (ref 0.73–1.18)
CREAT/UREA NIT SERPL: 12
DRAWN BY BLOOD GAS (OHS): ABNORMAL
DRAWN BY BLOOD GAS (OHS): ABNORMAL
EOSINOPHIL # BLD AUTO: 0.07 X10(3)/MCL (ref 0–0.9)
EOSINOPHIL NFR BLD AUTO: 0.7 %
ERYTHROCYTE [DISTWIDTH] IN BLOOD BY AUTOMATED COUNT: 15.1 % (ref 11.5–17)
GFR SERPLBLD CREATININE-BSD FMLA CKD-EPI: 52 ML/MIN/1.73/M2
GLOBULIN SER-MCNC: 2.6 GM/DL (ref 2.4–3.5)
GLUCOSE SERPL-MCNC: 117 MG/DL (ref 82–115)
HCO3 BLDA-SCNC: 20.8 MMOL/L (ref 22–26)
HCO3 BLDA-SCNC: 23.5 MMOL/L (ref 22–26)
HCT VFR BLD AUTO: 30.4 % (ref 42–52)
HGB BLD-MCNC: 9.9 G/DL (ref 14–18)
IMM GRANULOCYTES # BLD AUTO: 0.07 X10(3)/MCL (ref 0–0.04)
IMM GRANULOCYTES NFR BLD AUTO: 0.7 %
INHALED O2 CONCENTRATION: 35 %
LYMPHOCYTES # BLD AUTO: 0.83 X10(3)/MCL (ref 0.6–4.6)
LYMPHOCYTES NFR BLD AUTO: 7.9 %
MCH RBC QN AUTO: 30.7 PG (ref 27–31)
MCHC RBC AUTO-ENTMCNC: 32.6 G/DL (ref 33–36)
MCV RBC AUTO: 94.1 FL (ref 80–94)
METHGB MFR BLDA: 0.6 % (ref 0.4–1.5)
METHGB MFR BLDA: 0.9 % (ref 0.4–1.5)
MODE (OHS): ABNORMAL
MONOCYTES # BLD AUTO: 0.97 X10(3)/MCL (ref 0.1–1.3)
MONOCYTES NFR BLD AUTO: 9.3 %
NEUTROPHILS # BLD AUTO: 8.48 X10(3)/MCL (ref 2.1–9.2)
NEUTROPHILS NFR BLD AUTO: 81.1 %
NRBC BLD AUTO-RTO: 0 %
O2 HB BLOOD GAS (OHS): 92.1 % (ref 94–97)
O2 HB BLOOD GAS (OHS): 96.2 % (ref 94–97)
OXYGEN DEVICE BLOOD GAS (OHS): ABNORMAL
OXYHGB MFR BLDA: 10.8 G/DL (ref 12–16)
OXYHGB MFR BLDA: 12 G/DL (ref 12–16)
PCO2 BLDA: 36 MMHG (ref 35–45)
PCO2 BLDA: 38 MMHG (ref 35–45)
PH BLDA: 7.37 [PH] (ref 7.35–7.45)
PH BLDA: 7.4 [PH] (ref 7.35–7.45)
PLATELET # BLD AUTO: 159 X10(3)/MCL (ref 130–400)
PMV BLD AUTO: 10.2 FL (ref 7.4–10.4)
PO2 BLDA: 67 MMHG (ref 80–100)
PO2 BLDA: 99 MMHG (ref 80–100)
POCT GLUCOSE: 138 MG/DL (ref 70–110)
POTASSIUM BLOOD GAS (OHS): 3.3 MMOL/L (ref 3.5–5)
POTASSIUM BLOOD GAS (OHS): 3.8 MMOL/L (ref 3.5–5)
POTASSIUM SERPL-SCNC: 3.6 MMOL/L (ref 3.5–5.1)
PROT SERPL-MCNC: 5.6 GM/DL (ref 5.8–7.6)
RBC # BLD AUTO: 3.23 X10(6)/MCL (ref 4.7–6.1)
SAMPLE SITE BLOOD GAS (OHS): ABNORMAL
SAMPLE SITE BLOOD GAS (OHS): ABNORMAL
SAO2 % BLDA: 92.4 %
SAO2 % BLDA: 97.7 %
SODIUM BLOOD GAS (OHS): 135 MMOL/L (ref 137–145)
SODIUM BLOOD GAS (OHS): 137 MMOL/L (ref 137–145)
SODIUM SERPL-SCNC: 139 MMOL/L (ref 136–145)
WBC # BLD AUTO: 10.45 X10(3)/MCL (ref 4.5–11.5)

## 2024-07-04 PROCEDURE — 63600175 PHARM REV CODE 636 W HCPCS: Performed by: INTERNAL MEDICINE

## 2024-07-04 PROCEDURE — 94660 CPAP INITIATION&MGMT: CPT

## 2024-07-04 PROCEDURE — 82803 BLOOD GASES ANY COMBINATION: CPT

## 2024-07-04 PROCEDURE — 93005 ELECTROCARDIOGRAM TRACING: CPT

## 2024-07-04 PROCEDURE — 36600 WITHDRAWAL OF ARTERIAL BLOOD: CPT

## 2024-07-04 PROCEDURE — 63600175 PHARM REV CODE 636 W HCPCS: Performed by: NURSE PRACTITIONER

## 2024-07-04 PROCEDURE — 25500020 PHARM REV CODE 255: Performed by: INTERNAL MEDICINE

## 2024-07-04 PROCEDURE — S4991 NICOTINE PATCH NONLEGEND: HCPCS | Performed by: INTERNAL MEDICINE

## 2024-07-04 PROCEDURE — 27100171 HC OXYGEN HIGH FLOW UP TO 24 HOURS

## 2024-07-04 PROCEDURE — A9577 INJ MULTIHANCE: HCPCS | Performed by: INTERNAL MEDICINE

## 2024-07-04 PROCEDURE — 99900031 HC PATIENT EDUCATION (STAT)

## 2024-07-04 PROCEDURE — 21400001 HC TELEMETRY ROOM

## 2024-07-04 PROCEDURE — 80053 COMPREHEN METABOLIC PANEL: CPT | Performed by: INTERNAL MEDICINE

## 2024-07-04 PROCEDURE — 27000190 HC CPAP FULL FACE MASK W/VALVE

## 2024-07-04 PROCEDURE — 99900035 HC TECH TIME PER 15 MIN (STAT)

## 2024-07-04 PROCEDURE — 25000003 PHARM REV CODE 250: Performed by: INTERNAL MEDICINE

## 2024-07-04 PROCEDURE — 85025 COMPLETE CBC W/AUTO DIFF WBC: CPT | Performed by: INTERNAL MEDICINE

## 2024-07-04 PROCEDURE — 36415 COLL VENOUS BLD VENIPUNCTURE: CPT | Performed by: INTERNAL MEDICINE

## 2024-07-04 PROCEDURE — 94760 N-INVAS EAR/PLS OXIMETRY 1: CPT | Mod: XB

## 2024-07-04 PROCEDURE — 93010 ELECTROCARDIOGRAM REPORT: CPT | Mod: ,,, | Performed by: INTERNAL MEDICINE

## 2024-07-04 RX ORDER — ACETAMINOPHEN 10 MG/ML
1000 INJECTION, SOLUTION INTRAVENOUS ONCE
Status: COMPLETED | OUTPATIENT
Start: 2024-07-04 | End: 2024-07-04

## 2024-07-04 RX ORDER — POLYETHYLENE GLYCOL 3350 17 G/17G
17 POWDER, FOR SOLUTION ORAL DAILY
Status: DISCONTINUED | OUTPATIENT
Start: 2024-07-04 | End: 2024-07-09 | Stop reason: HOSPADM

## 2024-07-04 RX ORDER — KETOROLAC TROMETHAMINE 30 MG/ML
15 INJECTION, SOLUTION INTRAMUSCULAR; INTRAVENOUS ONCE
Status: COMPLETED | OUTPATIENT
Start: 2024-07-04 | End: 2024-07-04

## 2024-07-04 RX ADMIN — MEMANTINE 10 MG: 5 TABLET ORAL at 09:07

## 2024-07-04 RX ADMIN — POLYETHYLENE GLYCOL 3350 17 G: 17 POWDER, FOR SOLUTION ORAL at 03:07

## 2024-07-04 RX ADMIN — MEMANTINE 10 MG: 5 TABLET ORAL at 08:07

## 2024-07-04 RX ADMIN — SODIUM CHLORIDE, POTASSIUM CHLORIDE, SODIUM LACTATE AND CALCIUM CHLORIDE: 600; 310; 30; 20 INJECTION, SOLUTION INTRAVENOUS at 11:07

## 2024-07-04 RX ADMIN — ATORVASTATIN CALCIUM 20 MG: 10 TABLET, FILM COATED ORAL at 09:07

## 2024-07-04 RX ADMIN — PIPERACILLIN SODIUM AND TAZOBACTAM SODIUM 4.5 G: 4; .5 INJECTION, POWDER, LYOPHILIZED, FOR SOLUTION INTRAVENOUS at 03:07

## 2024-07-04 RX ADMIN — ACETAMINOPHEN 1000 MG: 10 INJECTION, SOLUTION INTRAVENOUS at 02:07

## 2024-07-04 RX ADMIN — ACETAMINOPHEN 650 MG: 650 SUPPOSITORY RECTAL at 12:07

## 2024-07-04 RX ADMIN — FAMOTIDINE 20 MG: 10 INJECTION, SOLUTION INTRAVENOUS at 08:07

## 2024-07-04 RX ADMIN — PREGABALIN 75 MG: 25 CAPSULE ORAL at 08:07

## 2024-07-04 RX ADMIN — HYDROCODONE BITARTRATE AND ACETAMINOPHEN 1 TABLET: 10; 325 TABLET ORAL at 08:07

## 2024-07-04 RX ADMIN — PIPERACILLIN SODIUM AND TAZOBACTAM SODIUM 4.5 G: 4; .5 INJECTION, POWDER, LYOPHILIZED, FOR SOLUTION INTRAVENOUS at 08:07

## 2024-07-04 RX ADMIN — KETOROLAC TROMETHAMINE 15 MG: 30 INJECTION, SOLUTION INTRAMUSCULAR at 01:07

## 2024-07-04 RX ADMIN — FAMOTIDINE 20 MG: 10 INJECTION, SOLUTION INTRAVENOUS at 09:07

## 2024-07-04 RX ADMIN — NICOTINE 1 PATCH: 21 PATCH, EXTENDED RELEASE TRANSDERMAL at 09:07

## 2024-07-04 RX ADMIN — GADOBENATE DIMEGLUMINE 20 ML: 529 INJECTION, SOLUTION INTRAVENOUS at 05:07

## 2024-07-04 RX ADMIN — ONDANSETRON 4 MG: 2 INJECTION INTRAMUSCULAR; INTRAVENOUS at 10:07

## 2024-07-04 RX ADMIN — ASPIRIN 81 MG: 81 TABLET, COATED ORAL at 09:07

## 2024-07-04 RX ADMIN — PIPERACILLIN SODIUM AND TAZOBACTAM SODIUM 4.5 G: 4; .5 INJECTION, POWDER, LYOPHILIZED, FOR SOLUTION INTRAVENOUS at 05:07

## 2024-07-04 RX ADMIN — SERTRALINE HYDROCHLORIDE 75 MG: 50 TABLET ORAL at 09:07

## 2024-07-04 NOTE — PROGRESS NOTES
[facility] - 5th Floor [facility] Medicine  Progress Note    Patient Name: Rojas Foster  MRN: [id number]  Patient Class: IP- Inpatient   Admission Date: 7/2/2024  Length of Stay: 2 days  Attending Physician: Aldo Dahl MD  Primary Care Provider: Vishal Correa MD        Subjective:     Principal Problem:Pneumonitis    Interval History:   Today's info : seen and examined, no acute events overnight. Continues to improve   Mri neg  Ua positive  Urine cx pos for gram neg rods  Afebrile  Remains on iv abx  Confusion improving    Review of Systems   Constitutional:  Positive for fever.   HENT: Negative.     Eyes: Negative.    Respiratory:  Positive for shortness of breath.    Cardiovascular: Negative.    Gastrointestinal: Negative.    Endocrine: Negative.    Genitourinary: Negative.    Musculoskeletal: Negative.    Allergic/Immunologic: Negative.    Neurological:  Positive for weakness.   Psychiatric/Behavioral:  The patient is nervous/anxious.      Objective:     Vital Signs (Most Recent):  Temp: 98.3 °F (36.8 °C) (07/04/24 1145)  Pulse: (!) 54 (07/04/24 1058)  Resp: 13 (07/04/24 0523)  BP: 117/75 (07/04/24 1058)  SpO2: 99 % (07/04/24 1058) Vital Signs (24h Range):  Temp:  [97.5 °F (36.4 °C)-103 °F (39.4 °C)] 98.3 °F (36.8 °C)  Pulse:  [54-91] 54  Resp:  [13-26] 13  SpO2:  [93 %-99 %] 99 %  BP: ()/(56-77) 117/75     Weight: 96.9 kg (213 lb 11.2 oz)  Body mass index is 32.49 kg/m².    Intake/Output Summary (Last 24 hours) at 7/4/2024 1539  Last data filed at 7/4/2024 1300  Gross per 24 hour   Intake 360 ml   Output 1750 ml   Net -1390 ml      Physical Exam  Vitals reviewed.   Constitutional:       Appearance: Normal appearance.   HENT:      Head: Normocephalic and atraumatic.      Right Ear: Tympanic membrane and external ear normal.      Nose: Nose normal.      Mouth/Throat:      Mouth: Mucous membranes are moist.   Eyes:      Extraocular Movements: Extraocular movements  intact.      Pupils: Pupils are equal, round, and reactive to light.   Cardiovascular:      Rate and Rhythm: Normal rate and regular rhythm.      Pulses: Normal pulses.      Heart sounds: Normal heart sounds.   Pulmonary:      Effort: Pulmonary effort is normal.      Breath sounds: Normal breath sounds.   Abdominal:      General: Abdomen is flat. Bowel sounds are normal.      Palpations: Abdomen is soft.   Musculoskeletal:         General: Normal range of motion.      Cervical back: Normal range of motion and neck supple.   Skin:     General: Skin is warm and dry.   Neurological:      General: No focal deficit present.      Mental Status: He is alert and oriented to person, place, and time.   Psychiatric:         Mood and Affect: Mood normal.         Behavior: Behavior normal.           Overview/Hospital Course: stable    Significant Labs: All pertinent labs within the past 24 hours have been reviewed.  Lab Results   Component Value Date    WBC 10.45 07/04/2024    HGB 9.9 (L) 07/04/2024    HCT 30.4 (L) 07/04/2024    MCV 94.1 (H) 07/04/2024     07/04/2024         Recent Labs   Lab 07/04/24  0453      K 3.6   *   CO2 24   BUN 17.0   CREATININE 1.38*   GLUCOSE 117*   CALCIUM 8.1*       Significant Imaging: I have reviewed all pertinent imaging results/findings within the past 24 hours.    Assessment/Plan:      Active Diagnoses:    Diagnosis Date Noted POA    PRINCIPAL PROBLEM:  Pneumonitis [J98.4] 07/03/2024 Unknown      Problems Resolved During this Admission:     VTE Risk Mitigation (From admission, onward)           Ordered     IP VTE HIGH RISK PATIENT  Once         07/02/24 2005     Place sequential compression device  Until discontinued         07/02/24 2005                     Pneumonitis  Metabolic encephalopathy  Dementia  Htn  Hld  H/o tia  Arthritis  Acute cystitis     Plan :  Ivf  Iv abx  Follow cx  Labs in am  Gi and dvt ppx  Full code    Aldo Dahl MD  Department of Hospital  Medicine   [facility]

## 2024-07-04 NOTE — AI DETERIORATION ALERT
Artificial Intelligence Notification  [facility]  [address] [place] [address]  Phone: [phone]    This documentation was triggered by an Artificial Intelligence Notification:    Admit Date: 2024   LOS: 2  Code Status: Full Code  : 1945  Age: 79 y.o.  Weight:   Wt Readings from Last 1 Encounters:   24 96.9 kg (213 lb 11.2 oz)        Sex: male  Bed: 503/503 A  MRN: [id number]  Attending Physician: Aldo Dahl MD     Date of Alert: 2024  Time AI Alert Received:              Vitals:    24 0139   BP:    Pulse:    Resp:    Temp: (!) 103 °F (39.4 °C)     SpO2: 95 %      Artificial Intelligence alert discussed with Provider:     Name: JERRI Fraser   Date/Time of Provider Notification: 0230      Patient Condition: Just responded to RRT called on pt. Repeat ABG's on CPAP WNL. Rectal temp 103 after tylenol suppository given. Recommend Ofirmev. Cooling blanket applied along with ice packs. Will follow up.

## 2024-07-05 LAB
OHS QRS DURATION: 84 MS
OHS QTC CALCULATION: 462 MS

## 2024-07-05 PROCEDURE — 25000003 PHARM REV CODE 250: Performed by: INTERNAL MEDICINE

## 2024-07-05 PROCEDURE — S4991 NICOTINE PATCH NONLEGEND: HCPCS | Performed by: INTERNAL MEDICINE

## 2024-07-05 PROCEDURE — 63600175 PHARM REV CODE 636 W HCPCS: Performed by: INTERNAL MEDICINE

## 2024-07-05 PROCEDURE — 94760 N-INVAS EAR/PLS OXIMETRY 1: CPT

## 2024-07-05 PROCEDURE — 97530 THERAPEUTIC ACTIVITIES: CPT

## 2024-07-05 PROCEDURE — 21400001 HC TELEMETRY ROOM

## 2024-07-05 PROCEDURE — 97116 GAIT TRAINING THERAPY: CPT

## 2024-07-05 RX ADMIN — HYDROCODONE BITARTRATE AND ACETAMINOPHEN 1 TABLET: 10; 325 TABLET ORAL at 03:07

## 2024-07-05 RX ADMIN — HYDROCODONE BITARTRATE AND ACETAMINOPHEN 1 TABLET: 10; 325 TABLET ORAL at 08:07

## 2024-07-05 RX ADMIN — ASPIRIN 81 MG: 81 TABLET, COATED ORAL at 09:07

## 2024-07-05 RX ADMIN — SERTRALINE HYDROCHLORIDE 75 MG: 50 TABLET ORAL at 09:07

## 2024-07-05 RX ADMIN — NICOTINE 1 PATCH: 21 PATCH, EXTENDED RELEASE TRANSDERMAL at 09:07

## 2024-07-05 RX ADMIN — FAMOTIDINE 20 MG: 10 INJECTION, SOLUTION INTRAVENOUS at 09:07

## 2024-07-05 RX ADMIN — ATORVASTATIN CALCIUM 20 MG: 10 TABLET, FILM COATED ORAL at 09:07

## 2024-07-05 RX ADMIN — MEMANTINE 10 MG: 5 TABLET ORAL at 09:07

## 2024-07-05 RX ADMIN — PIPERACILLIN SODIUM AND TAZOBACTAM SODIUM 4.5 G: 4; .5 INJECTION, POWDER, LYOPHILIZED, FOR SOLUTION INTRAVENOUS at 05:07

## 2024-07-05 RX ADMIN — PIPERACILLIN SODIUM AND TAZOBACTAM SODIUM 4.5 G: 4; .5 INJECTION, POWDER, LYOPHILIZED, FOR SOLUTION INTRAVENOUS at 01:07

## 2024-07-05 RX ADMIN — LISINOPRIL 5 MG: 5 TABLET ORAL at 09:07

## 2024-07-05 RX ADMIN — HYDROCODONE BITARTRATE AND ACETAMINOPHEN 1 TABLET: 10; 325 TABLET ORAL at 09:07

## 2024-07-05 RX ADMIN — FAMOTIDINE 20 MG: 10 INJECTION, SOLUTION INTRAVENOUS at 08:07

## 2024-07-05 RX ADMIN — PIPERACILLIN SODIUM AND TAZOBACTAM SODIUM 4.5 G: 4; .5 INJECTION, POWDER, LYOPHILIZED, FOR SOLUTION INTRAVENOUS at 11:07

## 2024-07-05 RX ADMIN — MEMANTINE 10 MG: 5 TABLET ORAL at 08:07

## 2024-07-05 RX ADMIN — POLYETHYLENE GLYCOL 3350 17 G: 17 POWDER, FOR SOLUTION ORAL at 09:07

## 2024-07-05 RX ADMIN — PREGABALIN 75 MG: 25 CAPSULE ORAL at 08:07

## 2024-07-05 RX ADMIN — PREGABALIN 75 MG: 25 CAPSULE ORAL at 09:07

## 2024-07-05 NOTE — PT/OT/SLP PROGRESS
Physical Therapy Treatment    Patient Name:  Rojas Foster   MRN:  [id number]    Recommendations:     Discharge therapy intensity: Moderate Intensity Therapy   Discharge Equipment Recommendations: to be determined by next level of care  Barriers to discharge: Impaired mobility and Ongoing medical needs    Assessment:     Rojas Foster is a 79 y.o. male admitted with a medical diagnosis of pneumonitis, altered mental status, UTI. History of Alzheimer's disease.  He presents with the following impairments/functional limitations: weakness, impaired endurance, impaired self care skills, impaired functional mobility, gait instability, impaired balance, decreased lower extremity function, impaired cognition, decreased safety awareness, pain. Pt with significant improvement in level of alertness and functional mobility since evaluation. Pt able to progress to out of bed mobility and ambulation to commode using RW with min A. Extended time for all mobility with decreased LLE advancement and cues for sequencing. Due to severity of deficits PT continues to recommend post acute placement with moderate intensity.    Rehab Prognosis: Fair; patient would benefit from acute skilled PT services to address these deficits and reach maximum level of function.    Recent Surgery: * No surgery found *      Plan:     During this hospitalization, patient would benefit from acute PT services 5 x/week to address the identified rehab impairments via gait training, therapeutic activities, therapeutic exercises, neuromuscular re-education and progress toward the following goals:    Plan of Care Expires:  08/02/24    Subjective     Chief Complaint: low back pain  Patient/Family Comments/goals: get stronger and return home with wife  Pain/Comfort:  Pain Rating 1: 4/10  Location - Orientation 1: lower  Location 1: back  Pain Addressed 1: Reposition, Distraction      Objective:     Communicated with nurse prior to session.  Patient found left  sidelying with pulse ox (continuous), telemetry, peripheral IV, PureWick upon PT entry to room.     General Precautions: Standard, fall  Orthopedic Precautions: N/A  Braces: N/A  Respiratory Status: Room air      Functional Mobility:  Bed Mobility:     Rolling Right: minimum assistance  Supine to Sit: minimum assistance  Transfers:     Sit to Stand:  minimum assistance with rolling walker  Gait: 10ft + 8ft with RW, min A slow brody, decreased L LE step length and foot clearance with poor proprioceptive awareness and verbal cues for sequencing  Balance: CGA for sitting balance with flexed posture    Therapeutic Activities/Exercises:  Pt with (+) void on commode requiring assist for pericare and management of brief. Supported standing balance with wide MANISHA, CGA, flexed posture    Education:  Patient provided with verbal education and demonstrations education regarding PT role/goals/POC, fall prevention, and safety awareness.  Understanding was verbalized, however additional teaching warranted.     Patient left up in chair with all lines intact, call button in reach, nurse notified, and wife present    GOALS:   Multidisciplinary Problems       Physical Therapy Goals          Problem: Physical Therapy    Goal Priority Disciplines Outcome Goal Variances Interventions   Physical Therapy Goal     PT, PT/OT Progressing     Description: Patient will improve functional independence by performin. Supine to Sit with minimal assist.- MET 24  Pt performs bed mobility SBA.  2. Sit to Supine with minimal assist.- MET 24  3. Sit to stand with rolling walker and moderate assist.- MET 24  Pt performs functional transfers SBA with RW.   4. Bed to chair transfer using squat pivot vs stand-step transfer with rolling walker and moderate assist. - MET 24  5. Pt ambulates with RW x 150ft with RW, SBA                           Time Tracking:     PT Received On: 24  PT Start Time: 1244     PT Stop Time: 1315  PT  Total Time (min): 31 min     Billable Minutes: Gait Training 15 and Therapeutic Activity 16    Treatment Type: Treatment  PT/PTA: PT     Number of PTA visits since last PT visit: 1 07/05/2024

## 2024-07-05 NOTE — PLAN OF CARE
Problem: Physical Therapy  Goal: Physical Therapy Goal  Description: Patient will improve functional independence by performin. Supine to Sit with minimal assist.- MET 24  Pt performs bed mobility SBA.  2. Sit to Supine with minimal assist.- MET 24  3. Sit to stand with rolling walker and moderate assist.- MET 24  Pt performs functional transfers SBA with RW.   4. Bed to chair transfer using squat pivot vs stand-step transfer with rolling walker and moderate assist. - MET 24  5. Pt ambulates with RW x 150ft with RW, SBA      Outcome: Progressing

## 2024-07-05 NOTE — PROGRESS NOTES
[facility] - 5th Floor [facility] Medicine  Progress Note    Patient Name: Rojas Foster  MRN: [id number]  Patient Class: IP- Inpatient   Admission Date: 7/2/2024  Length of Stay: 3 days  Attending Physician: Aldo Dahl MD  Primary Care Provider: Vishal Correa MD        Subjective:     Principal Problem:Pneumonitis    Interval History:   Today's info : seen and examined, no acute events overnight. Continues to improve   Mri neg  Ua positive  Ecoli in urine  Afebrile  Remains on iv abx  Confusion improving    Review of Systems   Constitutional:  Positive for fever.   HENT: Negative.     Eyes: Negative.    Respiratory:  Positive for shortness of breath.    Cardiovascular: Negative.    Gastrointestinal: Negative.    Endocrine: Negative.    Genitourinary: Negative.    Musculoskeletal: Negative.    Allergic/Immunologic: Negative.    Neurological:  Positive for weakness.   Psychiatric/Behavioral:  The patient is nervous/anxious.      Objective:     Vital Signs (Most Recent):  Temp: 97.7 °F (36.5 °C) (07/05/24 1053)  Pulse: (!) 51 (07/05/24 1053)  Resp: 18 (07/05/24 0944)  BP: (!) 143/82 (07/05/24 1053)  SpO2: 95 % (07/05/24 1200) Vital Signs (24h Range):  Temp:  [97.4 °F (36.3 °C)-98.3 °F (36.8 °C)] 97.7 °F (36.5 °C)  Pulse:  [47-62] 51  Resp:  [18] 18  SpO2:  [92 %-98 %] 95 %  BP: (106-143)/(64-82) 143/82     Weight: 96.9 kg (213 lb 11.2 oz)  Body mass index is 32.49 kg/m².    Intake/Output Summary (Last 24 hours) at 7/5/2024 1356  Last data filed at 7/5/2024 1353  Gross per 24 hour   Intake 720 ml   Output 2201 ml   Net -1481 ml      Physical Exam  Vitals reviewed.   Constitutional:       Appearance: Normal appearance.   HENT:      Head: Normocephalic and atraumatic.      Right Ear: Tympanic membrane and external ear normal.      Nose: Nose normal.      Mouth/Throat:      Mouth: Mucous membranes are moist.   Eyes:      Extraocular Movements: Extraocular movements intact.       Pupils: Pupils are equal, round, and reactive to light.   Cardiovascular:      Rate and Rhythm: Normal rate and regular rhythm.      Pulses: Normal pulses.      Heart sounds: Normal heart sounds.   Pulmonary:      Effort: Pulmonary effort is normal.      Breath sounds: Normal breath sounds.   Abdominal:      General: Abdomen is flat. Bowel sounds are normal.      Palpations: Abdomen is soft.   Musculoskeletal:         General: Normal range of motion.      Cervical back: Normal range of motion and neck supple.   Skin:     General: Skin is warm and dry.   Neurological:      General: No focal deficit present.      Mental Status: He is alert and oriented to person, place, and time.   Psychiatric:         Mood and Affect: Mood normal.         Behavior: Behavior normal.           Overview/Hospital Course: stable    Significant Labs: All pertinent labs within the past 24 hours have been reviewed.  Lab Results   Component Value Date    WBC 10.45 07/04/2024    HGB 9.9 (L) 07/04/2024    HCT 30.4 (L) 07/04/2024    MCV 94.1 (H) 07/04/2024     07/04/2024         Recent Labs   Lab 07/04/24  0453      K 3.6   *   CO2 24   BUN 17.0   CREATININE 1.38*   GLUCOSE 117*   CALCIUM 8.1*       Significant Imaging: I have reviewed all pertinent imaging results/findings within the past 24 hours.    Assessment/Plan:      Active Diagnoses:    Diagnosis Date Noted POA    PRINCIPAL PROBLEM:  Pneumonitis [J98.4] 07/03/2024 Unknown      Problems Resolved During this Admission:     VTE Risk Mitigation (From admission, onward)           Ordered     IP VTE HIGH RISK PATIENT  Once         07/02/24 2005     Place sequential compression device  Until discontinued         07/02/24 2005                     Pneumonitis  Metabolic encephalopathy  Dementia  Htn  Hld  H/o tia  Arthritis  Acute cystitis     Plan :  Ivf  Iv abx  Follow cx  Labs in am  Gi and dvt ppx  Full code  Anticipated dc in 24 to 48 hrs    Aldo Dahl  MD  Department of Hospital Medicine   [facility]

## 2024-07-05 NOTE — PLAN OF CARE
Spoke to patient and wife about placement gave patient choice list she will research and give me her choice Monday

## 2024-07-06 LAB
ALBUMIN SERPL-MCNC: 2.9 G/DL (ref 3.4–4.8)
ALBUMIN/GLOB SERPL: 0.8 RATIO (ref 1.1–2)
ALP SERPL-CCNC: 60 UNIT/L (ref 40–150)
ALT SERPL-CCNC: 11 UNIT/L (ref 0–55)
ANION GAP SERPL CALC-SCNC: 11 MEQ/L
AST SERPL-CCNC: 18 UNIT/L (ref 5–34)
BASOPHILS # BLD AUTO: 0.02 X10(3)/MCL
BASOPHILS NFR BLD AUTO: 0.3 %
BILIRUB SERPL-MCNC: 0.4 MG/DL
BUN SERPL-MCNC: 14 MG/DL (ref 8.4–25.7)
CALCIUM SERPL-MCNC: 8.8 MG/DL (ref 8.8–10)
CHLORIDE SERPL-SCNC: 111 MMOL/L (ref 98–107)
CO2 SERPL-SCNC: 23 MMOL/L (ref 23–31)
CREAT SERPL-MCNC: 1.22 MG/DL (ref 0.73–1.18)
CREAT/UREA NIT SERPL: 11
EOSINOPHIL # BLD AUTO: 0.73 X10(3)/MCL (ref 0–0.9)
EOSINOPHIL NFR BLD AUTO: 9.7 %
ERYTHROCYTE [DISTWIDTH] IN BLOOD BY AUTOMATED COUNT: 15 % (ref 11.5–17)
GFR SERPLBLD CREATININE-BSD FMLA CKD-EPI: >60 ML/MIN/1.73/M2
GLOBULIN SER-MCNC: 3.6 GM/DL (ref 2.4–3.5)
GLUCOSE SERPL-MCNC: 84 MG/DL (ref 82–115)
HCT VFR BLD AUTO: 33.7 % (ref 42–52)
HGB BLD-MCNC: 10.8 G/DL (ref 14–18)
IMM GRANULOCYTES # BLD AUTO: 0.06 X10(3)/MCL (ref 0–0.04)
IMM GRANULOCYTES NFR BLD AUTO: 0.8 %
LYMPHOCYTES # BLD AUTO: 1.61 X10(3)/MCL (ref 0.6–4.6)
LYMPHOCYTES NFR BLD AUTO: 21.5 %
MCH RBC QN AUTO: 30.6 PG (ref 27–31)
MCHC RBC AUTO-ENTMCNC: 32 G/DL (ref 33–36)
MCV RBC AUTO: 95.5 FL (ref 80–94)
MONOCYTES # BLD AUTO: 0.78 X10(3)/MCL (ref 0.1–1.3)
MONOCYTES NFR BLD AUTO: 10.4 %
NEUTROPHILS # BLD AUTO: 4.3 X10(3)/MCL (ref 2.1–9.2)
NEUTROPHILS NFR BLD AUTO: 57.3 %
NRBC BLD AUTO-RTO: 0 %
PLATELET # BLD AUTO: 194 X10(3)/MCL (ref 130–400)
PMV BLD AUTO: 10.8 FL (ref 7.4–10.4)
POTASSIUM SERPL-SCNC: 3.4 MMOL/L (ref 3.5–5.1)
PROT SERPL-MCNC: 6.5 GM/DL (ref 5.8–7.6)
RBC # BLD AUTO: 3.53 X10(6)/MCL (ref 4.7–6.1)
SODIUM SERPL-SCNC: 145 MMOL/L (ref 136–145)
WBC # BLD AUTO: 7.5 X10(3)/MCL (ref 4.5–11.5)

## 2024-07-06 PROCEDURE — S4991 NICOTINE PATCH NONLEGEND: HCPCS | Performed by: INTERNAL MEDICINE

## 2024-07-06 PROCEDURE — 63600175 PHARM REV CODE 636 W HCPCS: Performed by: INTERNAL MEDICINE

## 2024-07-06 PROCEDURE — 80053 COMPREHEN METABOLIC PANEL: CPT | Performed by: INTERNAL MEDICINE

## 2024-07-06 PROCEDURE — 25000003 PHARM REV CODE 250: Performed by: INTERNAL MEDICINE

## 2024-07-06 PROCEDURE — 85025 COMPLETE CBC W/AUTO DIFF WBC: CPT | Performed by: INTERNAL MEDICINE

## 2024-07-06 PROCEDURE — 21400001 HC TELEMETRY ROOM

## 2024-07-06 PROCEDURE — 36415 COLL VENOUS BLD VENIPUNCTURE: CPT | Performed by: INTERNAL MEDICINE

## 2024-07-06 RX ADMIN — FAMOTIDINE 20 MG: 10 INJECTION, SOLUTION INTRAVENOUS at 09:07

## 2024-07-06 RX ADMIN — MEMANTINE 10 MG: 5 TABLET ORAL at 09:07

## 2024-07-06 RX ADMIN — PIPERACILLIN SODIUM AND TAZOBACTAM SODIUM 4.5 G: 4; .5 INJECTION, POWDER, LYOPHILIZED, FOR SOLUTION INTRAVENOUS at 06:07

## 2024-07-06 RX ADMIN — SERTRALINE HYDROCHLORIDE 75 MG: 50 TABLET ORAL at 06:07

## 2024-07-06 RX ADMIN — NICOTINE 1 PATCH: 21 PATCH, EXTENDED RELEASE TRANSDERMAL at 09:07

## 2024-07-06 RX ADMIN — SODIUM CHLORIDE, POTASSIUM CHLORIDE, SODIUM LACTATE AND CALCIUM CHLORIDE: 600; 310; 30; 20 INJECTION, SOLUTION INTRAVENOUS at 09:07

## 2024-07-06 RX ADMIN — ASPIRIN 81 MG: 81 TABLET, COATED ORAL at 09:07

## 2024-07-06 RX ADMIN — POLYETHYLENE GLYCOL 3350 17 G: 17 POWDER, FOR SOLUTION ORAL at 09:07

## 2024-07-06 RX ADMIN — PREGABALIN 75 MG: 25 CAPSULE ORAL at 09:07

## 2024-07-06 RX ADMIN — SODIUM CHLORIDE, POTASSIUM CHLORIDE, SODIUM LACTATE AND CALCIUM CHLORIDE: 600; 310; 30; 20 INJECTION, SOLUTION INTRAVENOUS at 03:07

## 2024-07-06 RX ADMIN — LISINOPRIL 5 MG: 5 TABLET ORAL at 09:07

## 2024-07-06 RX ADMIN — HYDROCODONE BITARTRATE AND ACETAMINOPHEN 1 TABLET: 10; 325 TABLET ORAL at 06:07

## 2024-07-06 RX ADMIN — PIPERACILLIN SODIUM AND TAZOBACTAM SODIUM 4.5 G: 4; .5 INJECTION, POWDER, LYOPHILIZED, FOR SOLUTION INTRAVENOUS at 10:07

## 2024-07-06 RX ADMIN — HYDROCODONE BITARTRATE AND ACETAMINOPHEN 1 TABLET: 10; 325 TABLET ORAL at 09:07

## 2024-07-06 RX ADMIN — ATORVASTATIN CALCIUM 20 MG: 10 TABLET, FILM COATED ORAL at 09:07

## 2024-07-06 NOTE — PROGRESS NOTES
[facility] - 5th Floor [facility] Medicine  Progress Note    Patient Name: Rojas Foster  MRN: [id number]  Patient Class: IP- Inpatient   Admission Date: 7/2/2024  Length of Stay: 4 days  Attending Physician: Aldo Dahl MD  Primary Care Provider: Vishal Correa MD        Subjective:     Principal Problem:Pneumonitis    Interval History:   Today's info : seen and examined, no acute events overnight. Continues to improve   Mri neg  Ua positive  Ecoli in urine  Afebrile  Remains on iv abx  Confusion improving    Rehab monday    Review of Systems   Constitutional:  Positive for fever.   HENT: Negative.     Eyes: Negative.    Respiratory:  Positive for shortness of breath.    Cardiovascular: Negative.    Gastrointestinal: Negative.    Endocrine: Negative.    Genitourinary: Negative.    Musculoskeletal: Negative.    Allergic/Immunologic: Negative.    Neurological:  Positive for weakness.   Psychiatric/Behavioral:  The patient is nervous/anxious.      Objective:     Vital Signs (Most Recent):  Temp: 98 °F (36.7 °C) (07/06/24 1059)  Pulse: (!) 52 (07/06/24 1059)  Resp: 18 (07/06/24 0938)  BP: 132/79 (07/06/24 1059)  SpO2: 97 % (07/06/24 1059) Vital Signs (24h Range):  Temp:  [97.5 °F (36.4 °C)-98.1 °F (36.7 °C)] 98 °F (36.7 °C)  Pulse:  [52-63] 52  Resp:  [18-19] 18  SpO2:  [92 %-98 %] 97 %  BP: (117-162)/(69-92) 132/79     Weight: 96.9 kg (213 lb 11.2 oz)  Body mass index is 32.49 kg/m².    Intake/Output Summary (Last 24 hours) at 7/6/2024 1428  Last data filed at 7/6/2024 1300  Gross per 24 hour   Intake 600 ml   Output 1450 ml   Net -850 ml      Physical Exam  Vitals reviewed.   Constitutional:       Appearance: Normal appearance.   HENT:      Head: Normocephalic and atraumatic.      Right Ear: Tympanic membrane and external ear normal.      Nose: Nose normal.      Mouth/Throat:      Mouth: Mucous membranes are moist.   Eyes:      Extraocular Movements: Extraocular movements  intact.      Pupils: Pupils are equal, round, and reactive to light.   Cardiovascular:      Rate and Rhythm: Normal rate and regular rhythm.      Pulses: Normal pulses.      Heart sounds: Normal heart sounds.   Pulmonary:      Effort: Pulmonary effort is normal.      Breath sounds: Normal breath sounds.   Abdominal:      General: Abdomen is flat. Bowel sounds are normal.      Palpations: Abdomen is soft.   Musculoskeletal:         General: Normal range of motion.      Cervical back: Normal range of motion and neck supple.   Skin:     General: Skin is warm and dry.   Neurological:      General: No focal deficit present.      Mental Status: He is alert and oriented to person, place, and time.   Psychiatric:         Mood and Affect: Mood normal.         Behavior: Behavior normal.           Overview/Hospital Course: stable    Significant Labs: All pertinent labs within the past 24 hours have been reviewed.  Lab Results   Component Value Date    WBC 7.50 07/06/2024    HGB 10.8 (L) 07/06/2024    HCT 33.7 (L) 07/06/2024    MCV 95.5 (H) 07/06/2024     07/06/2024         Recent Labs   Lab 07/06/24  0503      K 3.4*   *   CO2 23   BUN 14.0   CREATININE 1.22*   GLUCOSE 84   CALCIUM 8.8       Significant Imaging: I have reviewed all pertinent imaging results/findings within the past 24 hours.    Assessment/Plan:      Active Diagnoses:    Diagnosis Date Noted POA    PRINCIPAL PROBLEM:  Pneumonitis [J98.4] 07/03/2024 Unknown      Problems Resolved During this Admission:     VTE Risk Mitigation (From admission, onward)           Ordered     IP VTE HIGH RISK PATIENT  Once         07/02/24 2005     Place sequential compression device  Until discontinued         07/02/24 2005                     Pneumonitis  Metabolic encephalopathy  Dementia  Htn  Hld  H/o tia  Arthritis  Acute cystitis     Plan :  Ivf  Iv abx  Follow cx  Labs in am  Gi and dvt ppx  Full code  Anticipated dc in 24 to 48 hrs    Aldo Dahl  MD  Department of Hospital Medicine   [facility] - 5[facility] Med [facility]

## 2024-07-07 LAB
ALBUMIN SERPL-MCNC: 3.2 G/DL (ref 3.4–4.8)
ALBUMIN/GLOB SERPL: 1 RATIO (ref 1.1–2)
ALP SERPL-CCNC: 67 UNIT/L (ref 40–150)
ALT SERPL-CCNC: 14 UNIT/L (ref 0–55)
ANION GAP SERPL CALC-SCNC: 10 MEQ/L
AST SERPL-CCNC: 20 UNIT/L (ref 5–34)
BACTERIA BLD CULT: NORMAL
BACTERIA BLD CULT: NORMAL
BASOPHILS # BLD AUTO: 0.05 X10(3)/MCL
BASOPHILS NFR BLD AUTO: 0.6 %
BILIRUB SERPL-MCNC: 0.6 MG/DL
BUN SERPL-MCNC: 12 MG/DL (ref 8.4–25.7)
CALCIUM SERPL-MCNC: 9.1 MG/DL (ref 8.8–10)
CHLORIDE SERPL-SCNC: 108 MMOL/L (ref 98–107)
CO2 SERPL-SCNC: 26 MMOL/L (ref 23–31)
CREAT SERPL-MCNC: 1.26 MG/DL (ref 0.73–1.18)
CREAT/UREA NIT SERPL: 10
EOSINOPHIL # BLD AUTO: 0.71 X10(3)/MCL (ref 0–0.9)
EOSINOPHIL NFR BLD AUTO: 8.9 %
ERYTHROCYTE [DISTWIDTH] IN BLOOD BY AUTOMATED COUNT: 14.8 % (ref 11.5–17)
GFR SERPLBLD CREATININE-BSD FMLA CKD-EPI: 58 ML/MIN/1.73/M2
GLOBULIN SER-MCNC: 3.2 GM/DL (ref 2.4–3.5)
GLUCOSE SERPL-MCNC: 90 MG/DL (ref 82–115)
HCT VFR BLD AUTO: 35.2 % (ref 42–52)
HGB BLD-MCNC: 11.6 G/DL (ref 14–18)
IMM GRANULOCYTES # BLD AUTO: 0.07 X10(3)/MCL (ref 0–0.04)
IMM GRANULOCYTES NFR BLD AUTO: 0.9 %
LYMPHOCYTES # BLD AUTO: 1.41 X10(3)/MCL (ref 0.6–4.6)
LYMPHOCYTES NFR BLD AUTO: 17.7 %
MCH RBC QN AUTO: 30.9 PG (ref 27–31)
MCHC RBC AUTO-ENTMCNC: 33 G/DL (ref 33–36)
MCV RBC AUTO: 93.9 FL (ref 80–94)
MONOCYTES # BLD AUTO: 0.68 X10(3)/MCL (ref 0.1–1.3)
MONOCYTES NFR BLD AUTO: 8.5 %
NEUTROPHILS # BLD AUTO: 5.04 X10(3)/MCL (ref 2.1–9.2)
NEUTROPHILS NFR BLD AUTO: 63.4 %
NRBC BLD AUTO-RTO: 0 %
PLATELET # BLD AUTO: 215 X10(3)/MCL (ref 130–400)
PMV BLD AUTO: 10.5 FL (ref 7.4–10.4)
POTASSIUM SERPL-SCNC: 4 MMOL/L (ref 3.5–5.1)
PROT SERPL-MCNC: 6.4 GM/DL (ref 5.8–7.6)
RBC # BLD AUTO: 3.75 X10(6)/MCL (ref 4.7–6.1)
SODIUM SERPL-SCNC: 144 MMOL/L (ref 136–145)
WBC # BLD AUTO: 7.96 X10(3)/MCL (ref 4.5–11.5)

## 2024-07-07 PROCEDURE — 97530 THERAPEUTIC ACTIVITIES: CPT | Mod: CQ

## 2024-07-07 PROCEDURE — 21400001 HC TELEMETRY ROOM

## 2024-07-07 PROCEDURE — 63600175 PHARM REV CODE 636 W HCPCS: Performed by: INTERNAL MEDICINE

## 2024-07-07 PROCEDURE — 25000003 PHARM REV CODE 250: Performed by: INTERNAL MEDICINE

## 2024-07-07 PROCEDURE — 36415 COLL VENOUS BLD VENIPUNCTURE: CPT | Performed by: INTERNAL MEDICINE

## 2024-07-07 PROCEDURE — 85025 COMPLETE CBC W/AUTO DIFF WBC: CPT | Performed by: INTERNAL MEDICINE

## 2024-07-07 PROCEDURE — 27000221 HC OXYGEN, UP TO 24 HOURS

## 2024-07-07 PROCEDURE — S4991 NICOTINE PATCH NONLEGEND: HCPCS | Performed by: INTERNAL MEDICINE

## 2024-07-07 PROCEDURE — 80053 COMPREHEN METABOLIC PANEL: CPT | Performed by: INTERNAL MEDICINE

## 2024-07-07 RX ADMIN — MEMANTINE 10 MG: 5 TABLET ORAL at 08:07

## 2024-07-07 RX ADMIN — POLYETHYLENE GLYCOL 3350 17 G: 17 POWDER, FOR SOLUTION ORAL at 08:07

## 2024-07-07 RX ADMIN — HYDROCODONE BITARTRATE AND ACETAMINOPHEN 1 TABLET: 10; 325 TABLET ORAL at 03:07

## 2024-07-07 RX ADMIN — PIPERACILLIN SODIUM AND TAZOBACTAM SODIUM 4.5 G: 4; .5 INJECTION, POWDER, LYOPHILIZED, FOR SOLUTION INTRAVENOUS at 03:07

## 2024-07-07 RX ADMIN — PREGABALIN 75 MG: 25 CAPSULE ORAL at 08:07

## 2024-07-07 RX ADMIN — NICOTINE 1 PATCH: 21 PATCH, EXTENDED RELEASE TRANSDERMAL at 08:07

## 2024-07-07 RX ADMIN — PIPERACILLIN SODIUM AND TAZOBACTAM SODIUM 4.5 G: 4; .5 INJECTION, POWDER, LYOPHILIZED, FOR SOLUTION INTRAVENOUS at 05:07

## 2024-07-07 RX ADMIN — ACETAMINOPHEN 650 MG: 325 TABLET, FILM COATED ORAL at 10:07

## 2024-07-07 RX ADMIN — SERTRALINE HYDROCHLORIDE 75 MG: 50 TABLET ORAL at 06:07

## 2024-07-07 RX ADMIN — FAMOTIDINE 20 MG: 10 INJECTION, SOLUTION INTRAVENOUS at 08:07

## 2024-07-07 RX ADMIN — LISINOPRIL 5 MG: 5 TABLET ORAL at 08:07

## 2024-07-07 RX ADMIN — FAMOTIDINE 20 MG: 10 INJECTION, SOLUTION INTRAVENOUS at 09:07

## 2024-07-07 RX ADMIN — ATORVASTATIN CALCIUM 20 MG: 10 TABLET, FILM COATED ORAL at 08:07

## 2024-07-07 RX ADMIN — ASPIRIN 81 MG: 81 TABLET, COATED ORAL at 08:07

## 2024-07-07 RX ADMIN — SODIUM CHLORIDE, POTASSIUM CHLORIDE, SODIUM LACTATE AND CALCIUM CHLORIDE: 600; 310; 30; 20 INJECTION, SOLUTION INTRAVENOUS at 02:07

## 2024-07-07 RX ADMIN — HYDROCODONE BITARTRATE AND ACETAMINOPHEN 1 TABLET: 10; 325 TABLET ORAL at 08:07

## 2024-07-07 RX ADMIN — PIPERACILLIN SODIUM AND TAZOBACTAM SODIUM 4.5 G: 4; .5 INJECTION, POWDER, LYOPHILIZED, FOR SOLUTION INTRAVENOUS at 11:07

## 2024-07-07 NOTE — PT/OT/SLP PROGRESS
Physical Therapy Treatment    Patient Name:  Rojas Foster   MRN:  [id number]    Recommendations:     Discharge therapy intensity: Moderate Intensity Therapy   Discharge Equipment Recommendations: to be determined by next level of care  Barriers to discharge: Impaired mobility and Ongoing medical needs    Assessment:     Rojas Foster is a 79 y.o. male admitted with a medical diagnosis of pneumonitis, altered mental status. History of Alzheimer's disease.  He presents with the following impairments/functional limitations: weakness, impaired endurance, impaired self care skills, impaired functional mobility, gait instability, impaired balance, decreased lower extremity function, impaired cognition, decreased safety awareness, pain. Pt very confused and has difficulty following commands requiring max VC for attention to task.     Rehab Prognosis: Fair; patient would benefit from acute skilled PT services to address these deficits and reach maximum level of function.    Recent Surgery: * No surgery found *      Plan:     During this hospitalization, patient would benefit from acute PT services 5 x/week to address the identified rehab impairments via gait training, therapeutic activities, therapeutic exercises, neuromuscular re-education and progress toward the following goals:    Plan of Care Expires:  08/02/24    Subjective     Chief Complaint: none stated  Patient/Family Comments/goals: to go walk  Pain/Comfort:         Objective:     Communicated with nursing prior to session.  Patient found HOB elevated with pulse ox (continuous), telemetry, peripheral IV, PureWick upon PT entry to room.     General Precautions: Standard, fall  Orthopedic Precautions: N/A  Braces: N/A  Respiratory Status: Room air  Blood Pressure: NT    Functional Mobility:  Bed Mobility:     Supine to Sit: moderate assistance  Sit to Supine: maximal assistance and of 2 persons  Transfers:     Sit to Stand:  moderate assistance and of 2 persons  with rolling walker  Posterior/L lateral lean  Gait: attempted lateral & forward steps pt unable to   Balance:  Static sitting: Mod-CGA  Static standing: ModA - posterior lean noted - VC to correct, pt unable      Therapeutic Activities/Exercises:  Pt sat EOB, pt was soiled. Pt performed 2 sit to stands with standing trails of ~1-2 mins long for total A pericare and to don brief. Attempted lateral/forward stepping however pt unable to. Pt was laid back down.    Education:  Patient and spouse were provided with verbal education education regarding PT role/goals/POC, fall prevention, and safety awareness.  Additional teaching is warranted.     Patient left HOB elevated with all lines intact, call button in reach, nurse notified, and family present    GOALS:   Multidisciplinary Problems       Physical Therapy Goals          Problem: Physical Therapy    Goal Priority Disciplines Outcome Goal Variances Interventions   Physical Therapy Goal     PT, PT/OT Progressing     Description: Patient will improve functional independence by performin. Supine to Sit with minimal assist.- MET 24  Pt performs bed mobility SBA.  2. Sit to Supine with minimal assist.- MET 24  3. Sit to stand with rolling walker and moderate assist.- MET 24  Pt performs functional transfers SBA with RW.   4. Bed to chair transfer using squat pivot vs stand-step transfer with rolling walker and moderate assist. - MET 24  5. Pt ambulates with RW x 150ft with RW, SBA                           Time Tracking:     PT Received On: 24  PT Start Time: 1152     PT Stop Time: 1220  PT Total Time (min): 28 min     Billable Minutes: Therapeutic Activity 28    Treatment Type: Treatment  PT/PTA: PTA     Number of PTA visits since last PT visit: 2     2024

## 2024-07-07 NOTE — PROGRESS NOTES
[facility] - 5th Floor [facility] Medicine  Progress Note    Patient Name: Rojas Foster  MRN: [id number]  Patient Class: IP- Inpatient   Admission Date: 7/2/2024  Length of Stay: 5 days  Attending Physician: Aldo Dahl MD  Primary Care Provider: Vishal Correa MD        Subjective:     Principal Problem:Pneumonitis    Interval History:   Today's info : seen and examined, no acute events overnight. Continues to improve   Mri neg  Ua positive  Ecoli in urine  Afebrile  Remains on iv abx  Confusion improving    Rehab monday    Review of Systems   Constitutional:  Positive for fever.   HENT: Negative.     Eyes: Negative.    Respiratory:  Positive for shortness of breath.    Cardiovascular: Negative.    Gastrointestinal: Negative.    Endocrine: Negative.    Genitourinary: Negative.    Musculoskeletal: Negative.    Allergic/Immunologic: Negative.    Neurological:  Positive for weakness.   Psychiatric/Behavioral:  The patient is nervous/anxious.      Objective:     Vital Signs (Most Recent):  Temp: 98.5 °F (36.9 °C) (07/07/24 1107)  Pulse: 65 (07/07/24 1107)  Resp: 18 (07/07/24 0832)  BP: 132/82 (07/07/24 1107)  SpO2: 96 % (07/07/24 1107) Vital Signs (24h Range):  Temp:  [97.4 °F (36.3 °C)-98.6 °F (37 °C)] 98.5 °F (36.9 °C)  Pulse:  [51-70] 65  Resp:  [17-18] 18  SpO2:  [94 %-97 %] 96 %  BP: (108-155)/(70-95) 132/82     Weight: 96.9 kg (213 lb 11.2 oz)  Body mass index is 32.49 kg/m².    Intake/Output Summary (Last 24 hours) at 7/7/2024 1429  Last data filed at 7/7/2024 1300  Gross per 24 hour   Intake 620 ml   Output 2100 ml   Net -1480 ml      Physical Exam  Vitals reviewed.   Constitutional:       Appearance: Normal appearance.   HENT:      Head: Normocephalic and atraumatic.      Right Ear: Tympanic membrane and external ear normal.      Nose: Nose normal.      Mouth/Throat:      Mouth: Mucous membranes are moist.   Eyes:      Extraocular Movements: Extraocular movements  intact.      Pupils: Pupils are equal, round, and reactive to light.   Cardiovascular:      Rate and Rhythm: Normal rate and regular rhythm.      Pulses: Normal pulses.      Heart sounds: Normal heart sounds.   Pulmonary:      Effort: Pulmonary effort is normal.      Breath sounds: Normal breath sounds.   Abdominal:      General: Abdomen is flat. Bowel sounds are normal.      Palpations: Abdomen is soft.   Musculoskeletal:         General: Normal range of motion.      Cervical back: Normal range of motion and neck supple.   Skin:     General: Skin is warm and dry.   Neurological:      General: No focal deficit present.      Mental Status: He is alert and oriented to person, place, and time.   Psychiatric:         Mood and Affect: Mood normal.         Behavior: Behavior normal.           Overview/Hospital Course: stable    Significant Labs: All pertinent labs within the past 24 hours have been reviewed.  Lab Results   Component Value Date    WBC 7.96 07/07/2024    HGB 11.6 (L) 07/07/2024    HCT 35.2 (L) 07/07/2024    MCV 93.9 07/07/2024     07/07/2024         Recent Labs   Lab 07/07/24  0528      K 4.0   *   CO2 26   BUN 12.0   CREATININE 1.26*   GLUCOSE 90   CALCIUM 9.1       Significant Imaging: I have reviewed all pertinent imaging results/findings within the past 24 hours.    Assessment/Plan:      Active Diagnoses:    Diagnosis Date Noted POA    PRINCIPAL PROBLEM:  Pneumonitis [J98.4] 07/03/2024 Unknown      Problems Resolved During this Admission:     VTE Risk Mitigation (From admission, onward)           Ordered     IP VTE HIGH RISK PATIENT  Once         07/02/24 2005     Place sequential compression device  Until discontinued         07/02/24 2005                     Pneumonitis  Metabolic encephalopathy  Dementia  Htn  Hld  H/o tia  Arthritis  Acute cystitis     Plan :  Ivf  Iv abx  Follow cx  Labs in am  Gi and dvt ppx  Full code  Anticipated dc in 24 to 48 hrs    Aldo Dahl  MD  Department of Hospital Medicine   [facility] - 5th Floor Med Surg

## 2024-07-08 PROBLEM — J98.4 PNEUMONITIS: Status: RESOLVED | Noted: 2024-07-03 | Resolved: 2024-07-08

## 2024-07-08 PROCEDURE — 97530 THERAPEUTIC ACTIVITIES: CPT | Mod: CQ

## 2024-07-08 PROCEDURE — 27000221 HC OXYGEN, UP TO 24 HOURS

## 2024-07-08 PROCEDURE — 63600175 PHARM REV CODE 636 W HCPCS: Performed by: INTERNAL MEDICINE

## 2024-07-08 PROCEDURE — 21400001 HC TELEMETRY ROOM

## 2024-07-08 PROCEDURE — S4991 NICOTINE PATCH NONLEGEND: HCPCS | Performed by: INTERNAL MEDICINE

## 2024-07-08 PROCEDURE — 25000003 PHARM REV CODE 250: Performed by: INTERNAL MEDICINE

## 2024-07-08 PROCEDURE — 97116 GAIT TRAINING THERAPY: CPT | Mod: CQ

## 2024-07-08 RX ORDER — GUAIFENESIN 100 MG/5ML
100 SOLUTION ORAL EVERY 6 HOURS PRN
Status: DISCONTINUED | OUTPATIENT
Start: 2024-07-08 | End: 2024-07-08

## 2024-07-08 RX ORDER — CIPROFLOXACIN 500 MG/1
500 TABLET ORAL EVERY 12 HOURS
Status: DISCONTINUED | OUTPATIENT
Start: 2024-07-08 | End: 2024-07-09 | Stop reason: HOSPADM

## 2024-07-08 RX ORDER — CIPROFLOXACIN 500 MG/1
500 TABLET ORAL 2 TIMES DAILY
Qty: 10 TABLET | Refills: 0 | Status: ON HOLD | OUTPATIENT
Start: 2024-07-08

## 2024-07-08 RX ADMIN — MEMANTINE 10 MG: 5 TABLET ORAL at 10:07

## 2024-07-08 RX ADMIN — SODIUM CHLORIDE, POTASSIUM CHLORIDE, SODIUM LACTATE AND CALCIUM CHLORIDE: 600; 310; 30; 20 INJECTION, SOLUTION INTRAVENOUS at 06:07

## 2024-07-08 RX ADMIN — HYDROCODONE BITARTRATE AND ACETAMINOPHEN 1 TABLET: 10; 325 TABLET ORAL at 10:07

## 2024-07-08 RX ADMIN — ASPIRIN 81 MG: 81 TABLET, COATED ORAL at 10:07

## 2024-07-08 RX ADMIN — PREGABALIN 75 MG: 25 CAPSULE ORAL at 08:07

## 2024-07-08 RX ADMIN — HYDROCODONE BITARTRATE AND ACETAMINOPHEN 1 TABLET: 10; 325 TABLET ORAL at 08:07

## 2024-07-08 RX ADMIN — ATORVASTATIN CALCIUM 20 MG: 10 TABLET, FILM COATED ORAL at 10:07

## 2024-07-08 RX ADMIN — NICOTINE 1 PATCH: 21 PATCH, EXTENDED RELEASE TRANSDERMAL at 10:07

## 2024-07-08 RX ADMIN — CIPROFLOXACIN HYDROCHLORIDE 500 MG: 500 TABLET, FILM COATED ORAL at 08:07

## 2024-07-08 RX ADMIN — HYDROCODONE BITARTRATE AND ACETAMINOPHEN 1 TABLET: 10; 325 TABLET ORAL at 03:07

## 2024-07-08 RX ADMIN — PREGABALIN 75 MG: 25 CAPSULE ORAL at 10:07

## 2024-07-08 RX ADMIN — MEMANTINE 10 MG: 5 TABLET ORAL at 08:07

## 2024-07-08 RX ADMIN — LISINOPRIL 5 MG: 5 TABLET ORAL at 10:07

## 2024-07-08 RX ADMIN — PIPERACILLIN SODIUM AND TAZOBACTAM SODIUM 4.5 G: 4; .5 INJECTION, POWDER, LYOPHILIZED, FOR SOLUTION INTRAVENOUS at 06:07

## 2024-07-08 RX ADMIN — POLYETHYLENE GLYCOL 3350 17 G: 17 POWDER, FOR SOLUTION ORAL at 10:07

## 2024-07-08 NOTE — PROGRESS NOTES
[facility] - 5th Floor [facility] Medicine  Progress Note    Patient Name: Rojas Foster  MRN: [id number]  Patient Class: IP- Inpatient   Admission Date: 7/2/2024  Length of Stay: 6 days  Attending Physician: Aldo Dahl MD  Primary Care Provider: Vishal Correa MD        Subjective:     Principal Problem:Pneumonitis    Interval History:   Today's info : seen and examined, no acute events overnight. Continues to improve   Mri neg  Ua positive  Ecoli in urine  Afebrile  Remains on iv abx  Confusion improving    Pending placement - wife wants [facility] vs [facility]    Review of Systems   Constitutional:  Positive for fever.   HENT: Negative.     Eyes: Negative.    Respiratory:  Positive for shortness of breath.    Cardiovascular: Negative.    Gastrointestinal: Negative.    Endocrine: Negative.    Genitourinary: Negative.    Musculoskeletal: Negative.    Allergic/Immunologic: Negative.    Neurological:  Positive for weakness.   Psychiatric/Behavioral:  The patient is nervous/anxious.      Objective:     Vital Signs (Most Recent):  Temp: 98.2 °F (36.8 °C) (07/08/24 1100)  Pulse: 68 (07/08/24 1100)  Resp: 18 (07/08/24 1100)  BP: 121/77 (07/08/24 1100)  SpO2: (!) 94 % (07/08/24 1100) Vital Signs (24h Range):  Temp:  [97.5 °F (36.4 °C)-98.2 °F (36.8 °C)] 98.2 °F (36.8 °C)  Pulse:  [56-68] 68  Resp:  [18] 18  SpO2:  [94 %-97 %] 94 %  BP: (116-166)/(70-99) 121/77     Weight: 96.9 kg (213 lb 11.2 oz)  Body mass index is 32.49 kg/m².    Intake/Output Summary (Last 24 hours) at 7/8/2024 1122  Last data filed at 7/7/2024 1300  Gross per 24 hour   Intake 620 ml   Output 500 ml   Net 120 ml      Physical Exam  Vitals reviewed.   Constitutional:       Appearance: Normal appearance.   HENT:      Head: Normocephalic and atraumatic.      Right Ear: Tympanic membrane and external ear normal.      Nose: Nose normal.      Mouth/Throat:      Mouth: Mucous membranes are moist.   Eyes:      Extraocular  Movements: Extraocular movements intact.      Pupils: Pupils are equal, round, and reactive to light.   Cardiovascular:      Rate and Rhythm: Normal rate and regular rhythm.      Pulses: Normal pulses.      Heart sounds: Normal heart sounds.   Pulmonary:      Effort: Pulmonary effort is normal.      Breath sounds: Normal breath sounds.   Abdominal:      General: Abdomen is flat. Bowel sounds are normal.      Palpations: Abdomen is soft.   Musculoskeletal:         General: Normal range of motion.      Cervical back: Normal range of motion and neck supple.   Skin:     General: Skin is warm and dry.   Neurological:      General: No focal deficit present.      Mental Status: He is alert and oriented to person, place, and time.   Psychiatric:         Mood and Affect: Mood normal.         Behavior: Behavior normal.           Overview/Hospital Course: stable    Significant Labs: All pertinent labs within the past 24 hours have been reviewed.  Lab Results   Component Value Date    WBC 7.96 07/07/2024    HGB 11.6 (L) 07/07/2024    HCT 35.2 (L) 07/07/2024    MCV 93.9 07/07/2024     07/07/2024         Recent Labs   Lab 07/07/24  0528      K 4.0   *   CO2 26   BUN 12.0   CREATININE 1.26*   GLUCOSE 90   CALCIUM 9.1       Significant Imaging: I have reviewed all pertinent imaging results/findings within the past 24 hours.    Assessment/Plan:      Active Diagnoses:      Problems Resolved During this Admission:    Diagnosis Date Noted Date Resolved POA    PRINCIPAL PROBLEM:  Pneumonitis [J98.4] 07/03/2024 07/08/2024 Unknown     VTE Risk Mitigation (From admission, onward)           Ordered     IP VTE HIGH RISK PATIENT  Once         07/02/24 2005     Place sequential compression device  Until discontinued         07/02/24 2005                     Pneumonitis  Metabolic encephalopathy  Dementia  Htn  Hld  H/o tia  Arthritis  Acute cystitis     Plan :  Ivf  Iv abx  Follow cx  Labs in am  Gi and dvt ppx  Full  code  Anticipated dc in 24 to 48 hrs    Aldo Dahl MD  Department of Hospital Medicine   [facility] - 5th Floor Med Surg

## 2024-07-08 NOTE — DISCHARGE SUMMARY
[facility] - 5th Floor [facility] Medicine  Discharge Summary      Patient Name: Rojas Foster  MRN: [id number]  Admission Date: 7/2/2024  Hospital Length of Stay: 6 days  Discharge Date and Time:  07/08/2024 11:09 AM  Attending Physician: Aldo Dahl MD   Discharging Provider: Aldo Dahl MD  Discharge Provider Team: Networked reference to record PCT   Primary Care Provider: Vishal Correa MD        Dc diagnoses :     Pneumonitis  Metabolic encephalopathy  Dementia  Htn  Hld  H/o tia  Arthritis  Acute cystitis    * No surgery found *      Hospital Course: 79 /o male with history of htna nxiety alzeimers tia presented to the ed with complaints of worsening weakness confusion and cough and further reported to have exposed to covid and susbequent work up suggestive of fevers with encephalopathy and subsequentlya dmitted on iv abx and close monitoring   Mri negative  Sob improved  On room air  Tolerating cpap  Afebrile  Urine cx final  Off of v abx  Tolerating therapy  Dc to rehab for further care on oral abx and complex medical management    Consults:     Final Active Diagnoses:      Problems Resolved During this Admission:    Diagnosis Date Noted Date Resolved POA    PRINCIPAL PROBLEM:  Pneumonitis [J98.4] 07/03/2024 07/08/2024 Unknown      Discharged Condition: fair    Disposition: Rehab Facility    Follow Up:    Patient Instructions:      Reason for not Ordering Smoking Cessation Referral     Order Specific Question Answer Comments   Reason for not ordering: Patient refused      Reason for not Prescribing Nicotine Replacement     Order Specific Question Answer Comments   Reason for not Prescribing: Patient refused      Medications:  Reconciled Home Medications:      Medication List        START taking these medications      ciprofloxacin HCl 500 MG tablet  Commonly known as: CIPRO  Take 1 tablet (500 mg total) by mouth 2 (two) times daily.            CONTINUE taking  these medications      alendronate 35 MG tablet  Commonly known as: FOSAMAX  Take 35 mg by mouth every 7 days.     aspirin 81 MG EC tablet  Commonly known as: ECOTRIN  Take 81 mg by mouth once daily.     atorvastatin 20 MG tablet  Commonly known as: LIPITOR  Take 20 mg by mouth once daily.     ferrous sulfate Tab tablet  Commonly known as: FEOSOL  Take 1 tablet by mouth daily with breakfast.     HYDROcodone-acetaminophen  mg per tablet  Commonly known as: NORCO  Take 1 tablet by mouth 3 (three) times daily.     lisinopriL 5 MG tablet  Commonly known as: PRINIVIL,ZESTRIL  Take 5 mg by mouth.     memantine 10 MG Tab  Commonly known as: NAMENDA  Take 1 tablet (10 mg total) by mouth 2 (two) times daily.     multivitamin with minerals tablet  Take 1 tablet by mouth once daily.     pregabalin 75 MG capsule  Commonly known as: LYRICA  Take 75 mg by mouth.     sertraline 50 MG tablet  Commonly known as: ZOLOFT  Take 1.5 tablets (75 mg total) by mouth once daily.              Significant Diagnostic Studies: Labs: BMP:   Recent Labs   Lab 07/07/24  0528      K 4.0   *   CO2 26   BUN 12.0   CREATININE 1.26*   CALCIUM 9.1       Pending Diagnostic Studies:       None          Indwelling Lines/Drains at time of discharge:   Lines/Drains/Airways       None                   Time spent on the discharge of patient: 31 minutes         Aldo Dahl MD  Department of Hospital Medicine  [facility] - 5th Floor Med Surg

## 2024-07-08 NOTE — PLAN OF CARE
07/08/24 1039   Final Note   Assessment Type Final Discharge Note   Anticipated Discharge Disposition Rehab  ([facility])   Post-Acute Status   Post-Acute Authorization Placement   Post-Acute Placement Status Set-up Complete/Auth obtained   Coverage Medicare   Discharge Delays None known at this time

## 2024-07-08 NOTE — DISCHARGE SUMMARY
[facility] - 5th Floor [facility] Medicine  Discharge Summary      Patient Name: Rojas Foster  MRN: [id number]  Admission Date: 7/2/2024  Hospital Length of Stay: 6 days  Discharge Date and Time:  07/08/2024 11:09 AM  Attending Physician: Aldo Dahl MD   Discharging Provider: Aldo Dahl MD  Discharge Provider Team: Networked reference to record PCT   Primary Care Provider: Vishal Correa MD        Dc diagnoses :     Pneumonitis  Metabolic encephalopathy  Dementia  Htn  Hld  H/o tia  Arthritis  Acute cystitis    * No surgery found *      Hospital Course: 79 /o male with history of htna nxiety alzeimers tia presented to the ed with complaints of worsening weakness confusion and cough and further reported to have exposed to covid and susbequent work up suggestive of fevers with encephalopathy and subsequentlya dmitted on iv abx and close monitoring   Mri negative  Sob improved  On room air  Tolerating cpap  Afebrile  Urine cx final  Off of v abx  Tolerating therapy  Dc to rehab for further care on oral abx and complex medical management    Consults:     Final Active Diagnoses:      Problems Resolved During this Admission:    Diagnosis Date Noted Date Resolved POA    PRINCIPAL PROBLEM:  Pneumonitis [J98.4] 07/03/2024 07/08/2024 Unknown      Discharged Condition: fair    Disposition: Rehab Facility    Follow Up:    Patient Instructions:      Reason for not Ordering Smoking Cessation Referral     Order Specific Question Answer Comments   Reason for not ordering: Patient refused      Reason for not Prescribing Nicotine Replacement     Order Specific Question Answer Comments   Reason for not Prescribing: Patient refused      Medications:  Reconciled Home Medications:      Medication List        START taking these medications      ciprofloxacin HCl 500 MG tablet  Commonly known as: CIPRO  Take 1 tablet (500 mg total) by mouth 2 (two) times daily.            CONTINUE taking  these medications      alendronate 35 MG tablet  Commonly known as: FOSAMAX  Take 35 mg by mouth every 7 days.     aspirin 81 MG EC tablet  Commonly known as: ECOTRIN  Take 81 mg by mouth once daily.     atorvastatin 20 MG tablet  Commonly known as: LIPITOR  Take 20 mg by mouth once daily.     ferrous sulfate Tab tablet  Commonly known as: FEOSOL  Take 1 tablet by mouth daily with breakfast.     HYDROcodone-acetaminophen  mg per tablet  Commonly known as: NORCO  Take 1 tablet by mouth 3 (three) times daily.     lisinopriL 5 MG tablet  Commonly known as: PRINIVIL,ZESTRIL  Take 5 mg by mouth.     memantine 10 MG Tab  Commonly known as: NAMENDA  Take 1 tablet (10 mg total) by mouth 2 (two) times daily.     multivitamin with minerals tablet  Take 1 tablet by mouth once daily.     pregabalin 75 MG capsule  Commonly known as: LYRICA  Take 75 mg by mouth.     sertraline 50 MG tablet  Commonly known as: ZOLOFT  Take 1.5 tablets (75 mg total) by mouth once daily.              Significant Diagnostic Studies: Labs: BMP:   Recent Labs   Lab 07/07/24  0528      K 4.0   *   CO2 26   BUN 12.0   CREATININE 1.26*   CALCIUM 9.1       Pending Diagnostic Studies:       None          Indwelling Lines/Drains at time of discharge:   Lines/Drains/Airways       None                   Time spent on the discharge of patient: 31 minutes         Aldo Dahl MD  Department of Hospital Medicine  [facility] - 5th Floor Med Surg

## 2024-07-08 NOTE — PT/OT/SLP PROGRESS
Physical Therapy Treatment    Patient Name:  Rojas Foster   MRN:  [id number]    Recommendations:     Discharge Recommendations: Moderate Intensity Therapy  Discharge Equipment Recommendations: to be determined by next level of care  Barriers to discharge: Decreased caregiver support    Assessment:     Rojas Foster is a 79 y.o. male admitted with a medical diagnosis of Pneumonitis.  He presents with the following impairments/functional limitations: weakness, impaired endurance, impaired self care skills, impaired functional mobility, gait instability, impaired balance, impaired cognition, visual deficits, decreased upper extremity function, decreased coordination, decreased lower extremity function, decreased safety awareness, decreased ROM, impaired coordination, impaired fine motor, impaired cardiopulmonary response to activity, impaired joint extensibility, impaired muscle length .    Rehab Prognosis: Fair; patient would benefit from acute skilled PT services to address these deficits and reach maximum level of function.    Recent Surgery: * No surgery found *      Plan:     During this hospitalization, patient to be seen 5 x/week to address the identified rehab impairments via gait training, therapeutic activities, therapeutic exercises, neuromuscular re-education and progress toward the following goals:    Plan of Care Expires:  08/02/24    Subjective     Chief Complaint: pt very sleepy   Patient/Family Comments/goals: for pt to get better per family   Pain/Comfort:  Pain Rating 1: 0/10      Objective:     Communicated with nursing  prior to session.  Patient found HOB elevated with chair check, peripheral IV, PureWick, pulse ox (continuous), telemetry, Other (comments) (family present pt in bed) upon PT entry to room.     General Precautions: Standard, fall  Orthopedic Precautions: N/A  Braces: N/A  Respiratory Status: Room air     Functional Mobility:  Bed Mobility:     Supine to Sit: moderate assistance  "and vc for hand placement increased time use of bed rail (pt has in home environment )  Transfers:     Sit to Stand:  moderate assistance, maximal assistance, and secondary to backwards leaning  with rolling walker and vc for posture and hand placement pt taking increased time to begin activity   Bed to Chair: moderate assistance with  rolling walker  using  Stand Pivot, Step Transfer, and increased time slow transition with vc for safety to stay within rw for safety   Gait: pt ambulated use of rw mod a of one with sba of another with recliner close behind ,slow brody many vc for increasing lle step length and clearance 20ft then 25ft increased time required pt "freezing" at times vc for following commands to cont ambulating , at times B knees flexed vc for correction   Treatment & Education:  Static standing use of rw mod/min a for correction of backwards leaning     Patient left up in chair with call button in reach, family  present, and BLE elevated pt hermelindo tx pt requiring physical assist of one person for safety with ambulation use of rw with sba of another . Pt with poor safety awareness and recall.  ..    GOALS:   Multidisciplinary Problems       Physical Therapy Goals          Problem: Physical Therapy    Goal Priority Disciplines Outcome Goal Variances Interventions   Physical Therapy Goal     PT, PT/OT Progressing     Description: Patient will improve functional independence by performin. Supine to Sit with minimal assist.- MET 24  Pt performs bed mobility SBA.  2. Sit to Supine with minimal assist.- MET 24  3. Sit to stand with rolling walker and moderate assist.- MET 24  Pt performs functional transfers SBA with RW.   4. Bed to chair transfer using squat pivot vs stand-step transfer with rolling walker and moderate assist. - MET 24  5. Pt ambulates with RW x 150ft with RW, SBA                           Time Tracking:     PT Received On: 24  PT Start Time: 1430     PT Stop " Time: 1500  PT Total Time (min): 30 min     Billable Minutes: Gait Training 20min and Therapeutic Activity 10min    Treatment Type: Treatment  PT/PTA: PTA     Number of PTA visits since last PT visit: 3     07/08/2024

## 2024-07-08 NOTE — PLAN OF CARE
Patient wife notified [facility] that she would like to try [facility]. Referral sent via [facility]    Patient: Yissel Coley    Procedure(s):  Dilation and Curettage with ultrasound - Wound Class: II-Clean Contaminated    Diagnosis: Post menopausal bleeding  Diagnosis Additional Information: No value filed.    Anesthesia Type:   General, LMA     Note:  Airway :LMA  Patient transferred to:PACU  Comments: Transferred with LMA, removed per CRNA in recovery, adequate ventilation/oxygenation, report shared      Vitals: (Last set prior to Anesthesia Care Transfer)    CRNA VITALS  9/19/2017 0950 - 9/19/2017 1024      9/19/2017             NIBP: 140/81    NIBP Mean: 104                Electronically Signed By: RADHA Melgar CRNA  September 19, 2017  10:24 AM

## 2024-07-08 NOTE — PLAN OF CARE
Spoke to patient's wife she would like him to go to [facility] as first choice and [facility] as second choice has no plans to admit to [facility]. Referral sent via [facility]

## 2024-07-08 NOTE — PLAN OF CARE
Important Message from Medicare  Important Message from Medicare regarding Discharge Appeal Rights: Given to patient/caregiver, Explained to patient/caregiver     Time IMM was signed: [id number]

## 2024-07-09 VITALS
OXYGEN SATURATION: 96 % | BODY MASS INDEX: 32.39 KG/M2 | DIASTOLIC BLOOD PRESSURE: 81 MMHG | HEIGHT: 68 IN | WEIGHT: 213.69 LBS | TEMPERATURE: 99 F | HEART RATE: 72 BPM | RESPIRATION RATE: 18 BRPM | SYSTOLIC BLOOD PRESSURE: 143 MMHG

## 2024-07-09 PROBLEM — R41.82 ALTERED MENTAL STATUS: Status: ACTIVE | Noted: 2024-07-09

## 2024-07-09 PROCEDURE — 25000003 PHARM REV CODE 250: Performed by: INTERNAL MEDICINE

## 2024-07-09 PROCEDURE — S4991 NICOTINE PATCH NONLEGEND: HCPCS | Performed by: INTERNAL MEDICINE

## 2024-07-09 RX ORDER — SODIUM CHLORIDE, SODIUM LACTATE, POTASSIUM CHLORIDE, CALCIUM CHLORIDE 600; 310; 30; 20 MG/100ML; MG/100ML; MG/100ML; MG/100ML
INJECTION, SOLUTION INTRAVENOUS CONTINUOUS
OUTPATIENT
Start: 2024-07-09

## 2024-07-09 RX ORDER — LISINOPRIL 5 MG/1
5 TABLET ORAL DAILY
OUTPATIENT
Start: 2024-07-10

## 2024-07-09 RX ORDER — TALC
6 POWDER (GRAM) TOPICAL NIGHTLY PRN
OUTPATIENT
Start: 2024-07-09

## 2024-07-09 RX ORDER — POLYETHYLENE GLYCOL 3350 17 G/17G
17 POWDER, FOR SOLUTION ORAL DAILY
OUTPATIENT
Start: 2024-07-10

## 2024-07-09 RX ORDER — FAMOTIDINE 10 MG/ML
20 INJECTION INTRAVENOUS EVERY 12 HOURS
OUTPATIENT
Start: 2024-07-09

## 2024-07-09 RX ORDER — ASPIRIN 81 MG/1
81 TABLET ORAL DAILY
OUTPATIENT
Start: 2024-07-10

## 2024-07-09 RX ORDER — GUAIFENESIN AND DEXTROMETHORPHAN HYDROBROMIDE 10; 100 MG/5ML; MG/5ML
5 SYRUP ORAL EVERY 6 HOURS
Status: DISCONTINUED | OUTPATIENT
Start: 2024-07-09 | End: 2024-07-09 | Stop reason: HOSPADM

## 2024-07-09 RX ORDER — IBUPROFEN 200 MG
1 TABLET ORAL DAILY
OUTPATIENT
Start: 2024-07-10

## 2024-07-09 RX ORDER — ONDANSETRON HYDROCHLORIDE 2 MG/ML
4 INJECTION, SOLUTION INTRAVENOUS EVERY 8 HOURS PRN
OUTPATIENT
Start: 2024-07-09

## 2024-07-09 RX ORDER — ACETAMINOPHEN 325 MG/1
650 TABLET ORAL EVERY 8 HOURS PRN
OUTPATIENT
Start: 2024-07-09

## 2024-07-09 RX ORDER — ACETAMINOPHEN 650 MG/1
650 SUPPOSITORY RECTAL EVERY 4 HOURS PRN
OUTPATIENT
Start: 2024-07-09

## 2024-07-09 RX ORDER — ATORVASTATIN CALCIUM 10 MG/1
20 TABLET, FILM COATED ORAL DAILY
OUTPATIENT
Start: 2024-07-10

## 2024-07-09 RX ORDER — PROCHLORPERAZINE EDISYLATE 5 MG/ML
5 INJECTION INTRAMUSCULAR; INTRAVENOUS EVERY 6 HOURS PRN
OUTPATIENT
Start: 2024-07-09

## 2024-07-09 RX ORDER — MORPHINE SULFATE 4 MG/ML
4 INJECTION, SOLUTION INTRAMUSCULAR; INTRAVENOUS EVERY 4 HOURS PRN
OUTPATIENT
Start: 2024-07-09

## 2024-07-09 RX ORDER — MORPHINE SULFATE 4 MG/ML
2 INJECTION, SOLUTION INTRAMUSCULAR; INTRAVENOUS EVERY 4 HOURS PRN
OUTPATIENT
Start: 2024-07-09

## 2024-07-09 RX ORDER — MEMANTINE HYDROCHLORIDE 5 MG/1
10 TABLET ORAL 2 TIMES DAILY
OUTPATIENT
Start: 2024-07-09

## 2024-07-09 RX ORDER — GUAIFENESIN AND DEXTROMETHORPHAN HYDROBROMIDE 10; 100 MG/5ML; MG/5ML
5 SYRUP ORAL EVERY 6 HOURS
OUTPATIENT
Start: 2024-07-09

## 2024-07-09 RX ORDER — CIPROFLOXACIN 500 MG/1
500 TABLET ORAL EVERY 12 HOURS
OUTPATIENT
Start: 2024-07-09

## 2024-07-09 RX ORDER — HYDROCODONE BITARTRATE AND ACETAMINOPHEN 10; 325 MG/1; MG/1
1 TABLET ORAL 3 TIMES DAILY
OUTPATIENT
Start: 2024-07-09

## 2024-07-09 RX ADMIN — POLYETHYLENE GLYCOL 3350 17 G: 17 POWDER, FOR SOLUTION ORAL at 09:07

## 2024-07-09 RX ADMIN — SERTRALINE HYDROCHLORIDE 75 MG: 50 TABLET ORAL at 09:07

## 2024-07-09 RX ADMIN — LISINOPRIL 5 MG: 5 TABLET ORAL at 09:07

## 2024-07-09 RX ADMIN — ASPIRIN 81 MG: 81 TABLET, COATED ORAL at 09:07

## 2024-07-09 RX ADMIN — CIPROFLOXACIN HYDROCHLORIDE 500 MG: 500 TABLET, FILM COATED ORAL at 09:07

## 2024-07-09 RX ADMIN — ATORVASTATIN CALCIUM 20 MG: 10 TABLET, FILM COATED ORAL at 09:07

## 2024-07-09 RX ADMIN — NICOTINE 1 PATCH: 21 PATCH, EXTENDED RELEASE TRANSDERMAL at 09:07

## 2024-07-09 RX ADMIN — MEMANTINE 10 MG: 5 TABLET ORAL at 09:07

## 2024-07-09 RX ADMIN — GUAIFENESIN AND DEXTROMETHORPHAN 5 ML: 100; 10 SYRUP ORAL at 12:07

## 2024-07-09 RX ADMIN — PREGABALIN 75 MG: 25 CAPSULE ORAL at 09:07

## 2024-07-09 NOTE — PROGRESS NOTES
Inpatient Nutrition Evaluation    Admit Date: 7/2/2024   Total duration of encounter: 7 days   Patient Age: 79 y.o.    Nutrition Recommendation/Prescription     Continue Cardiac diet as tolerated    Nutrition Assessment     Chart Review    Reason Seen: length of stay    Malnutrition Screening Tool Results   Have you recently lost weight without trying?: No  Have you been eating poorly because of a decreased appetite?: No   MST Score: 0   Diagnosis:  Pneumonitis  Metabolic encephalopathy  Dementia  Htn  Hld  H/o tia  Arthritis  Acute cystitis    Relevant Medical History: Alxheimer's disease, Anxiety, Arthritis, HTN    Scheduled Medications:  aspirin, 81 mg, Daily  atorvastatin, 20 mg, Daily  ciprofloxacin HCl, 500 mg, Q12H  dextromethorphan-guaiFENesin  mg/5 ml, 5 mL, Q6H  famotidine (PF), 20 mg, Q12H  HYDROcodone-acetaminophen, 1 tablet, TID  lisinopriL, 5 mg, Daily  memantine, 10 mg, BID  nicotine, 1 patch, Daily  polyethylene glycol, 17 g, Daily  pregabalin, 75 mg, BID  sertraline, 75 mg, Daily    Continuous Infusions:  lactated ringers, Last Rate: 125 mL/hr at 07/08/24 0628    PRN Medications:   Current Facility-Administered Medications:     acetaminophen, 650 mg, Rectal, Q4H PRN    acetaminophen, 650 mg, Oral, Q8H PRN    lorazepam, 1 mg, Intravenous, Q4H PRN    melatonin, 6 mg, Oral, Nightly PRN    morphine, 2 mg, Intravenous, Q4H PRN    morphine, 4 mg, Intravenous, Q4H PRN    ondansetron, 4 mg, Intravenous, Q8H PRN    prochlorperazine, 5 mg, Intravenous, Q6H PRN    Recent Labs   Lab 07/02/24  1510 07/03/24  0506 07/04/24  0453 07/06/24  0503 07/07/24  0528    139 139 145 144   K 4.3 4.2 3.6 3.4* 4.0   CALCIUM 9.6 8.2* 8.1* 8.8 9.1   MG 1.90  --   --   --   --    CO2 22* 21* 24 23 26   BUN 20.2 19.0 17.0 14.0 12.0   CREATININE 1.30* 1.29* 1.38* 1.22* 1.26*   EGFRNORACEVR 56 57 52 >60 58   GLUCOSE 100 116* 117* 84 90   BILITOT 0.6 0.7 0.9 0.4 0.6   ALKPHOS 95 73 70 60 67   ALT 11 9 8 11 14   AST 18 17  "18 18 20   ALBUMIN 4.1 3.2* 3.0* 2.9* 3.2*   WBC 11.07 10.69 10.45 7.50 7.96   HGB 12.1* 10.1* 9.9* 10.8* 11.6*   HCT 37.0* 31.6* 30.4* 33.7* 35.2*     Nutrition Orders:  Diet Heart Healthy      Appetite/Oral Intake: good/% of meals  Factors Affecting Nutritional Intake: none identified  Food/Mormon/Cultural Preferences: none reported  Food Allergies: no known food allergies  Last Bowel Movement: 07/07/24  Wound(s):  intact    Comments    7/9/24: Pt eating lunch at time of visit. Wife feeding patient lobo. Stated good intake noted. No recent wt loss noted.     Anthropometrics    Height: 5' 8" (172.7 cm), Height Method: Estimated  Last Weight: 96.9 kg (213 lb 11.2 oz) (07/02/24 2245), Weight Method: Bed Scale  BMI (Calculated): 32.5  BMI Classification: obese grade I (BMI 30-34.9)        Ideal Body Weight (IBW), Male: 154 lb     % Ideal Body Weight, Male (lb): 138.77 %                          Usual Weight Provided By: EMR weight history    Wt Readings from Last 5 Encounters:   07/02/24 96.9 kg (213 lb 11.2 oz)   07/01/24 96.2 kg (212 lb)   04/30/24 96.2 kg (212 lb)   03/20/24 96.2 kg (212 lb)   12/14/23 95.3 kg (210 lb)     Weight Change(s) Since Admission: .  Wt Readings from Last 1 Encounters:   07/02/24 2245 96.9 kg (213 lb 11.2 oz)   07/02/24 1419 90.7 kg (200 lb)   Admit Weight: 90.7 kg (200 lb) (07/02/24 1419), Weight Method: Estimated    Patient Education     Not applicable.    Nutrition Goals & Monitoring     Dietitian will monitor: food and beverage intake    Nutrition Risk/Follow-Up: low (follow-up in 5-7 days)  Patients assigned 'low nutrition risk' status do not qualify for a full nutritional assessment but will be monitored and re-evaluated in a 5-7 day time period. Please consult if re-evaluation needed sooner.   "

## 2024-07-09 NOTE — PRE ADMISSION SCREENING
"[facility]    Pre-Admission Patient Screening                    Pre-Screen type:  SNF:  Reason for Admission:    IV fluids  therapy    SNF Admission Criteria:    Primary: Rehab Services     Actively treated hospital diagnosis/diagnoses: Other: see below  Pneumonitis  Metabolic encephalopathy  Dementia  Htn  Hld  H/o tia  Arthritis  Acute cystitis  Facility Status: Accept     Referring Physician:  Nabila    Admitting Physician:  Aldo Dahl MD    Primary Care Physician:  Vishal Correa MD    History         Patient Active Problem List    Diagnosis Date Noted    Unspecified cord compression 07/01/2024    Dementia in other diseases classified elsewhere, unspecified severity, with anxiety 07/01/2024    Risk for falls 03/20/2024    Leg weakness, bilateral 03/20/2024    KELSEY on CPAP 12/13/2023    Paronychia of great toe, right 09/04/2023    Reduced mobility 08/25/2022    Chronic bilateral low back pain 08/25/2022    Anxiety 08/25/2022    Hyperlipidemia 08/25/2022    Alzheimer's disease 08/25/2022    Amnesia 08/25/2022    Chronic back pain 08/25/2022    Obesity 08/25/2022    Hypertension 08/25/2022    Primary hypertension 08/25/2022    HTN (hypertension) 08/23/2022    Neuropathic pain 11/28/2021         Previous Specialties/Consulted physicians:      N/A:       Past and Current Medical History    Past Medical History:   Diagnosis Date    Alzheimer's disease, unspecified (CODE)     Anxiety disorder, unspecified     Arthritis     HTN (hypertension)            History of Present Illness       Altered Mental Status        Hx of Alzheimer's. Pt to ED with complaints of increased confusion per wife since 0400. Baseline GCS 14, GCS 12 at this time. Unable to complete FAST exam D/T pt not following commands. No slurred speech noted. Pt not answering questions, just responding to voice by saying, "yeh."         HPI: 79 /o male with history of htna nxiety alzeimers tia presented to the ed with " complaints of worsening weakness confusion and cough and further reported to have exposed to covid and susbequent work up suggestive of fevers with encephalopathy and subsequentlya dmitted on iv abx and close monitoring    Interval History:   Today's info : seen and examined, no acute events overnight. Continues to improve   Mri neg  Ua positive  Ecoli in urine  Afebrile  Remains on iv abx  Confusion improving    Patient Traveled outside of the [place] in the last 3 months? not applicable     Patient discharged from this LTAC to SNF within the last 45 days? no    Patient discharged from this LTAC to Rehab within the last 27 days? no    Prior residence: home    Prior Post-Acute Services: N/A     Allergies:   Review of patient's allergies indicates:   Allergen Reactions    Codeine Other (See Comments)     Other reaction(s): Confusion       Has patient received the current influenza vaccine (Oct 1 - March 31)? Unknown     Has patient received PPD skin test prior to admit? N/A     Code Status: Full Code    Orientation: Time, Place, and Person    Speech: normal     Vital Signs:   Vital Signs (Most Recent):  Temp: 98.2 °F (36.8 °C) (07/08/24 1100)  Pulse: 68 (07/08/24 1100)  Resp: 18 (07/08/24 1100)  BP: 121/77 (07/08/24 1100)  SpO2: (!) 94 % (07/08/24 1100) Vital Signs (24h Range):  Temp:  [97.5 °F (36.4 °C)-98.2 °F (36.8 °C)] 98.2 °F (36.8 °C)  Pulse:  [56-68] 68  Resp:  [18] 18  SpO2:  [94 %-97 %] 94 %  BP: (116-166)/(70-99) 121/77       Date     Blood Pressure     Pulse     Respiratory Rate     O2 Saturation     Temperature         Bowel/Bladder: incontinent of bladder and continent of bowel  Bowel/Bladder Appliance: external urinary catheter    Dialysis: N/A         CBGs/Accuchecks: No     Precautions: Fall    Restraints: No     Isolation Precautions: N/A       Facility-Administered Medications as of 7/9/2024   Medication Dose Route Frequency Provider Last Rate Last Admin    [COMPLETED] acetaminophen 1,000 mg/100 mL (10  mg/mL) injection 1,000 mg  1,000 mg Intravenous Once Luis Manuel Bernal MD   Stopped at 24 1522    [COMPLETED] acetaminophen 1,000 mg/100 mL (10 mg/mL) injection 1,000 mg  1,000 mg Intravenous Once Aldo Dahl MD   Stopped at 24 0314    acetaminophen suppository 650 mg  650 mg Rectal Q4H PRN Aldo Dahl MD   650 mg at 24 0045    acetaminophen tablet 650 mg  650 mg Oral Q8H PRN Aldo Dahl MD   650 mg at 24 1016    aspirin EC tablet 81 mg  81 mg Oral Daily Aldo Dahl MD   81 mg at 24 1039    atorvastatin tablet 20 mg  20 mg Oral Daily Aldo Dahl MD   20 mg at 24 1039    ciprofloxacin HCl tablet 500 mg  500 mg Oral Q12H Aldo Dahl MD   500 mg at 24    dextromethorphan-guaiFENesin  mg/5 ml liquid 5 mL  5 mL Oral Q6H Saurabh Dyer MD        famotidine (PF) injection 20 mg  20 mg Intravenous Q12H Aldo Dahl MD   20 mg at 24    [COMPLETED] gadobenate dimeglumine (MULTIHANCE) injection 20 mL  20 mL Intravenous ONCE PRN Aldo Dahl MD   20 mL at 24 1732    HYDROcodone-acetaminophen  mg per tablet 1 tablet  1 tablet Oral TID Aldo Dahl MD   1 tablet at 24 204    [COMPLETED] iohexoL (OMNIPAQUE 350) injection 100 mL  100 mL Intravenous ONCE PRN Luis Manuel Bernal MD   100 mL at 24 1848    [COMPLETED] ketorolac injection 15 mg  15 mg Intravenous ED 1 Time Luis Manuel Bernal MD   15 mg at 24 1724    [] ketorolac injection 15 mg  15 mg Intravenous ED 1 Time Evelina Samson ANP        [COMPLETED] ketorolac injection 15 mg  15 mg Intravenous Once Evelina Samson ANP   15 mg at 24 0114    lactated ringers infusion   Intravenous Continuous Aldo Dahl  mL/hr at 24 0628 New Bag at 24 0628    lisinopriL tablet 5 mg  5 mg Oral Daily Aldo Dahl MD   5 mg at 24 1039    LORazepam (ATIVAN)  injection 1 mg  1 mg Intravenous Q4H PRN Aldo Dahl MD        melatonin tablet 6 mg  6 mg Oral Nightly PRN Aldo Dahl MD        memantine tablet 10 mg  10 mg Oral BID Aldo Dahl MD   10 mg at 07/08/24 2041    morphine injection 2 mg  2 mg Intravenous Q4H PRN Aldo Dahl MD        morphine injection 4 mg  4 mg Intravenous Q4H PRN Aldo Dahl MD        nicotine 21 mg/24 hr 1 patch  1 patch Transdermal Daily Aldo Dahl MD   1 patch at 07/08/24 1039    ondansetron injection 4 mg  4 mg Intravenous Q8H PRN Aldo Dahl MD   4 mg at 07/04/24 1013    [COMPLETED] piperacillin-tazobactam (ZOSYN) 4.5 g in D5W 100 mL IVPB (MB+)  4.5 g Intravenous ED 1 Time Luis Manuel Bernal MD   Stopped at 07/02/24 1839    polyethylene glycol packet 17 g  17 g Oral Daily Aldo Dahl MD   17 g at 07/08/24 1039    pregabalin capsule 75 mg  75 mg Oral BID Aldo Dahl MD   75 mg at 07/08/24 2041    prochlorperazine injection Soln 5 mg  5 mg Intravenous Q6H PRN Aldo Dahl MD        sertraline tablet 75 mg  75 mg Oral Daily Aldo Dahl MD   75 mg at 07/07/24 1817    [COMPLETED] sodium chloride 0.9% bolus 1,000 mL 1,000 mL  1,000 mL Intravenous ED 1 Time Luis Manuel Bernal MD   Stopped at 07/02/24 1606    [COMPLETED] sodium chloride 0.9% bolus 1,000 mL 1,000 mL  1,000 mL Intravenous ED 1 Time Luis Manuel Bernal MD   Stopped at 07/02/24 1745     Outpatient Medications as of 7/9/2024   Medication Sig Dispense Refill    alendronate (FOSAMAX) 35 MG tablet Take 35 mg by mouth every 7 days.      aspirin (ECOTRIN) 81 MG EC tablet Take 81 mg by mouth once daily.      atorvastatin (LIPITOR) 20 MG tablet Take 20 mg by mouth once daily.      ferrous sulfate (FEOSOL) Tab tablet Take 1 tablet by mouth daily with breakfast.      HYDROcodone-acetaminophen (NORCO)  mg per tablet Take 1 tablet by mouth 3 (three) times daily.      lisinopriL (PRINIVIL,ZESTRIL)  "5 MG tablet Take 5 mg by mouth.      memantine (NAMENDA) 10 MG Tab Take 1 tablet (10 mg total) by mouth 2 (two) times daily. 180 tablet 4    multivitamin with minerals tablet Take 1 tablet by mouth once daily.      pregabalin (LYRICA) 75 MG capsule Take 75 mg by mouth.      sertraline (ZOLOFT) 50 MG tablet Take 1.5 tablets (75 mg total) by mouth once daily. 135 tablet 4    ciprofloxacin HCl (CIPRO) 500 MG tablet Take 1 tablet (500 mg total) by mouth 2 (two) times daily. 10 tablet 0        Cardiovascular:    Cardiovascular Review: Within definable limits (WDL)    Telemetry: Yes    Rhythm: N/A    AICD: No      Respiratory:    Oxygen: N/A    CPT/Frequency: N/A    Incentive Spirometer/Frequency: N/A    CPAP/Settings: N/A    BiPAP/Settings: N/A    Oxygen Saturation:  90's    Suction Frequency: N/A      Vent Settings:   Mode:   Rate:   TV:   FIO2:   PEEP:   PS:   iTime:    Trial/HS Settings:   Mode:   Rate:   TV:   FIO2:   PEEP:   PS:       Nutrition:      Ht Readings from Last 3 Encounters:   07/02/24 5' 8" (1.727 m)   07/01/24 5' 8" (1.727 m)   04/30/24 5' 8" (1.727 m)     Wt Readings from Last 1 Encounters:   07/02/24 2245 96.9 kg (213 lb 11.2 oz)   07/02/24 1419 90.7 kg (200 lb)       Feeding Status:   Current Diet: heart healthy   Supplements:N/A   Tube Feeding: N/A   Flushes: N/A      Integumentary:    Integumentary: Within definable limits (WDL)              Musculoskeletal:    Transfer assist: Moderate Assistance    Weight Bearing Status: Full    Comments:  see below    Virgie Spaulding PTA   Physical Therapy Assistant  Specialty: Physical Therapy     PT/OT/SLP Progress     Signed     Creation Time: 7/8/2024  3:56 PM       Physical Therapy Treatment     Patient Name:  Rojas Foster   MRN:  [id number]     Recommendations:      Discharge Recommendations: Moderate Intensity Therapy  Discharge Equipment Recommendations: to be determined by next level of care  Barriers to discharge: Decreased caregiver support   "   Assessment:      Rojas Foster is a 79 y.o. male admitted with a medical diagnosis of Pneumonitis.  He presents with the following impairments/functional limitations: weakness, impaired endurance, impaired self care skills, impaired functional mobility, gait instability, impaired balance, impaired cognition, visual deficits, decreased upper extremity function, decreased coordination, decreased lower extremity function, decreased safety awareness, decreased ROM, impaired coordination, impaired fine motor, impaired cardiopulmonary response to activity, impaired joint extensibility, impaired muscle length .     Rehab Prognosis: Fair; patient would benefit from acute skilled PT services to address these deficits and reach maximum level of function.    Recent Surgery: * No surgery found *       Plan:      During this hospitalization, patient to be seen 5 x/week to address the identified rehab impairments via gait training, therapeutic activities, therapeutic exercises, neuromuscular re-education and progress toward the following goals:     Plan of Care Expires:  08/02/24     Subjective      Chief Complaint: pt very sleepy   Patient/Family Comments/goals: for pt to get better per family   Pain/Comfort:  Pain Rating 1: 0/10        Objective:      Communicated with nursing  prior to session.  Patient found HOB elevated with chair check, peripheral IV, PureWick, pulse ox (continuous), telemetry, Other (comments) (family present pt in bed) upon PT entry to room.      General Precautions: Standard, fall  Orthopedic Precautions: N/A  Braces: N/A  Respiratory Status: Room air     Functional Mobility:  Bed Mobility:     Supine to Sit: moderate assistance and vc for hand placement increased time use of bed rail (pt has in home environment )  Transfers:     Sit to Stand:  moderate assistance, maximal assistance, and secondary to backwards leaning  with rolling walker and vc for posture and hand placement pt taking increased  "time to begin activity   Bed to Chair: moderate assistance with  rolling walker  using  Stand Pivot, Step Transfer, and increased time slow transition with vc for safety to stay within rw for safety   Gait: pt ambulated use of rw mod a of one with sba of another with recliner close behind ,slow brody many vc for increasing lle step length and clearance 20ft then 25ft increased time required pt "freezing" at times vc for following commands to cont ambulating , at times B knees flexed vc for correction   Treatment & Education:  Static standing use of rw mod/min a for correction of backwards leaning      Patient left up in chair with call button in reach, family  present, and BLE elevated pt hermelindo tx pt requiring physical assist of one person for safety with ambulation use of rw with sba of another . Pt with poor safety awareness and recall.  ..               ADL Assist: Activity did not occur    Special Equipment: walker    Radiology:    Radiology (Last 168 hours)               07/09 0754 Cardiac monitoring strips     07/09 0416 Cardiac monitoring strips     07/09 0122 Cardiac monitoring strips     07/08 2010 Cardiac monitoring strips     07/08 1601 Cardiac monitoring strips     07/08 0413 Cardiac monitoring strips     07/08 0002 Cardiac monitoring strips     07/07 1940 Cardiac monitoring strips     07/07 1511 Cardiac monitoring strips     07/07 1510 Cardiac monitoring strips     07/07 1111 Cardiac monitoring strips     07/07 0711 Cardiac monitoring strips     07/07 0520 Cardiac monitoring strips     07/06 1348 Cardiac monitoring strips     07/06 1019 Cardiac monitoring strips     07/06 0550 Cardiac monitoring strips     07/06 0444 Cardiac monitoring strips     07/06 0435 Cardiac monitoring strips     07/05 2346 Cardiac monitoring strips     07/05 1502 Cardiac monitoring strips     07/05 1113 Cardiac monitoring strips     07/05 0708 Cardiac monitoring strips     07/05 0708 Cardiac monitoring strips     07/05 0433 " Cardiac monitoring strips     07/04 2247 Cardiac monitoring strips     07/04 1731 MRI Brain W WO Contrast       07/04 1608 Cardiac monitoring strips     07/04 1212 Cardiac monitoring strips     07/04 0817 Cardiac monitoring strips     07/04 0507 Cardiac monitoring strips     07/04 0507 Cardiac monitoring strips     07/04 0138 X-Ray Chest AP Portable       07/03 2105 Cardiac monitoring strips     07/03 1525 Cardiac monitoring strips     07/03 0712 Cardiac monitoring strips     07/03 0319 Cardiac monitoring strips     07/02 2320 Cardiac monitoring strips     07/02 1846 CT Chest Abdomen Pelvis With IV Contrast (XPD) NO Oral Contrast       07/02 1608 CT Head Without Contrast       07/02 1503 X-Ray Chest AP Portable                MRI Brain W WO Contrast  Narrative: EXAMINATION:  MRI BRAIN W WO CONTRAST    CLINICAL HISTORY:  Stroke, follow up;    TECHNIQUE:  Multiplanar, multisequence MR images of the brain were obtained with and without administration of intravenous contrast.    COMPARISON:  CT head dated 07/02/2024    FINDINGS:  There is no restricted diffusion, hemorrhage or edema.  Moderate patchy and confluent T2/FLAIR hyperintensities in the subcortical and periventricular white matter likely represent chronic microvascular ischemic changes.  There is no abnormal parenchymal or leptomeningeal enhancement.    There is no mass effect or midline shift.  The basal cisterns are patent.  There is moderate diffuse parenchymal volume loss.  There is no hydrocephalus or abnormal extra-axial fluid collection.  The major intracranial flow voids are patent.  There is trace scattered paranasal sinus mucosal thickening.  Impression: 1. No acute intracranial abnormality.  2. Moderate chronic microvascular ischemic changes.    Electronically signed by: Vi Leach  Date:    07/05/2024  Time:    09:21      Lab/Cultures:    Blood Urea Nitrogen   Date Value Ref Range Status   07/07/2024 12.0 8.4 - 25.7 mg/dL Final   07/06/2024  "14.0 8.4 - 25.7 mg/dL Final     Creatinine   Date Value Ref Range Status   07/07/2024 1.26 (H) 0.73 - 1.18 mg/dL Final   07/06/2024 1.22 (H) 0.73 - 1.18 mg/dL Final     WBC   Date Value Ref Range Status   07/07/2024 7.96 4.50 - 11.50 x10(3)/mcL Final   07/06/2024 7.50 4.50 - 11.50 x10(3)/mcL Final      Blood Culture   Date Value Ref Range Status   07/02/2024 No Growth at 5 days  Final   07/02/2024 No Growth at 5 days  Final     Urine Culture   Date Value Ref Range Status   07/02/2024 >/= 100,000 colonies/ml Escherichia coli (A)  Final     No results for input(s): "PH", "PCO2", "PO2", "HCO3", "POCSATURATED", "BE" in the last 72 hours.       "

## 2024-11-02 NOTE — PLAN OF CARE
Problem: Adult Inpatient Plan of Care  Goal: Plan of Care Review  Outcome: Progressing  Goal: Patient-Specific Goal (Individualized)  Outcome: Progressing  Goal: Absence of Hospital-Acquired Illness or Injury  Outcome: Progressing  Goal: Optimal Comfort and Wellbeing  Outcome: Progressing  Goal: Readiness for Transition of Care  Outcome: Progressing     Problem: Skin Injury Risk Increased  Goal: Skin Health and Integrity  Outcome: Progressing      Yes

## 2024-11-26 DIAGNOSIS — R10.9 STOMACH ACHE: Primary | ICD-10-CM

## 2024-11-27 ENCOUNTER — HOSPITAL ENCOUNTER (OUTPATIENT)
Dept: RADIOLOGY | Facility: HOSPITAL | Age: 79
Discharge: HOME | End: 2024-11-27
Attending: INTERNAL MEDICINE
Payer: MEDICARE

## 2024-11-27 DIAGNOSIS — R10.9 STOMACH ACHE: ICD-10-CM

## 2024-11-27 PROCEDURE — 76700 US EXAM ABDOM COMPLETE: CPT | Mod: TC

## 2024-12-08 ENCOUNTER — HOSPITAL ENCOUNTER (INPATIENT)
Facility: HOSPITAL | Age: 79
LOS: 3 days | Discharge: NURSING, REHAB OR LONG-TERM CARE | DRG: 689 | End: 2024-12-12
Attending: STUDENT IN AN ORGANIZED HEALTH CARE EDUCATION/TRAINING PROGRAM | Admitting: INTERNAL MEDICINE
Payer: MEDICARE

## 2024-12-08 DIAGNOSIS — R62.7 FAILURE TO THRIVE IN ADULT: ICD-10-CM

## 2024-12-08 DIAGNOSIS — R41.82 AMS (ALTERED MENTAL STATUS): ICD-10-CM

## 2024-12-08 DIAGNOSIS — G93.41 ENCEPHALOPATHY, METABOLIC: Primary | ICD-10-CM

## 2024-12-08 LAB
ALBUMIN SERPL-MCNC: 3.9 G/DL (ref 3.4–4.8)
ALBUMIN/GLOB SERPL: 1 RATIO (ref 1.1–2)
ALP SERPL-CCNC: 110 UNIT/L (ref 40–150)
ALT SERPL-CCNC: 14 UNIT/L (ref 0–55)
ANION GAP SERPL CALC-SCNC: 10 MEQ/L
AST SERPL-CCNC: 20 UNIT/L (ref 5–34)
BACTERIA #/AREA URNS AUTO: ABNORMAL /HPF
BASOPHILS # BLD AUTO: 0.04 X10(3)/MCL
BASOPHILS NFR BLD AUTO: 0.7 %
BILIRUB SERPL-MCNC: 0.5 MG/DL
BILIRUB UR QL STRIP.AUTO: NEGATIVE
BUN SERPL-MCNC: 21 MG/DL (ref 8.4–25.7)
CALCIUM SERPL-MCNC: 9.2 MG/DL (ref 8.8–10)
CHLORIDE SERPL-SCNC: 105 MMOL/L (ref 98–107)
CLARITY UR: CLEAR
CO2 SERPL-SCNC: 25 MMOL/L (ref 23–31)
COLOR UR AUTO: YELLOW
CREAT SERPL-MCNC: 1.34 MG/DL (ref 0.72–1.25)
CREAT/UREA NIT SERPL: 16
EOSINOPHIL # BLD AUTO: 0.38 X10(3)/MCL (ref 0–0.9)
EOSINOPHIL NFR BLD AUTO: 6.3 %
ERYTHROCYTE [DISTWIDTH] IN BLOOD BY AUTOMATED COUNT: 15.2 % (ref 11.5–17)
GFR SERPLBLD CREATININE-BSD FMLA CKD-EPI: 54 ML/MIN/1.73/M2
GLOBULIN SER-MCNC: 3.8 GM/DL (ref 2.4–3.5)
GLUCOSE SERPL-MCNC: 118 MG/DL (ref 82–115)
GLUCOSE UR QL STRIP: NORMAL
HCT VFR BLD AUTO: 39.8 % (ref 42–52)
HGB BLD-MCNC: 12.9 G/DL (ref 14–18)
HGB UR QL STRIP: NEGATIVE
HYALINE CASTS #/AREA URNS LPF: ABNORMAL /LPF
IMM GRANULOCYTES # BLD AUTO: 0.04 X10(3)/MCL (ref 0–0.04)
IMM GRANULOCYTES NFR BLD AUTO: 0.7 %
KETONES UR QL STRIP: NEGATIVE
LEUKOCYTE ESTERASE UR QL STRIP: NEGATIVE
LYMPHOCYTES # BLD AUTO: 1.99 X10(3)/MCL (ref 0.6–4.6)
LYMPHOCYTES NFR BLD AUTO: 33 %
MCH RBC QN AUTO: 30.2 PG (ref 27–31)
MCHC RBC AUTO-ENTMCNC: 32.4 G/DL (ref 33–36)
MCV RBC AUTO: 93.2 FL (ref 80–94)
MONOCYTES # BLD AUTO: 0.68 X10(3)/MCL (ref 0.1–1.3)
MONOCYTES NFR BLD AUTO: 11.3 %
MUCOUS THREADS URNS QL MICRO: ABNORMAL /LPF
NEUTROPHILS # BLD AUTO: 2.9 X10(3)/MCL (ref 2.1–9.2)
NEUTROPHILS NFR BLD AUTO: 48 %
NITRITE UR QL STRIP: NEGATIVE
NRBC BLD AUTO-RTO: 0 %
PH UR STRIP: 6 [PH]
PLATELET # BLD AUTO: 214 X10(3)/MCL (ref 130–400)
PMV BLD AUTO: 10.5 FL (ref 7.4–10.4)
POTASSIUM SERPL-SCNC: 4 MMOL/L (ref 3.5–5.1)
PROT SERPL-MCNC: 7.7 GM/DL (ref 5.8–7.6)
PROT UR QL STRIP: NEGATIVE
RBC # BLD AUTO: 4.27 X10(6)/MCL (ref 4.7–6.1)
RBC #/AREA URNS AUTO: ABNORMAL /HPF
SODIUM SERPL-SCNC: 140 MMOL/L (ref 136–145)
SP GR UR STRIP.AUTO: 1.02 (ref 1–1.03)
SQUAMOUS #/AREA URNS LPF: ABNORMAL /HPF
UROBILINOGEN UR STRIP-ACNC: NORMAL
WBC # BLD AUTO: 6.03 X10(3)/MCL (ref 4.5–11.5)
WBC #/AREA URNS AUTO: ABNORMAL /HPF

## 2024-12-08 PROCEDURE — G0378 HOSPITAL OBSERVATION PER HR: HCPCS

## 2024-12-08 PROCEDURE — 96361 HYDRATE IV INFUSION ADD-ON: CPT

## 2024-12-08 PROCEDURE — 94799 UNLISTED PULMONARY SVC/PX: CPT

## 2024-12-08 PROCEDURE — 63600175 PHARM REV CODE 636 W HCPCS: Performed by: STUDENT IN AN ORGANIZED HEALTH CARE EDUCATION/TRAINING PROGRAM

## 2024-12-08 PROCEDURE — 81001 URINALYSIS AUTO W/SCOPE: CPT | Performed by: STUDENT IN AN ORGANIZED HEALTH CARE EDUCATION/TRAINING PROGRAM

## 2024-12-08 PROCEDURE — 27100171 HC OXYGEN HIGH FLOW UP TO 24 HOURS

## 2024-12-08 PROCEDURE — 99900035 HC TECH TIME PER 15 MIN (STAT)

## 2024-12-08 PROCEDURE — 85025 COMPLETE CBC W/AUTO DIFF WBC: CPT | Performed by: STUDENT IN AN ORGANIZED HEALTH CARE EDUCATION/TRAINING PROGRAM

## 2024-12-08 PROCEDURE — 27000190 HC CPAP FULL FACE MASK W/VALVE

## 2024-12-08 PROCEDURE — 80053 COMPREHEN METABOLIC PANEL: CPT | Performed by: STUDENT IN AN ORGANIZED HEALTH CARE EDUCATION/TRAINING PROGRAM

## 2024-12-08 PROCEDURE — 96360 HYDRATION IV INFUSION INIT: CPT

## 2024-12-08 PROCEDURE — 99285 EMERGENCY DEPT VISIT HI MDM: CPT | Mod: 25

## 2024-12-08 PROCEDURE — 25000003 PHARM REV CODE 250: Performed by: STUDENT IN AN ORGANIZED HEALTH CARE EDUCATION/TRAINING PROGRAM

## 2024-12-08 PROCEDURE — 94660 CPAP INITIATION&MGMT: CPT

## 2024-12-08 PROCEDURE — 99900031 HC PATIENT EDUCATION (STAT)

## 2024-12-08 PROCEDURE — 94760 N-INVAS EAR/PLS OXIMETRY 1: CPT

## 2024-12-08 PROCEDURE — 5A09357 ASSISTANCE WITH RESPIRATORY VENTILATION, LESS THAN 24 CONSECUTIVE HOURS, CONTINUOUS POSITIVE AIRWAY PRESSURE: ICD-10-PCS | Performed by: INTERNAL MEDICINE

## 2024-12-08 RX ORDER — TALC
6 POWDER (GRAM) TOPICAL NIGHTLY PRN
Status: DISCONTINUED | OUTPATIENT
Start: 2024-12-08 | End: 2024-12-12 | Stop reason: HOSPADM

## 2024-12-08 RX ORDER — SODIUM CHLORIDE 0.9 % (FLUSH) 0.9 %
10 SYRINGE (ML) INJECTION
Status: DISCONTINUED | OUTPATIENT
Start: 2024-12-08 | End: 2024-12-12 | Stop reason: HOSPADM

## 2024-12-08 RX ORDER — ACETAMINOPHEN 325 MG/1
650 TABLET ORAL EVERY 8 HOURS PRN
Status: DISCONTINUED | OUTPATIENT
Start: 2024-12-08 | End: 2024-12-12 | Stop reason: HOSPADM

## 2024-12-08 RX ORDER — SODIUM CHLORIDE 9 MG/ML
1000 INJECTION, SOLUTION INTRAVENOUS
Status: COMPLETED | OUTPATIENT
Start: 2024-12-08 | End: 2024-12-08

## 2024-12-08 RX ORDER — ONDANSETRON 4 MG/1
8 TABLET, ORALLY DISINTEGRATING ORAL EVERY 8 HOURS PRN
Status: DISCONTINUED | OUTPATIENT
Start: 2024-12-08 | End: 2024-12-12 | Stop reason: HOSPADM

## 2024-12-08 RX ADMIN — SODIUM CHLORIDE 1000 ML: 9 INJECTION, SOLUTION INTRAVENOUS at 04:12

## 2024-12-08 RX ADMIN — SODIUM CHLORIDE, POTASSIUM CHLORIDE, SODIUM LACTATE AND CALCIUM CHLORIDE 1000 ML: 600; 310; 30; 20 INJECTION, SOLUTION INTRAVENOUS at 06:12

## 2024-12-08 NOTE — Clinical Note
Diagnosis: Failure to thrive in adult [657916]   Future Attending Provider: SEBASTIÁN WATERMAN [[id number]]   Admit to which facility:: [facility] [[id number]]

## 2024-12-08 NOTE — ED PROVIDER NOTES
Encounter Date: 12/8/2024    SCRIBE #1 NOTE: I, Skylar Sulaiman, am scribing for, and in the presence of,  Jason Humphreys MD. I have scribed the following portions of the note - Other sections scribed: HPI, ROS, PE.       History     Chief Complaint   Patient presents with    Altered Mental Status     Family reports AMS for the last couple of days. Hx of Alzheimers, baseline GCS 14. Recently diagnosed with UTI. GCS 13 on arrival.      Patient is a 79 year old male with a history of alzheimer's disease and hypertension that presents to the ED for generalized weakness onset one and a half weeks ago. Patient's wife at bedside reports the patient was diagnosed with a UTI on Monday, 12/2, and prescribed Bactrim. She reports he has not been ambulating at home. She also reports altered mental status. Baseline GCS 14.     The history is provided by the patient, medical records and the spouse.     Review of patient's allergies indicates:   Allergen Reactions    Codeine Other (See Comments)     Other reaction(s): Confusion     Past Medical History:   Diagnosis Date    Alzheimer's disease, unspecified (CODE)     Anxiety disorder, unspecified     Arthritis     HTN (hypertension)      Past Surgical History:   Procedure Laterality Date    BACK SURGERY      SHOULDER SURGERY      SHOULDER SURGERY Left      Family History   Problem Relation Name Age of Onset    Heart disease Mother      Hypertension Mother      Hypertension Father       Social History     Tobacco Use    Smoking status: Never    Smokeless tobacco: Current     Types: Chew   Substance Use Topics    Alcohol use: Not Currently    Drug use: Never     Review of Systems   Reason unable to perform ROS: History of Alzheimer's disease.   Constitutional:         Generalized weakness.    Psychiatric/Behavioral:  Positive for confusion.        Physical Exam     Initial Vitals [12/08/24 1512]   BP Pulse Resp Temp SpO2   117/89 74 (!) 22 96.8 °F (36 °C) 97 %      MAP       --          Physical Exam    Nursing note and vitals reviewed.  Constitutional: He is not diaphoretic. No distress.   HENT:   Head: Normocephalic and atraumatic. Mouth/Throat: Mucous membranes are dry.   Neck: Neck supple.   Normal range of motion.  Cardiovascular:  Normal rate and regular rhythm.           Pulmonary/Chest: Breath sounds normal. No respiratory distress.   Abdominal: Abdomen is soft. He exhibits no distension. There is no abdominal tenderness.   Musculoskeletal:         General: No edema.      Cervical back: Normal range of motion and neck supple.     Neurological: He is alert. He is disoriented.   Generally weak. Intermittently following commands. Somnolent but arousable.  No focal neurological deficits.    Skin: Skin is warm. Capillary refill takes less than 2 seconds.         ED Course   Procedures  Labs Reviewed   COMPREHENSIVE METABOLIC PANEL - Abnormal       Result Value    Sodium 140      Potassium 4.0      Chloride 105      CO2 25      Glucose 118 (*)     Blood Urea Nitrogen 21.0      Creatinine 1.34 (*)     Calcium 9.2      Protein Total 7.7 (*)     Albumin 3.9      Globulin 3.8 (*)     Albumin/Globulin Ratio 1.0 (*)     Bilirubin Total 0.5            ALT 14      AST 20      eGFR 54      Anion Gap 10.0      BUN/Creatinine Ratio 16     URINALYSIS, REFLEX TO URINE CULTURE - Abnormal    Color, UA Yellow      Appearance, UA Clear      Specific Gravity, UA 1.017      pH, UA 6.0      Protein, UA Negative      Glucose, UA Normal      Ketones, UA Negative      Blood, UA Negative      Bilirubin, UA Negative      Urobilinogen, UA Normal      Nitrites, UA Negative      Leukocyte Esterase, UA Negative      RBC, UA 0-5      WBC, UA 0-5      Bacteria, UA None Seen      Squamous Epithelial Cells, UA Trace      Mucous, UA Trace (*)     Hyaline Casts, UA 0-2 (*)    CBC WITH DIFFERENTIAL - Abnormal    WBC 6.03      RBC 4.27 (*)     Hgb 12.9 (*)     Hct 39.8 (*)     MCV 93.2      MCH 30.2      MCHC 32.4 (*)      RDW 15.2      Platelet 214      MPV 10.5 (*)     Neut % 48.0      Lymph % 33.0      Mono % 11.3      Eos % 6.3      Basophil % 0.7      Lymph # 1.99      Neut # 2.90      Mono # 0.68      Eos # 0.38      Baso # 0.04      IG# 0.04      IG% 0.7      NRBC% 0.0     CBC W/ AUTO DIFFERENTIAL    Narrative:     The following orders were created for panel order CBC auto differential.  Procedure                               Abnormality         Status                     ---------                               -----------         ------                     CBC with Differential[[id number]]       Abnormal            Final result                 Please view results for these tests on the individual orders.          Imaging Results              X-Ray Chest AP Portable (Final result)  Result time 12/08/24 16:21:09      Final result by Aman James MD (12/08/24 16:21:09)                   Narrative:    EXAMINATION  XR CHEST AP PORTABLE    CLINICAL HISTORY  Altered mental status, unspecified    TECHNIQUE  A total of 1 frontal image(s) submitted of the chest.    COMPARISON  11 July 2024    FINDINGS  Lines/tubes/devices: ECG leads overlie the imaged region.    The cardiac silhouette and central vascular structures are unchanged.  The trachea is midline. No new or worsening consolidation is developed in the interval.  There is no large pleural effusion or convincing pneumothorax.  Left hemidiaphragm elevation is similar.    Regional osseous structures and extrathoracic soft tissues are similar.    IMPRESSION  No acute process or other adverse interval change.      Electronically signed by: Aman James  Date:    12/08/2024  Time:    16:21                                     CT Head Without Contrast (Final result)  Result time 12/08/24 16:01:13      Final result by Nilda Baker MD (12/08/24 16:01:13)                   Impression:      No appreciable acute intracranial abnormality.    Chronic cerebral atrophy and white  matter change.      Electronically signed by: Nilda Baker  Date:    12/08/2024  Time:    16:01               Narrative:    EXAMINATION:  CT HEAD WITHOUT CONTRAST    CLINICAL HISTORY:  Mental status change, unknown cause;    TECHNIQUE:  Low dose axial CT images obtained throughout the head without intravenous contrast.  Axial, sagittal and coronal reconstructions were performed and interpreted.    DLP: 916 mGycm    All CT scans at this location are performed using dose optimization techniques as appropriate to a performed exam including the following automated exposure control, adjustment of the mA and/or kV according to patient size and/or use of iterative reconstruction technique    COMPARISON:  CT head 07/02/2024, MRI brain 07/04/2024    FINDINGS:  BRAIN: Gray white differentiation is maintained.  Severe periventricular and subcortical white matter changes most compatible with chronic small vessel ischemic disease.  Moderate cerebral atrophy.  No hemorrhage. No edema. No mass effect or midline shift.  The posterior fossa and midline structures are unremarkable.    VENTRICLES: Ex vacuo dilatation of the ventricles similar to previous.    EXTRA-AXIAL: No abnormal extra-axial collections.    BONES: Calvarium is intact.    SINUSES AND MASTOIDS: Visualized paranasal sinuses and mastoid air cells are clear.                                       Medications   sodium chloride 0.9% flush 10 mL (has no administration in time range)   melatonin tablet 6 mg (has no administration in time range)   acetaminophen tablet 650 mg (has no administration in time range)   ondansetron disintegrating tablet 8 mg (has no administration in time range)   0.9% NaCl infusion (1,000 mLs Intravenous New Bag 12/8/24 1623)   lactated ringers bolus 1,000 mL (0 mLs Intravenous Stopped 12/8/24 1941)     Medical Decision Making  79-year-old with a history of Alzheimer's has been being treated outpatient for UTI but it has had progressively  worsening generalized weakness and altered mental status per his wife is not eating or drinking can no longer walk due to his legs giving out for the last few days  Patient is very generally weak and confused on exam no focal deficits noted he has clinically dehydrated appearing dry mucous membranes  Obtain labs hydrate anticipate admission    Differential diagnosis includes, but is not limited to:  Judging by the patient's chief complaint and pertinent history, the patient has the following possible differential diagnoses, including but not limited to the following.  Some of these are deemed to be lower likelihood and some more likely based on my physical exam and history combined with possible lab work and/or imaging studies.   Please see the pertinent studies, and refer to the HPI.  Some of these diagnoses will take further evaluation to fully rule out, perhaps as an outpatient and the patient was encouraged to follow up when discharged for more comprehensive evaluation.    Metabolic abnormality, intoxication, toxic ingestion, CVA, infection, structural (SAH, ICH, trauma, neoplastic), seizure/postictal, polypharmacy       Problems Addressed:  AMS (altered mental status): acute illness or injury that poses a threat to life or bodily functions  Failure to thrive in adult: acute illness or injury that poses a threat to life or bodily functions    Amount and/or Complexity of Data Reviewed  Labs: ordered.  Radiology: ordered and independent interpretation performed.     Details: Head CT - no obvious bleed or mass   CXR - no obvious infiltrates, consolidations, pleural effusions, or pneumothorax.        Risk  OTC drugs.  Prescription drug management.            Scribe Attestation:   Scribe #1: I performed the above scribed service and the documentation accurately describes the services I performed. I attest to the accuracy of the note.    Attending Attestation:           Physician Attestation for Scribe:  Physician  Attestation Statement for Scribe #1: I, Jason Humphreys MD, reviewed documentation, as scribed by Skylar Hilario in my presence, and it is both accurate and complete.             ED Course as of 12/08/24 2040   Sun Dec 08, 2024   7126 79-year-old with a history of Alzheimer's has been being treated outpatient for UTI but it has had progressively worsening generalized weakness and altered mental status per his wife is not eating or drinking can no longer walk due to his legs giving out for the last few days  Patient is very generally weak and confused on exam no focal deficits noted he has clinically dehydrated appearing dry mucous membranes  Obtain labs hydrate anticipate admission   [AC]   1801 Dr. Melchor - accepts admission  [AC]      ED Course User Index  [AC] Jason Humphreys IV, MD                             Clinical Impression:  Final diagnoses:  [R41.82] AMS (altered mental status)  [R62.7] Failure to thrive in adult          ED Disposition Condition    Observation Stable                Jason Humphreys IV, MD  12/08/24 2041

## 2024-12-09 PROBLEM — G93.41 ENCEPHALOPATHY, METABOLIC: Status: ACTIVE | Noted: 2024-12-09

## 2024-12-09 LAB — AMMONIA PLAS-MSCNC: 28.8 UMOL/L (ref 18–72)

## 2024-12-09 PROCEDURE — 27100171 HC OXYGEN HIGH FLOW UP TO 24 HOURS

## 2024-12-09 PROCEDURE — 97162 PT EVAL MOD COMPLEX 30 MIN: CPT

## 2024-12-09 PROCEDURE — 99900035 HC TECH TIME PER 15 MIN (STAT)

## 2024-12-09 PROCEDURE — 27000221 HC OXYGEN, UP TO 24 HOURS

## 2024-12-09 PROCEDURE — 63600175 PHARM REV CODE 636 W HCPCS: Performed by: INTERNAL MEDICINE

## 2024-12-09 PROCEDURE — 82140 ASSAY OF AMMONIA: CPT | Performed by: INTERNAL MEDICINE

## 2024-12-09 PROCEDURE — 99900031 HC PATIENT EDUCATION (STAT)

## 2024-12-09 PROCEDURE — 94660 CPAP INITIATION&MGMT: CPT

## 2024-12-09 PROCEDURE — 21400001 HC TELEMETRY ROOM

## 2024-12-09 PROCEDURE — 36415 COLL VENOUS BLD VENIPUNCTURE: CPT | Performed by: INTERNAL MEDICINE

## 2024-12-09 PROCEDURE — 25000003 PHARM REV CODE 250: Performed by: INTERNAL MEDICINE

## 2024-12-09 PROCEDURE — 97167 OT EVAL HIGH COMPLEX 60 MIN: CPT

## 2024-12-09 PROCEDURE — 94761 N-INVAS EAR/PLS OXIMETRY MLT: CPT

## 2024-12-09 RX ORDER — ALPRAZOLAM 0.25 MG/1
0.25 TABLET ORAL 3 TIMES DAILY PRN
Status: DISCONTINUED | OUTPATIENT
Start: 2024-12-09 | End: 2024-12-12 | Stop reason: HOSPADM

## 2024-12-09 RX ORDER — CEFTRIAXONE 2 G/1
2 INJECTION, POWDER, FOR SOLUTION INTRAMUSCULAR; INTRAVENOUS
Status: DISCONTINUED | OUTPATIENT
Start: 2024-12-09 | End: 2024-12-12 | Stop reason: HOSPADM

## 2024-12-09 RX ORDER — MEMANTINE HYDROCHLORIDE 5 MG/1
10 TABLET ORAL 2 TIMES DAILY
Status: DISCONTINUED | OUTPATIENT
Start: 2024-12-09 | End: 2024-12-12 | Stop reason: HOSPADM

## 2024-12-09 RX ORDER — TRAMADOL HYDROCHLORIDE 50 MG/1
50 TABLET ORAL EVERY 6 HOURS PRN
Status: ON HOLD | COMMUNITY
End: 2024-12-12

## 2024-12-09 RX ORDER — DONEPEZIL HYDROCHLORIDE 5 MG/1
5 TABLET, FILM COATED ORAL DAILY
Status: DISCONTINUED | OUTPATIENT
Start: 2024-12-10 | End: 2024-12-12 | Stop reason: HOSPADM

## 2024-12-09 RX ORDER — ATORVASTATIN CALCIUM 10 MG/1
20 TABLET, FILM COATED ORAL DAILY
Status: DISCONTINUED | OUTPATIENT
Start: 2024-12-10 | End: 2024-12-12 | Stop reason: HOSPADM

## 2024-12-09 RX ORDER — IBUPROFEN 200 MG
1 TABLET ORAL DAILY
Status: DISCONTINUED | OUTPATIENT
Start: 2024-12-10 | End: 2024-12-12 | Stop reason: HOSPADM

## 2024-12-09 RX ADMIN — ONDANSETRON 8 MG: 4 TABLET, ORALLY DISINTEGRATING ORAL at 07:12

## 2024-12-09 RX ADMIN — PREGABALIN 75 MG: 25 CAPSULE ORAL at 07:12

## 2024-12-09 RX ADMIN — MEMANTINE 10 MG: 5 TABLET ORAL at 07:12

## 2024-12-09 RX ADMIN — CEFTRIAXONE SODIUM 2 G: 2 INJECTION, POWDER, FOR SOLUTION INTRAMUSCULAR; INTRAVENOUS at 06:12

## 2024-12-09 NOTE — H&P
[facility] - [facility]    History & Physical      Patient Name: Rojas Foster  MRN: [id number]  Admission Date: 12/8/2024  Attending Physician: Aldo Dhal MD   Primary Care Provider: Vishal Correa MD         Patient information was obtained from ER records.     Subjective:     Principal Problem:<principal problem not specified>    Chief Complaint:   Chief Complaint   Patient presents with    Altered Mental Status     Family reports AMS for the last couple of days. Hx of Alzheimers, baseline GCS 14. Recently diagnosed with UTI. GCS 13 on arrival.         HPI: 78 y/o male with extensive medical history of Alzheimer's disease, generalized anxiety disorder, arthritis, hypertension presented to the ed with complaints of worsening weakness confusion and cough and further reported to have exposed to covid and susbequent work up suggestive of fevers with encephalopathy and subsequentlya dmitted on iv abx and close monitoring     Past Medical History:   Diagnosis Date    Alzheimer's disease, unspecified (CODE)     Anxiety disorder, unspecified     Arthritis     HTN (hypertension)        Past Surgical History:   Procedure Laterality Date    BACK SURGERY      SHOULDER SURGERY      SHOULDER SURGERY Left        Review of patient's allergies indicates:   Allergen Reactions    Codeine Other (See Comments)     Other reaction(s): Confusion       No current facility-administered medications on file prior to encounter.     Current Outpatient Medications on File Prior to Encounter   Medication Sig    aspirin (ECOTRIN) 81 MG EC tablet Take 1 tablet (81 mg total) by mouth once daily.    atorvastatin (LIPITOR) 20 MG tablet Take 1 tablet (20 mg total) by mouth once daily.    ferrous sulfate 325 (65 FE) MG EC tablet Take 1 tablet (325 mg total) by mouth once daily.    memantine (NAMENDA) 10 MG Tab Take 1 tablet (10 mg total) by mouth 2 (two) times daily.    multivitamin Tab Take 1 tablet by  mouth once daily.    pregabalin (LYRICA) 75 MG capsule Take 1 capsule (75 mg total) by mouth 2 (two) times daily. (Patient taking differently: Take 100 mg by mouth 2 (two) times daily.)    sertraline (ZOLOFT) 25 MG tablet Take 3 tablets (75 mg total) by mouth once daily.    traMADoL (ULTRAM) 50 mg tablet Take 50 mg by mouth every 6 (six) hours as needed for Pain.    alendronate (FOSAMAX) 70 MG tablet Take 1 tablet (70 mg total) by mouth every 7 days. (Patient taking differently: Take 35 mg by mouth every 7 days.)    ALPRAZolam (XANAX) 0.25 MG tablet Take 1 tablet (0.25 mg total) by mouth 3 (three) times daily as needed for Anxiety.    donepeziL (ARICEPT) 5 MG tablet Take 1 tablet (5 mg total) by mouth once daily.    famotidine (PEPCID) 20 MG tablet Take 1 tablet (20 mg total) by mouth 2 (two) times daily.    hydrOXYzine pamoate (VISTARIL) 50 MG Cap Take 1 capsule (50 mg total) by mouth nightly as needed (insomnia).    lisinopriL (PRINIVIL,ZESTRIL) 5 MG tablet Take 1 tablet (5 mg total) by mouth once daily.    nicotine (NICODERM CQ) 21 mg/24 hr Place 1 patch onto the skin once daily.    oxyCODONE (ROXICODONE) 5 MG immediate release tablet Take 1 tablet (5 mg total) by mouth 3 (three) times daily as needed for Pain.    polyethylene glycol (GLYCOLAX) 17 gram PwPk Take 17 g by mouth once daily.    [DISCONTINUED] triamterene-hydrochlorothiazide 37.5-25 mg (DYAZIDE) 37.5-25 mg per capsule Take 1 capsule by mouth every morning.     Family History       Problem Relation (Age of Onset)    Heart disease Mother    Hypertension Mother, Father          Tobacco Use    Smoking status: Never    Smokeless tobacco: Current     Types: Chew   Substance and Sexual Activity    Alcohol use: Not Currently    Drug use: Never    Sexual activity: Not Currently     Review of Systems  Objective:     Vital Signs (Most Recent):  Temp: 98 °F (36.7 °C) (12/09/24 1210)  Pulse: 62 (12/09/24 1210)  Resp: 15 (12/09/24 0902)  BP: (!) 151/61 (12/09/24  1210)  SpO2: 97 % (12/09/24 1210) Vital Signs (24h Range):  Temp:  [96.8 °F (36 °C)-98 °F (36.7 °C)] 98 °F (36.7 °C)  Pulse:  [56-88] 62  Resp:  [11-22] 15  SpO2:  [91 %-98 %] 97 %  BP: (104-151)/(61-89) 151/61     Weight: 93 kg (205 lb 0.4 oz)  Body mass index is 32.11 kg/m².    Physical Exam       Significant Labs: All pertinent labs within the past 24 hours have been reviewed.  Lab Results   Component Value Date    WBC 6.03 12/08/2024    HGB 12.9 (L) 12/08/2024    HCT 39.8 (L) 12/08/2024    MCV 93.2 12/08/2024     12/08/2024         Recent Labs   Lab 12/08/24  1537      K 4.0      CO2 25   BUN 21.0   CREATININE 1.34*   GLUCOSE 118*   CALCIUM 9.2       Significant Imaging: I have reviewed all pertinent imaging results/findings within the past 24 hours.    Assessment/Plan:     There are no hospital problems to display for this patient.    VTE Risk Mitigation (From admission, onward)           Ordered     IP VTE HIGH RISK PATIENT  Once         12/08/24 1803     Place sequential compression device  Until discontinued         12/08/24 1803                    Metabolic encephalopathy  Complicated uti  Dementia  Htn  Hld  H/o tia  Arthritis     Plan :  Ivf  Iv abx  Follow cx  Resume home meds  Labs in am  Gi and dvt ppx  Full code       Aldo Dahl MD  Department of Hospital Medicine   [facility] - [facility]

## 2024-12-09 NOTE — PLAN OF CARE
12/09/24 1059   Discharge Assessment   Assessment Type Discharge Planning Assessment   Confirmed/corrected address, phone number and insurance Yes   Confirmed Demographics Correct on Facesheet   Source of Information patient;family  (Ivanai Foster (Spouse)  [phone] (Home Phone))   If unable to respond/provide information was family/caregiver contacted? Yes   Contact Name/Number Wife: Ivania Foster (Spouse)  [phone] (Home Phone)   Does patient/caregiver understand observation status Yes   Communicated ALVARO with patient/caregiver Date not available/Unable to determine   Reason For Admission 79 y.o. male admitted with a medical diagnosis of AMS, UTI, generalized weakness.  He presents with the following impairments/functional limitations: weakness, impaired balance, impaired endurance, impaired self care skills, impaired functional mobility, gait instability, decreased coordination, decreased safety awareness (pt is very [place]) .   People in Home spouse   Facility Arrived From: Private residence: [place], [place].   Do you expect to return to your current living situation? Yes   Do you have help at home or someone to help you manage your care at home? Yes   Who are your caregiver(s) and their phone number(s)? Wife: Ivania Foster (Spouse)  [phone] (Home Phone); Son: Michael: [phone]   Prior to hospitilization cognitive status: Alert/Oriented   Current cognitive status: Alert/Oriented   Walking or Climbing Stairs Difficulty yes   Walking or Climbing Stairs ambulation difficulty, requires equipment   Mobility Management The patient has the following DME: 1. Rolling Walker 2. Wheelchair 3. Straight Cane 4. Lift chair PRN mobility concerns. He requires assist x1 by family and is mostly bedbound as reported by his wife (Sourav Foster).   Dressing/Bathing Difficulty yes   Dressing/Bathing bathing difficulty, requires equipment   Dressing/Bathing Management The patient uses the following DME for  Bathing/Showering needs: 1. Grab bars 2. Shower Chair 3. Raised Toilet PRN safety with hygiene needs.   Home Accessibility wheelchair accessible  (The patient's home is built on a slab and there is a ramp to assist with wheelchair entry into his private residence.)   Home Layout Able to live on 1st floor   Equipment Currently Used at Home bedside commode;walker, rolling;cane, straight;wheelchair;CPAP;shower chair;raised toilet  (The patient has a lift chair/recliner to assist with position changes from sitting to standing PRN mobility concerns.)   Readmission within 30 days? No   Patient currently being followed by outpatient case management? No   Do you currently have service(s) that help you manage your care at home? No   Do you take prescription medications? Yes   Do you have prescription coverage? Yes   Coverage Payor: MEDICARE - MEDICARE PART A & B -   Do you have any problems affording any of your prescribed medications? No   Is the patient taking medications as prescribed? yes   Who is going to help you get home at discharge? Wife: Ivania Foster (Spouse) [phone] (Home Phone); Son: Michael: [phone]   How do you get to doctors appointments? family or friend will provide   Are you on dialysis? No   Do you take coumadin? No   Discharge Plan A Skilled Nursing Facility  (Therapy is recommending (Moderate Intensity) for (SNF) services. The patient's wife (Ivania Foster is requesting that he transition form (SNF) to long-term NH placement.)   Discharge Plan B New Nursing Home placement - long-term care facility   DME Needed Upon Discharge  none   Discharge Plan discussed with: Patient;Spouse/sig other   Name(s) and Number(s) Wife: Ivania Foster (Spouse) [phone] (Home Phone); Son: Michael: [phone]   Transition of Care Barriers None   Financial Resource Strain   How hard is it for you to pay for the very basics like food, housing, medical care, and heating? Not very   Housing Stability   In the  last 12 months, was there a time when you were not able to pay the mortgage or rent on time? N   At any time in the past 12 months, were you homeless or living in a shelter (including now)? N   Transportation Needs   Has the lack of transportation kept you from medical appointments, meetings, work or from getting things needed for daily living? No   Food Insecurity   Within the past 12 months, you worried that your food would run out before you got the money to buy more. Never true   Within the past 12 months, the food you bought just didn't last and you didn't have money to get more. Never true   Stress   Do you feel stress - tense, restless, nervous, or anxious, or unable to sleep at night because your mind is troubled all the time - these days? Only a littl   Social Isolation   How often do you feel lonely or isolated from those around you?  Rarely   Alcohol Use   Q1: How often do you have a drink containing alcohol? Never   Q2: How many drinks containing alcohol do you have on a typical day when you are drinking? None   Q3: How often do you have six or more drinks on one occasion? Never   Utilities   In the past 12 months has the electric, gas, oil, or water company threatened to shut off services in your home? No   Health Literacy   How often do you need to have someone help you when you read instructions, pamphlets, or other written material from your doctor or pharmacy? Never   OTHER   Name(s) of People in Home Wife: Ivania Foster (Spouse) [phone] (Home Phone); Son: Michael: [phone] is actively involved in his dad's healthcare needs.   Wife: Ivania Foster: [phone]  Son: Michael Foster: [phone]  Pharmacy: [facility] ([place], [place]).   The patient and his wife are seeking (SNF) to long-term NH placement.

## 2024-12-09 NOTE — PLAN OF CARE
Problem: Occupational Therapy  Goal: Occupational Therapy Goal  Description: Goals to be met by: in 1 month      Patient will increase functional independence with ADLs by performing:    LE Dressing with Minimal Assistance.  Grooming while EOB with Modified [place].  Toileting from bedside commode with Minimal Assistance for hygiene and clothing management.   Toilet transfer to bedside commode with Minimal Assistance.  Increased functional strength to 5/5 for BUE.    Outcome: Progressing

## 2024-12-09 NOTE — PT/OT/SLP EVAL
Occupational Therapy  Evaluation    Name: Rojas Foster  MRN: [id number]  Admitting Diagnosis: AMS, failure to thrive, recent UTI, hx of alz  Recent Surgery: * No surgery found *      Recommendations:     Discharge therapy intensity: Moderate Intensity Therapy   Discharge Equipment Recommendations:  to be determined by next level of care  Barriers to discharge:       Assessment:     Rojas Foster is a 79 y.o. male with a medical diagnosis of AMS, failure to thrive, recent UTI, hx of alz.  He presents with the following performance deficits affecting function: weakness, impaired endurance, impaired functional mobility, impaired self care skills, impaired balance, gait instability, decreased upper extremity function.   Pt required max A sit <> stand at RW; ataxic in LLE. X2 stands, low endurance and tolerance, unable to take steps. Recommend moderate   Intensity at this time. Wife states pt has been using a wheelchair lately 2/2 poor strength.     Rehab Prognosis: Fair; patient would benefit from acute skilled OT services to address these deficits and reach maximum level of function.       Plan:     Patient to be seen 4 x/week to address the above listed problems via self-care/home management, therapeutic activities, therapeutic exercises  Plan of Care Expires: 01/09/25  Plan of Care Reviewed with: patient, spouse    Subjective       Occupational Profile:  Living Environment: pt lives with wife, 1 story with ramp, walk in shower   Previous level of function: lately has been requiring a lot of assist for ADLs and functional mobility; using w/c 2/2 LLE weakness   Roles and Routines: no driving or working   Equipment Used at Home: bedside commode, walker, rolling, wheelchair, shower chair  Assistance upon Discharge: tbd, pt requires too much assist for wife to physical assist     Pain/Comfort:  Pain Rating 1: 0/10    Patients cultural, spiritual, Hinduism conflicts given the current situation:      Objective:      OT communicated with nrsg prior to session.      Patient was found HOB elevated with   upon OT entry to room.    General Precautions: Standard, fall  Orthopedic Precautions:    Braces:        Bed Mobility:    Patient completed Supine to Sit with moderate assistance  Patient completed Sit to Supine with maximal assistance    Functional Mobility/Transfers:  Patient completed Sit <> Stand Transfer with maximal assistance  with  rolling walker   Functional Mobility: x2 stands at RW; LLE ataxic , unable to tolerate > 5 seconds each stand    Activities of Daily Living:  Lower Body Dressing: maximal assistance    Toileting: maximal assistance      AMPA 6 Click ADL:  AMPAC Total Score: 14    Functional Cognition:  Orientation: oriented to Person and required cues for place/time/year    Visual Perceptual Skills:  Pt denies     Upper Extremity Function:  Right Upper Extremity:   -3/5     Left Upper Extremity:  -3/5     Balance:   Static sitting balance: SBA       Patient Education:  Patient provided with verbal education education regarding OT role/goals/POC, fall prevention, and safety awareness.  Understanding was verbalized.     Patient left HOB elevated with all lines intact, call button in reach, and wife present.    GOALS:   Multidisciplinary Problems       Occupational Therapy Goals          Problem: Occupational Therapy    Goal Priority Disciplines Outcome Interventions   Occupational Therapy Goal     OT, PT/OT Progressing    Description: Goals to be met by: in 1 month      Patient will increase functional independence with ADLs by performing:    LE Dressing with Minimal Assistance.  Grooming while EOB with Modified [place].  Toileting from bedside commode with Minimal Assistance for hygiene and clothing management.   Toilet transfer to bedside commode with Minimal Assistance.  Increased functional strength to 5/5 for BUE.                         History:     Past Medical History:   Diagnosis Date     Alzheimer's disease, unspecified (CODE)     Anxiety disorder, unspecified     Arthritis     HTN (hypertension)          Past Surgical History:   Procedure Laterality Date    BACK SURGERY      SHOULDER SURGERY      SHOULDER SURGERY Left        Time Tracking:     OT Date of Treatment:    OT Start Time: 1100  OT Stop Time: 1123  OT Total Time (min): 23 min    Billable Minutes:Evaluation High Complexity     12/9/2024

## 2024-12-09 NOTE — PT/OT/SLP EVAL
Physical Therapy Evaluation    Patient Name:  Rojas Foster   MRN:  [id number]    Recommendations:     Discharge therapy intensity: Moderate Intensity Therapy   Discharge Equipment Recommendations: to be determined by next level of care   Barriers to discharge: Impaired mobility and Ongoing medical needs    Assessment:     Rojas Foster is a 79 y.o. male admitted with a medical diagnosis of AMS, UTI, generalized weakness.  He presents with the following impairments/functional limitations: weakness, impaired balance, impaired endurance, impaired self care skills, impaired functional mobility, gait instability, decreased coordination, decreased safety awareness (pt is very [place]) .    Rehab Prognosis: Good; patient would benefit from acute skilled PT services to address these deficits and reach maximum level of function.    Recent Surgery: * No surgery found *      Plan:     During this hospitalization, patient would benefit from acute PT services 5 x/week to address the identified rehab impairments via gait training, therapeutic activities, therapeutic exercises and progress toward the following goals:    Plan of Care Expires:       Subjective     Chief Complaint:   Patient/Family Comments/goals: \  Pain/Comfort:       Patients cultural, spiritual, Episcopal conflicts given the current situation:      Living Environment:  Pt lives in a house with his wife with 4 steps in front and just a threshold in the back, which is where they get in   Prior to admission, patients level of function was up until 2 weeks ago, he was able to walk without assist with rw in the house. He has not walked since then and mostly in wc.  Equipment used at home: walker, rolling, wheelchair, shower chair, raised toilet.  DME owned (not currently used): .  Upon discharge, patient will have assistance from wife and she does have 2 sons that can help.    Objective:     Communicated with nurse prior to session.  Patient found supine with  telemetry, pulse ox (continuous), blood pressure cuff, PureWick  upon PT entry to room.    General Precautions: Standard, fall  Orthopedic Precautions:N/A   Braces: N/A  Respiratory Status: Room air  Blood Pressure:       Exams:  RLE ROM: WFL  RLE Strength: 3+/5  LLE ROM: WFL  LLE Strength: 3+/5  Skin integrity: Visible skin intact      Functional Mobility:  Bed Mobility:     Scooting: maximal assistance  Supine to Sit: maximal assistance  Sit to Supine: maximal assistance  Transfers:     Sit to Stand:  maximal assistance with no AD  Gait: pt was not safe to ambulate. \  In sitting, he tends to lean back and has difficulty keeping his feet on the floor. Even when he leans forward in an attempt to stand, he wants to either  his feet or slide feet forward. Therefore, I had to block his knees and block feet so they would not move when I stood him      AM-PAC 6 CLICK MOBILITY  Total Score:        Treatment & Education:      Patient provided with verbal education education regarding PT role/goals/POC, fall prevention, and safety awareness.  Understanding was verbalized, however additional teaching warranted.     Patient left supine with all lines intact and call button in reach.    GOALS:   Multidisciplinary Problems       Physical Therapy Goals          Problem: Physical Therapy    Goal Priority Disciplines Outcome Interventions   Physical Therapy Goal     PT, PT/OT Progressing    Description: Pt will be seen for the following goals  1. Pt will be corry with bed mobility  2. Pt will be corry with transfers with a rw   3. Pt will ambulate with a rw 50ft with corry                       History:     Past Medical History:   Diagnosis Date    Alzheimer's disease, unspecified (CODE)     Anxiety disorder, unspecified     Arthritis     HTN (hypertension)        Past Surgical History:   Procedure Laterality Date    BACK SURGERY      SHOULDER SURGERY      SHOULDER SURGERY Left        Time Tracking:     PT Received On:    PT  Start Time: 0905     PT Stop Time: 0923  PT Total Time (min): 18 min     Billable Minutes: Evaluation 18      12/09/2024

## 2024-12-09 NOTE — PLAN OF CARE
Problem: Physical Therapy  Goal: Physical Therapy Goal  Description: Pt will be seen for the following goals  1. Pt will be corry with bed mobility  2. Pt will be corry with transfers with a rw   3. Pt will ambulate with a rw 50ft with corry  Outcome: Progressing

## 2024-12-10 LAB
ALBUMIN SERPL-MCNC: 3.6 G/DL (ref 3.4–4.8)
ALBUMIN/GLOB SERPL: 1.1 RATIO (ref 1.1–2)
ALP SERPL-CCNC: 98 UNIT/L (ref 40–150)
ALT SERPL-CCNC: 10 UNIT/L (ref 0–55)
ANION GAP SERPL CALC-SCNC: 7 MEQ/L
AST SERPL-CCNC: 19 UNIT/L (ref 5–34)
BASOPHILS # BLD AUTO: 0.02 X10(3)/MCL
BASOPHILS NFR BLD AUTO: 0.3 %
BILIRUB SERPL-MCNC: 0.4 MG/DL
BUN SERPL-MCNC: 16.2 MG/DL (ref 8.4–25.7)
CALCIUM SERPL-MCNC: 8.3 MG/DL (ref 8.8–10)
CHLORIDE SERPL-SCNC: 106 MMOL/L (ref 98–107)
CO2 SERPL-SCNC: 27 MMOL/L (ref 23–31)
CREAT SERPL-MCNC: 0.99 MG/DL (ref 0.72–1.25)
CREAT/UREA NIT SERPL: 16
EOSINOPHIL # BLD AUTO: 0.35 X10(3)/MCL (ref 0–0.9)
EOSINOPHIL NFR BLD AUTO: 5 %
ERYTHROCYTE [DISTWIDTH] IN BLOOD BY AUTOMATED COUNT: 14.9 % (ref 11.5–17)
GFR SERPLBLD CREATININE-BSD FMLA CKD-EPI: >60 ML/MIN/1.73/M2
GLOBULIN SER-MCNC: 3.2 GM/DL (ref 2.4–3.5)
GLUCOSE SERPL-MCNC: 94 MG/DL (ref 82–115)
HCT VFR BLD AUTO: 36.9 % (ref 42–52)
HGB BLD-MCNC: 12.3 G/DL (ref 14–18)
IMM GRANULOCYTES # BLD AUTO: 0.02 X10(3)/MCL (ref 0–0.04)
IMM GRANULOCYTES NFR BLD AUTO: 0.3 %
LYMPHOCYTES # BLD AUTO: 1.89 X10(3)/MCL (ref 0.6–4.6)
LYMPHOCYTES NFR BLD AUTO: 27.1 %
MCH RBC QN AUTO: 31.1 PG (ref 27–31)
MCHC RBC AUTO-ENTMCNC: 33.3 G/DL (ref 33–36)
MCV RBC AUTO: 93.2 FL (ref 80–94)
MONOCYTES # BLD AUTO: 0.72 X10(3)/MCL (ref 0.1–1.3)
MONOCYTES NFR BLD AUTO: 10.3 %
NEUTROPHILS # BLD AUTO: 3.97 X10(3)/MCL (ref 2.1–9.2)
NEUTROPHILS NFR BLD AUTO: 57 %
NRBC BLD AUTO-RTO: 0 %
PLATELET # BLD AUTO: 198 X10(3)/MCL (ref 130–400)
PMV BLD AUTO: 10.7 FL (ref 7.4–10.4)
POTASSIUM SERPL-SCNC: 3.9 MMOL/L (ref 3.5–5.1)
PROT SERPL-MCNC: 6.8 GM/DL (ref 5.8–7.6)
RBC # BLD AUTO: 3.96 X10(6)/MCL (ref 4.7–6.1)
SODIUM SERPL-SCNC: 140 MMOL/L (ref 136–145)
WBC # BLD AUTO: 6.97 X10(3)/MCL (ref 4.5–11.5)

## 2024-12-10 PROCEDURE — 94760 N-INVAS EAR/PLS OXIMETRY 1: CPT

## 2024-12-10 PROCEDURE — 99900031 HC PATIENT EDUCATION (STAT)

## 2024-12-10 PROCEDURE — 36415 COLL VENOUS BLD VENIPUNCTURE: CPT | Performed by: INTERNAL MEDICINE

## 2024-12-10 PROCEDURE — 27100171 HC OXYGEN HIGH FLOW UP TO 24 HOURS

## 2024-12-10 PROCEDURE — 21400001 HC TELEMETRY ROOM

## 2024-12-10 PROCEDURE — 80053 COMPREHEN METABOLIC PANEL: CPT | Performed by: INTERNAL MEDICINE

## 2024-12-10 PROCEDURE — 63600175 PHARM REV CODE 636 W HCPCS: Performed by: INTERNAL MEDICINE

## 2024-12-10 PROCEDURE — 99900035 HC TECH TIME PER 15 MIN (STAT)

## 2024-12-10 PROCEDURE — 97535 SELF CARE MNGMENT TRAINING: CPT

## 2024-12-10 PROCEDURE — 25000003 PHARM REV CODE 250: Performed by: INTERNAL MEDICINE

## 2024-12-10 PROCEDURE — 85025 COMPLETE CBC W/AUTO DIFF WBC: CPT | Performed by: INTERNAL MEDICINE

## 2024-12-10 PROCEDURE — 94660 CPAP INITIATION&MGMT: CPT

## 2024-12-10 RX ADMIN — CEFTRIAXONE SODIUM 2 G: 2 INJECTION, POWDER, FOR SOLUTION INTRAMUSCULAR; INTRAVENOUS at 05:12

## 2024-12-10 RX ADMIN — MEMANTINE 10 MG: 5 TABLET ORAL at 09:12

## 2024-12-10 RX ADMIN — PREGABALIN 75 MG: 25 CAPSULE ORAL at 09:12

## 2024-12-10 RX ADMIN — ATORVASTATIN CALCIUM 20 MG: 10 TABLET, FILM COATED ORAL at 09:12

## 2024-12-10 RX ADMIN — DONEPEZIL HYDROCHLORIDE 5 MG: 5 TABLET, FILM COATED ORAL at 09:12

## 2024-12-10 NOTE — PLAN OF CARE
12/10/24 0829   Discharge Reassessment   Assessment Type Discharge Planning Reassessment   Did the patient's condition or plan change since previous assessment? No   Discharge Plan discussed with: Patient;Spouse/sig other;Adult children  (Son: Bentley Foster is requesting (SNF) referrals be sent to: 1. [facility] 2. [facility] 3. [facility].)   Name(s) and Number(s) Wife: Ivania Foster (Spouse)  [phone] (Home Phone)   Communicated ALVARO with patient/caregiver Date not available/Unable to determine   Discharge Plan A Skilled Nursing Facility  (Son: Bentley Foster is requesting (SNF) referrals be sent to: 1. [facility] 2. [facility] 3. [facility].)   Discharge Plan B Skilled Nursing Facility   DME Needed Upon Discharge  none   Transition of Care Barriers None   Why the patient remains in the hospital Requires continued medical care   Post-Acute Status   Post-Acute Authorization Placement   Post-Acute Placement Status Patient List Provided   Coverage Payor: MEDICARE - MEDICARE PART A & B -   Hospital Resources/Appts/Education Provided Appointments scheduled and added to S   Patient choice form signed by patient/caregiver List with quality metrics by geographic area provided   Discharge Delays None known at this time     The patient is requesting (SNF) referrals be sent to: 1. [facility] 2. [facility] 3. [facility]. Will keep the patient and family informed as updates are received.

## 2024-12-10 NOTE — PROGRESS NOTES
[facility] Hospital Medicine  Progress Note    Patient Name: Rojas Foster  MRN: [id number]  Patient Class: IP- Inpatient   Admission Date: 12/8/2024  Length of Stay: 1 days  Attending Physician: Aldo Dahl MD  Primary Care Provider: Vishal Correa MD        Subjective:     Principal Problem:Encephalopathy, metabolic    Interval History:   Today's info : seen and examined, no acute events overnight. Continues to improve   Afebrile  Confusion improving    Review of Systems   Constitutional:  Positive for fatigue.   HENT: Negative.     Eyes: Negative.    Respiratory: Negative.     Cardiovascular: Negative.    Gastrointestinal: Negative.    Endocrine: Negative.    Genitourinary: Negative.    Musculoskeletal: Negative.    Allergic/Immunologic: Negative.    Neurological:  Positive for weakness.   Psychiatric/Behavioral:  Positive for confusion.      Objective:     Vital Signs (Most Recent):  Temp: 98.1 °F (36.7 °C) (12/10/24 1118)  Pulse: 69 (12/10/24 1118)  Resp: 16 (12/10/24 0600)  BP: 123/78 (12/10/24 1118)  SpO2: (!) 94 % (12/10/24 1118) Vital Signs (24h Range):  Temp:  [97.4 °F (36.3 °C)-98.5 °F (36.9 °C)] 98.1 °F (36.7 °C)  Pulse:  [63-75] 69  Resp:  [15-16] 16  SpO2:  [91 %-98 %] 94 %  BP: (107-150)/(63-85) 123/78     Weight: 93 kg (205 lb 0.4 oz)  Body mass index is 32.11 kg/m².    Intake/Output Summary (Last 24 hours) at 12/10/2024 1305  Last data filed at 12/10/2024 0702  Gross per 24 hour   Intake 120 ml   Output 1200 ml   Net -1080 ml      Physical Exam  Vitals reviewed.   Constitutional:       Appearance: Normal appearance.   HENT:      Head: Normocephalic and atraumatic.      Right Ear: Tympanic membrane and external ear normal.      Nose: Nose normal.      Mouth/Throat:      Mouth: Mucous membranes are moist.   Eyes:      Extraocular Movements: Extraocular movements intact.      Pupils: Pupils are equal, round, and reactive to light.    Cardiovascular:      Rate and Rhythm: Normal rate and regular rhythm.      Pulses: Normal pulses.      Heart sounds: Normal heart sounds.   Pulmonary:      Effort: Pulmonary effort is normal.      Breath sounds: Normal breath sounds.   Abdominal:      General: Abdomen is flat. Bowel sounds are normal.      Palpations: Abdomen is soft.   Musculoskeletal:         General: Normal range of motion.      Cervical back: Normal range of motion and neck supple.   Skin:     General: Skin is warm and dry.   Neurological:      General: No focal deficit present.      Mental Status: He is alert and oriented to person, place, and time.   Psychiatric:         Mood and Affect: Mood normal.         Behavior: Behavior normal.           Overview/Hospital Course: stable    Significant Labs: All pertinent labs within the past 24 hours have been reviewed.  Lab Results   Component Value Date    WBC 6.97 12/10/2024    HGB 12.3 (L) 12/10/2024    HCT 36.9 (L) 12/10/2024    MCV 93.2 12/10/2024     12/10/2024         Recent Labs   Lab 12/10/24  0424      K 3.9      CO2 27   BUN 16.2   CREATININE 0.99   GLUCOSE 94   CALCIUM 8.3*       Significant Imaging: I have reviewed all pertinent imaging results/findings within the past 24 hours.    Assessment/Plan:      Active Diagnoses:    Diagnosis Date Noted POA    PRINCIPAL PROBLEM:  Encephalopathy, metabolic [G93.41] 12/09/2024 Yes      Problems Resolved During this Admission:     VTE Risk Mitigation (From admission, onward)           Ordered     IP VTE HIGH RISK PATIENT  Once         12/08/24 1803     Place sequential compression device  Until discontinued         12/08/24 1803                    Metabolic encephalopathy  Complicated uti  Dementia  Htn  Hld  H/o tia  Arthritis     Plan :  Ivf  Iv abx  Follow cx  Labs in am  Gi and dvt ppx    Aldo Dahl MD  Department of Hospital Medicine   [facility]

## 2024-12-10 NOTE — PT/OT/SLP PROGRESS
Occupational Therapy   Treatment    Name: Rojas Foster  MRN: [id number]  Admitting Diagnosis:  Encephalopathy, metabolic       Recommendations:     Recommended therapy intensity at discharge: Moderate Intensity Therapy   Discharge Equipment Recommendations:  to be determined by next level of care  Barriers to discharge:       Assessment:     Rojas Foster is a 79 y.o. male with a medical diagnosis of metabolic encephalopathy, complicated UTI, dementia, HTN, hx of arthritis and TIA.  He presents with the following performance deficits affecting function: weakness, impaired cognition, impaired endurance, impaired self care skills, impaired functional mobility, gait instability, impaired balance, decreased safety awareness, decreased lower extremity function, decreased upper extremity function, decreased coordination.     Pt soundly asleep upon OT arrival, requiring increased time to awaken as pt lethargic initially. Pt with good participation.     Rehab Prognosis:  Fair; patient would benefit from acute skilled OT services to address these deficits and reach maximum level of function.       Plan:     Patient to be seen 4 x/week to address the above listed problems via self-care/home management, therapeutic activities, therapeutic exercises  Plan of Care Expires: 01/09/25  Plan of Care Reviewed with: patient, spouse    Subjective     Pain/Comfort:  Pain Rating 1: 0/10    Objective:     Communicated with: RN prior to session.  Patient found HOB elevated with pulse ox (continuous), telemetry, oxygen, peripheral IV, PureWick upon OT entry to room.    General Precautions: Standard, fall    Orthopedic Precautions:N/A  Braces: N/A  Respiratory Status: Nasal cannula, flow 2 L/min  Vital Signs: Sp02: 90-96%     Occupational Performance:     Bed Mobility:    Patient completed Rolling/Turning to Left with  minimum assistance  Patient completed Rolling/Turning to Right with minimum assistance  Patient completed Supine to Sit  with minimum assistance x2  Patient completed Sit to Supine with max assistance x2    Functional Mobility/Transfers:  Patient completed Sit <> Stand Transfer with moderate-max assistance x2 with rolling walker, completed x3 trials, able to stand ~15-20 secs with max A for obtaining fully erect posture.   Functional Mobility: unable to take steps this date as pt with difficulty maintaining upright standing position due to posterior lean    Activities of Daily Living:  Grooming: moderate assistance washing face while seated EOB  Toileting: maximal assistance for hygiene at bed level as pt with BM    Therapeutic Positioning    OT interventions performed during the course of today's session in an effort to prevent and/or reduce acquired pressure injuries:   Education was provided on benefits of and recommendations for therapeutic positioning  Therapeutic positioning was provided at the conclusion of session to offload all bony prominences for the prevention and/or reduction of pressure injuries    Skin assessment: all bony prominences were assessed    Findings: no redness or breakdown noted    [facility] 6 Click ADL: 14    Patient Education:  Patient and spouse were provided with verbal education education regarding OT role/goals/POC, fall prevention, safety awareness, and pressure ulcer prevention.  Understanding was verbalized, however additional teaching warranted.    Patient left right sidelying with all lines intact, call button in reach, wedge under L side, pressure relief boots, and family present.    GOALS:   Multidisciplinary Problems       Occupational Therapy Goals          Problem: Occupational Therapy    Goal Priority Disciplines Outcome Interventions   Occupational Therapy Goal     OT, PT/OT Progressing    Description: Goals to be met by: in 1 month      Patient will increase functional independence with ADLs by performing:    LE Dressing with Minimal Assistance.  Grooming while EOB with Modified  [place].  Toileting from bedside commode with Minimal Assistance for hygiene and clothing management.   Toilet transfer to bedside commode with Minimal Assistance.  Increased functional strength to 5/5 for BUE.                         Time Tracking:     OT Date of Treatment: 12/10/24  OT Start Time: 1343  OT Stop Time: 1413  OT Total Time (min): 30 min    Billable Minutes:Self Care/Home Management 30 mins    OT/DERRICK: OT     Number of DERRICK visits since last OT visit: 1    12/10/2024

## 2024-12-11 PROCEDURE — 99900031 HC PATIENT EDUCATION (STAT)

## 2024-12-11 PROCEDURE — 21400001 HC TELEMETRY ROOM

## 2024-12-11 PROCEDURE — 27000221 HC OXYGEN, UP TO 24 HOURS

## 2024-12-11 PROCEDURE — 63600175 PHARM REV CODE 636 W HCPCS: Performed by: INTERNAL MEDICINE

## 2024-12-11 PROCEDURE — 99900035 HC TECH TIME PER 15 MIN (STAT)

## 2024-12-11 PROCEDURE — 25000003 PHARM REV CODE 250: Performed by: INTERNAL MEDICINE

## 2024-12-11 PROCEDURE — 97530 THERAPEUTIC ACTIVITIES: CPT

## 2024-12-11 RX ADMIN — MEMANTINE 10 MG: 5 TABLET ORAL at 08:12

## 2024-12-11 RX ADMIN — ATORVASTATIN CALCIUM 20 MG: 10 TABLET, FILM COATED ORAL at 08:12

## 2024-12-11 RX ADMIN — PREGABALIN 75 MG: 25 CAPSULE ORAL at 08:12

## 2024-12-11 RX ADMIN — DONEPEZIL HYDROCHLORIDE 5 MG: 5 TABLET, FILM COATED ORAL at 08:12

## 2024-12-11 RX ADMIN — CEFTRIAXONE SODIUM 2 G: 2 INJECTION, POWDER, FOR SOLUTION INTRAMUSCULAR; INTRAVENOUS at 06:12

## 2024-12-11 NOTE — PLAN OF CARE
Received an update by Milo BOYLE (Clinical Liaison: Aleena Morton) who reports that the patient has been accepted for (SNF) services and will be admitted tomorrow. I informed his wife (Ivania Foster) of the above discharge plans scheduled for tomorrow.

## 2024-12-11 NOTE — PROGRESS NOTES
[facility] Hospital Medicine  Progress Note    Patient Name: Rojas Foster  MRN: [id number]  Patient Class: IP- Inpatient   Admission Date: 12/8/2024  Length of Stay: 2 days  Attending Physician: Aldo Dahl MD  Primary Care Provider: Vishal Correa MD        Subjective:     Principal Problem:Encephalopathy, metabolic    Interval History:   Today's info : seen and examined, no acute events overnight. Continues to improve   Afebrile  Confusion improving  Cx so far neg  Will dc abx in am if remains afebrile and confusion improves  Pending placement    Review of Systems   Constitutional:  Positive for fatigue.   HENT: Negative.     Eyes: Negative.    Respiratory: Negative.     Cardiovascular: Negative.    Gastrointestinal: Negative.    Endocrine: Negative.    Genitourinary: Negative.    Musculoskeletal: Negative.    Allergic/Immunologic: Negative.    Neurological:  Positive for weakness.   Psychiatric/Behavioral:  Positive for confusion.      Objective:     Vital Signs (Most Recent):  Temp: 98.4 °F (36.9 °C) (12/11/24 1159)  Pulse: 62 (12/11/24 1159)  Resp: 20 (12/11/24 1159)  BP: 136/77 (12/11/24 1159)  SpO2: (!) 93 % (12/11/24 1159) Vital Signs (24h Range):  Temp:  [97.5 °F (36.4 °C)-99.4 °F (37.4 °C)] 98.4 °F (36.9 °C)  Pulse:  [60-79] 62  Resp:  [18-20] 20  SpO2:  [93 %-98 %] 93 %  BP: (136-170)/(77-91) 136/77     Weight: 93 kg (205 lb 0.4 oz)  Body mass index is 32.11 kg/m².    Intake/Output Summary (Last 24 hours) at 12/11/2024 1313  Last data filed at 12/10/2024 2216  Gross per 24 hour   Intake --   Output 300 ml   Net -300 ml      Physical Exam  Vitals reviewed.   Constitutional:       Appearance: Normal appearance.   HENT:      Head: Normocephalic and atraumatic.      Right Ear: Tympanic membrane and external ear normal.      Nose: Nose normal.      Mouth/Throat:      Mouth: Mucous membranes are moist.   Eyes:      Extraocular Movements:  Extraocular movements intact.      Pupils: Pupils are equal, round, and reactive to light.   Cardiovascular:      Rate and Rhythm: Normal rate and regular rhythm.      Pulses: Normal pulses.      Heart sounds: Normal heart sounds.   Pulmonary:      Effort: Pulmonary effort is normal.      Breath sounds: Normal breath sounds.   Abdominal:      General: Abdomen is flat. Bowel sounds are normal.      Palpations: Abdomen is soft.   Musculoskeletal:         General: Normal range of motion.      Cervical back: Normal range of motion and neck supple.   Skin:     General: Skin is warm and dry.   Neurological:      General: No focal deficit present.      Mental Status: He is alert and oriented to person, place, and time.   Psychiatric:         Mood and Affect: Mood normal.         Behavior: Behavior normal.           Overview/Hospital Course: stable    Significant Labs: All pertinent labs within the past 24 hours have been reviewed.  Lab Results   Component Value Date    WBC 6.97 12/10/2024    HGB 12.3 (L) 12/10/2024    HCT 36.9 (L) 12/10/2024    MCV 93.2 12/10/2024     12/10/2024         Recent Labs   Lab 12/10/24  0424      K 3.9      CO2 27   BUN 16.2   CREATININE 0.99   GLUCOSE 94   CALCIUM 8.3*       Significant Imaging: I have reviewed all pertinent imaging results/findings within the past 24 hours.    Assessment/Plan:      Active Diagnoses:    Diagnosis Date Noted POA    PRINCIPAL PROBLEM:  Encephalopathy, metabolic [G93.41] 12/09/2024 Yes      Problems Resolved During this Admission:     VTE Risk Mitigation (From admission, onward)           Ordered     IP VTE HIGH RISK PATIENT  Once         12/08/24 1803     Place sequential compression device  Until discontinued         12/08/24 1803                    Metabolic encephalopathy  Complicated uti  Dementia  Htn  Hld  H/o tia  Arthritis     Plan :  Ivf  Iv abx  Follow cx  Labs in am  Gi and dvt ppx    Aldo Dahl MD  Department of Hospital  Medicine [facility] [place] [facility] - [address] [place] [address]

## 2024-12-11 NOTE — PLAN OF CARE
Clinical Updates sent to Jessie who is still reviewing . Pt Has Been accepted by [facility] which is they second choice .

## 2024-12-11 NOTE — PT/OT/SLP PROGRESS
Physical Therapy Treatment    Patient Name:  Rojas Foster   MRN:  [id number]    Recommendations:     Discharge therapy intensity: Moderate Intensity Therapy   Discharge Equipment Recommendations: to be determined by next level of care  Barriers to discharge: Impaired mobility and Ongoing medical needs    Assessment:     Rojas Foster is a 79 y.o. male admitted with a medical diagnosis of metabolic encephalopathy, complicated UTI, dementia, HTN, hx of arthritis and TIA.  He presents with the following impairments/functional limitations: weakness, impaired balance, impaired endurance, impaired self care skills, impaired functional mobility, gait instability, decreased coordination, decreased safety awareness (pt is very [place]).    Rehab Prognosis: Good; patient would benefit from acute skilled PT services to address these deficits and reach maximum level of function.    Recent Surgery: * No surgery found *      Plan:     During this hospitalization, patient would benefit from acute PT services 5 x/week to address the identified rehab impairments via gait training, therapeutic activities, therapeutic exercises and progress toward the following goals:    Plan of Care Expires:  01/11/25    Subjective     Chief Complaint: none stated   Patient/Family Comments/goals: Be able to walk again   Pain/Comfort:   No verbal pain rating stated       Objective:     Communicated with RN prior to session.  Patient found HOB elevated with Condom Catheter, peripheral IV, telemetry, pulse ox (continuous) upon PT entry to room.     General Precautions: Standard, fall  Orthopedic Precautions: N/A  Braces: N/A  Respiratory Status: Room air  Blood Pressure: NT  Skin Integrity: Visible skin intact      Functional Mobility:  Bed Mobility:     Supine to Sit: maximal assistance and of 2 persons  Sit to Supine: maximal assistance and of 2 persons  Transfers:     Sit to Stand:  maximal assistance and of 2 persons with rolling walker  3 attempts  to achieve a full stands; only able to achieve partial stand with each rep due to maximal posterior lean and BLE weakness  Bed raised higher to assist with transfer  Static sitting balance: SBA-Min A   Min A intermittent due to posterior lean; mod VC cues to lean forward, unable to stay in midline for increased periods of time due to fatigue    Therapeutic Activities/Exercises:  Reverted to seated EOB exercises after transfers  Pt completed 2x10 LAQ to BLE   5 reps of ankle pumps bilaterally  Pt able to bridge hips in supine in order to adjust pads   Educated pt to perform bridges in the bed to promote hip strength     Education:  Patient and spouse were provided with verbal education education regarding PT role/goals/POC, fall prevention, safety awareness, and discharge/DME recommendations.  Understanding was verbalized, however additional teaching warranted.     Patient left HOB elevated with all lines intact, call button in reach, and spouse present    GOALS:   Multidisciplinary Problems       Physical Therapy Goals          Problem: Physical Therapy    Goal Priority Disciplines Outcome Interventions   Physical Therapy Goal     PT, PT/OT Progressing    Description: Pt will be seen for the following goals  1. Pt will be corry with bed mobility  2. Pt will be corry with transfers with a rw   3. Pt will ambulate with a rw 50ft with corry                       Time Tracking:     PT Received On: 12/11/24  PT Start Time: 1540     PT Stop Time: 1609  PT Total Time (min): 29 min     Billable Minutes: Therapeutic Activity 29    Treatment Type: Treatment  PT/PTA: PT     Number of PTA visits since last PT visit: 1 12/11/2024

## 2024-12-12 VITALS
BODY MASS INDEX: 32.18 KG/M2 | TEMPERATURE: 98 F | OXYGEN SATURATION: 93 % | HEIGHT: 67 IN | DIASTOLIC BLOOD PRESSURE: 84 MMHG | RESPIRATION RATE: 20 BRPM | WEIGHT: 205 LBS | SYSTOLIC BLOOD PRESSURE: 129 MMHG | HEART RATE: 67 BPM

## 2024-12-12 PROBLEM — G93.41 ENCEPHALOPATHY, METABOLIC: Status: RESOLVED | Noted: 2024-12-09 | Resolved: 2024-12-12

## 2024-12-12 LAB
ALBUMIN SERPL-MCNC: 3.7 G/DL (ref 3.4–4.8)
ALBUMIN/GLOB SERPL: 1.1 RATIO (ref 1.1–2)
ALP SERPL-CCNC: 111 UNIT/L (ref 40–150)
ALT SERPL-CCNC: 10 UNIT/L (ref 0–55)
ANION GAP SERPL CALC-SCNC: 16 MEQ/L
AST SERPL-CCNC: 16 UNIT/L (ref 5–34)
BASOPHILS # BLD AUTO: 0.05 X10(3)/MCL
BASOPHILS NFR BLD AUTO: 0.6 %
BILIRUB SERPL-MCNC: 0.5 MG/DL
BUN SERPL-MCNC: 17.4 MG/DL (ref 8.4–25.7)
CALCIUM SERPL-MCNC: 8.5 MG/DL (ref 8.8–10)
CHLORIDE SERPL-SCNC: 105 MMOL/L (ref 98–107)
CO2 SERPL-SCNC: 26 MMOL/L (ref 23–31)
CREAT SERPL-MCNC: 0.98 MG/DL (ref 0.72–1.25)
CREAT/UREA NIT SERPL: 18
EOSINOPHIL # BLD AUTO: 0.42 X10(3)/MCL (ref 0–0.9)
EOSINOPHIL NFR BLD AUTO: 5.1 %
ERYTHROCYTE [DISTWIDTH] IN BLOOD BY AUTOMATED COUNT: 14.9 % (ref 11.5–17)
GFR SERPLBLD CREATININE-BSD FMLA CKD-EPI: >60 ML/MIN/1.73/M2
GLOBULIN SER-MCNC: 3.4 GM/DL (ref 2.4–3.5)
GLUCOSE SERPL-MCNC: 93 MG/DL (ref 82–115)
HCT VFR BLD AUTO: 39.8 % (ref 42–52)
HGB BLD-MCNC: 12.9 G/DL (ref 14–18)
IMM GRANULOCYTES # BLD AUTO: 0.04 X10(3)/MCL (ref 0–0.04)
IMM GRANULOCYTES NFR BLD AUTO: 0.5 %
LYMPHOCYTES # BLD AUTO: 1.79 X10(3)/MCL (ref 0.6–4.6)
LYMPHOCYTES NFR BLD AUTO: 21.7 %
MCH RBC QN AUTO: 31 PG (ref 27–31)
MCHC RBC AUTO-ENTMCNC: 32.4 G/DL (ref 33–36)
MCV RBC AUTO: 95.7 FL (ref 80–94)
MONOCYTES # BLD AUTO: 0.77 X10(3)/MCL (ref 0.1–1.3)
MONOCYTES NFR BLD AUTO: 9.4 %
NEUTROPHILS # BLD AUTO: 5.16 X10(3)/MCL (ref 2.1–9.2)
NEUTROPHILS NFR BLD AUTO: 62.7 %
NRBC BLD AUTO-RTO: 0 %
PLATELET # BLD AUTO: 204 X10(3)/MCL (ref 130–400)
PMV BLD AUTO: 10.7 FL (ref 7.4–10.4)
POTASSIUM SERPL-SCNC: 4 MMOL/L (ref 3.5–5.1)
PROT SERPL-MCNC: 7.1 GM/DL (ref 5.8–7.6)
RBC # BLD AUTO: 4.16 X10(6)/MCL (ref 4.7–6.1)
SODIUM SERPL-SCNC: 147 MMOL/L (ref 136–145)
WBC # BLD AUTO: 8.23 X10(3)/MCL (ref 4.5–11.5)

## 2024-12-12 PROCEDURE — 80053 COMPREHEN METABOLIC PANEL: CPT | Performed by: INTERNAL MEDICINE

## 2024-12-12 PROCEDURE — 27100171 HC OXYGEN HIGH FLOW UP TO 24 HOURS

## 2024-12-12 PROCEDURE — 63600175 PHARM REV CODE 636 W HCPCS: Performed by: INTERNAL MEDICINE

## 2024-12-12 PROCEDURE — 36415 COLL VENOUS BLD VENIPUNCTURE: CPT | Performed by: INTERNAL MEDICINE

## 2024-12-12 PROCEDURE — S4991 NICOTINE PATCH NONLEGEND: HCPCS | Performed by: INTERNAL MEDICINE

## 2024-12-12 PROCEDURE — 25000003 PHARM REV CODE 250: Performed by: INTERNAL MEDICINE

## 2024-12-12 PROCEDURE — 85025 COMPLETE CBC W/AUTO DIFF WBC: CPT | Performed by: INTERNAL MEDICINE

## 2024-12-12 RX ORDER — PREGABALIN 75 MG/1
75 CAPSULE ORAL 2 TIMES DAILY
Start: 2024-12-12 | End: 2025-06-12

## 2024-12-12 RX ORDER — TRAMADOL HYDROCHLORIDE 50 MG/1
50 TABLET ORAL EVERY 6 HOURS PRN
Qty: 14 TABLET | Refills: 0 | Status: SHIPPED | OUTPATIENT
Start: 2024-12-12

## 2024-12-12 RX ORDER — ALPRAZOLAM 0.25 MG/1
0.25 TABLET ORAL 3 TIMES DAILY PRN
Qty: 12 TABLET | Refills: 0 | Status: SHIPPED | OUTPATIENT
Start: 2024-12-12 | End: 2025-01-11

## 2024-12-12 RX ADMIN — PREGABALIN 75 MG: 25 CAPSULE ORAL at 09:12

## 2024-12-12 RX ADMIN — ATORVASTATIN CALCIUM 20 MG: 10 TABLET, FILM COATED ORAL at 09:12

## 2024-12-12 RX ADMIN — DONEPEZIL HYDROCHLORIDE 5 MG: 5 TABLET, FILM COATED ORAL at 09:12

## 2024-12-12 RX ADMIN — MEMANTINE 10 MG: 5 TABLET ORAL at 09:12

## 2024-12-12 RX ADMIN — NICOTINE 1 PATCH: 21 PATCH, EXTENDED RELEASE TRANSDERMAL at 09:12

## 2024-12-12 RX ADMIN — CEFTRIAXONE SODIUM 2 G: 2 INJECTION, POWDER, FOR SOLUTION INTRAMUSCULAR; INTRAVENOUS at 03:12

## 2024-12-12 NOTE — NURSING
Addended by: MARIELA GARCIA on: 4/11/2024 11:15 AM     Modules accepted: Orders     PT AAOx4, VSS. IV discontinued and guaze/tape applied to site with no signs of bleeding or swelling noted. Telemetry monitor discontinued and returned to box. Pt wheeled down via wheelchair by [facility] [place] transport.

## 2024-12-12 NOTE — DISCHARGE SUMMARY
[facility] Hospital Medicine  Discharge Summary      Patient Name: Rojas Foster  MRN: [id number]  Admission Date: 12/8/2024  Hospital Length of Stay: 3 days  Discharge Date and Time:  12/12/2024 11:40 AM  Attending Physician: Aldo Dahl MD   Discharging Provider: Aldo Dahl MD  Discharge Provider Team: Networked reference to record PCT   Primary Care Provider: Vishal Correa MD        DC DIAGNOSES :    Metabolic encephalopathy  Complicated uti  Dementia  Htn  Hld  H/o tia  Arthritis    * No surgery found *      Hospital Course:   78 y/o male with extensive medical history of Alzheimer's disease, generalized anxiety disorder, arthritis, hypertension presented to the ed with complaints of worsening weakness confusion and cough and further reported to have exposed to covid and susbequent work up suggestive of fevers with encephalopathy and subsequentlya dmitted on iv abx and close monitoring   Cx neg  Afebrile  Confusion improving  Tolerating therapy  Subsequently dced to snf for ongoing complex medical managementa nd therapy    Consults:     Final Active Diagnoses:      Problems Resolved During this Admission:    Diagnosis Date Noted Date Resolved POA    PRINCIPAL PROBLEM:  Encephalopathy, metabolic [G93.41] 12/09/2024 12/12/2024 Yes      Discharged Condition: fair    Disposition: Skilled Nursing Facility    Follow Up:    Patient Instructions:      Diet Adult Regular     Reason for not Ordering Smoking Cessation Referral     Order Specific Question Answer Comments   Reason for not ordering: Patient refused      Activity as tolerated     Medications:  Reconciled Home Medications:      Medication List        CHANGE how you take these medications      pregabalin 75 MG capsule  Commonly known as: LYRICA  Take 1 capsule (75 mg total) by mouth 2 (two) times daily.  What changed: how much to take            CONTINUE taking these medications      alendronate  70 MG tablet  Commonly known as: FOSAMAX  Take 1 tablet (70 mg total) by mouth every 7 days.     ALPRAZolam 0.25 MG tablet  Commonly known as: XANAX  Take 1 tablet (0.25 mg total) by mouth 3 (three) times daily as needed for Anxiety.     aspirin 81 MG EC tablet  Commonly known as: ECOTRIN  Take 1 tablet (81 mg total) by mouth once daily.     atorvastatin 20 MG tablet  Commonly known as: LIPITOR  Take 1 tablet (20 mg total) by mouth once daily.     donepeziL 5 MG tablet  Commonly known as: ARICEPT  Take 1 tablet (5 mg total) by mouth once daily.     famotidine 20 MG tablet  Commonly known as: PEPCID  Take 1 tablet (20 mg total) by mouth 2 (two) times daily.     ferrous sulfate 325 (65 FE) MG EC tablet  Take 1 tablet (325 mg total) by mouth once daily.     hydrOXYzine pamoate 50 MG Cap  Commonly known as: VISTARIL  Take 1 capsule (50 mg total) by mouth nightly as needed (insomnia).     lisinopriL 5 MG tablet  Commonly known as: PRINIVIL,ZESTRIL  Take 1 tablet (5 mg total) by mouth once daily.     memantine 10 MG Tab  Commonly known as: NAMENDA  Take 1 tablet (10 mg total) by mouth 2 (two) times daily.     multivitamin Tab  Take 1 tablet by mouth once daily.     nicotine 21 mg/24 hr  Commonly known as: NICODERM CQ  Place 1 patch onto the skin once daily.     polyethylene glycol 17 gram Pwpk  Commonly known as: GLYCOLAX  Take 17 g by mouth once daily.     sertraline 25 MG tablet  Commonly known as: ZOLOFT  Take 3 tablets (75 mg total) by mouth once daily.     traMADoL 50 mg tablet  Commonly known as: ULTRAM  Take 1 tablet (50 mg total) by mouth every 6 (six) hours as needed for Pain.            STOP taking these medications      oxyCODONE 5 MG immediate release tablet  Commonly known as: ROXICODONE              Significant Diagnostic Studies: N/A    Pending Diagnostic Studies:       None          Indwelling Lines/Drains at time of discharge:   Lines/Drains/Airways       None                   Time spent on the discharge  of patient: 32 minutes         Aldo Dahl MD  Department of Hospital Medicine  [facility]

## 2024-12-12 NOTE — PLAN OF CARE
12/12/24 1220   Final Note   Assessment Type Final Discharge Note   Anticipated Discharge Disposition SNF  (FOC: The patient will be discharged from [facility] to [facility] for (SNF) services. The patient and his wife (Ivania Foster) were informed of the above discharge plans and both are in agreement.)   Hospital Resources/Appts/Education Provided Appointments scheduled and added to AVS   Post-Acute Status   Post-Acute Authorization Placement   Post-Acute Placement Status Set-up Complete/Auth obtained   Coverage Payor: MEDICARE - MEDICARE PART A & B -   Patient choice form signed by patient/caregiver List with quality metrics by geographic area provided   Discharge Delays None known at this time     The patient will be discharged from [facility] to [facility] for (SNF) services. Clinical notes/updates and AVS and D/C Summary were uploaded and sent via [employer] for review. The patient and his wife (Ivania) were informed of the above discharge plans and are in agreement. Transportation has been arranged by facility van.

## 2024-12-13 ENCOUNTER — LAB REQUISITION (OUTPATIENT)
Dept: LAB | Facility: HOSPITAL | Age: 79
End: 2024-12-13
Payer: MEDICARE

## 2024-12-13 DIAGNOSIS — E78.5 HYPERLIPIDEMIA, UNSPECIFIED: ICD-10-CM

## 2024-12-13 DIAGNOSIS — G30.9 ALZHEIMER'S DISEASE, UNSPECIFIED (CODE): ICD-10-CM

## 2024-12-13 DIAGNOSIS — I10 ESSENTIAL (PRIMARY) HYPERTENSION: ICD-10-CM

## 2024-12-13 DIAGNOSIS — G93.41 METABOLIC ENCEPHALOPATHY: ICD-10-CM

## 2024-12-13 LAB
ALBUMIN SERPL-MCNC: 3.9 G/DL (ref 3.4–4.8)
ALBUMIN/GLOB SERPL: 1.2 RATIO (ref 1.1–2)
ALP SERPL-CCNC: 106 UNIT/L (ref 40–150)
ALT SERPL-CCNC: 12 UNIT/L (ref 0–55)
ANION GAP SERPL CALC-SCNC: 12 MEQ/L
AST SERPL-CCNC: 15 UNIT/L (ref 5–34)
BASOPHILS # BLD AUTO: 0.03 X10(3)/MCL
BASOPHILS NFR BLD AUTO: 0.3 %
BILIRUB SERPL-MCNC: 0.6 MG/DL
BUN SERPL-MCNC: 19.4 MG/DL (ref 8.4–25.7)
CALCIUM SERPL-MCNC: 8.9 MG/DL (ref 8.8–10)
CHLORIDE SERPL-SCNC: 106 MMOL/L (ref 98–107)
CHOLEST SERPL-MCNC: 167 MG/DL
CHOLEST/HDLC SERPL: 4 {RATIO} (ref 0–5)
CO2 SERPL-SCNC: 25 MMOL/L (ref 23–31)
CREAT SERPL-MCNC: 1.02 MG/DL (ref 0.72–1.25)
CREAT/UREA NIT SERPL: 19
EOSINOPHIL # BLD AUTO: 0.37 X10(3)/MCL (ref 0–0.9)
EOSINOPHIL NFR BLD AUTO: 3.9 %
ERYTHROCYTE [DISTWIDTH] IN BLOOD BY AUTOMATED COUNT: 14.7 % (ref 11.5–17)
FERRITIN SERPL-MCNC: 373.43 NG/ML (ref 21.81–274.66)
GFR SERPLBLD CREATININE-BSD FMLA CKD-EPI: >60 ML/MIN/1.73/M2
GLOBULIN SER-MCNC: 3.2 GM/DL (ref 2.4–3.5)
GLUCOSE SERPL-MCNC: 84 MG/DL (ref 82–115)
HCT VFR BLD AUTO: 40.7 % (ref 42–52)
HDLC SERPL-MCNC: 42 MG/DL (ref 35–60)
HGB BLD-MCNC: 13.4 G/DL (ref 14–18)
IMM GRANULOCYTES # BLD AUTO: 0.04 X10(3)/MCL (ref 0–0.04)
IMM GRANULOCYTES NFR BLD AUTO: 0.4 %
IRON SERPL-MCNC: 58 UG/DL (ref 65–175)
LDLC SERPL CALC-MCNC: 105 MG/DL (ref 50–140)
LYMPHOCYTES # BLD AUTO: 2.02 X10(3)/MCL (ref 0.6–4.6)
LYMPHOCYTES NFR BLD AUTO: 21.1 %
MCH RBC QN AUTO: 30.5 PG (ref 27–31)
MCHC RBC AUTO-ENTMCNC: 32.9 G/DL (ref 33–36)
MCV RBC AUTO: 92.5 FL (ref 80–94)
MONOCYTES # BLD AUTO: 1.02 X10(3)/MCL (ref 0.1–1.3)
MONOCYTES NFR BLD AUTO: 10.7 %
NEUTROPHILS # BLD AUTO: 6.08 X10(3)/MCL (ref 2.1–9.2)
NEUTROPHILS NFR BLD AUTO: 63.6 %
NRBC BLD AUTO-RTO: 0 %
PLATELET # BLD AUTO: 244 X10(3)/MCL (ref 130–400)
PMV BLD AUTO: 11.2 FL (ref 7.4–10.4)
POTASSIUM SERPL-SCNC: 3.8 MMOL/L (ref 3.5–5.1)
PROT SERPL-MCNC: 7.1 GM/DL (ref 5.8–7.6)
RBC # BLD AUTO: 4.4 X10(6)/MCL (ref 4.7–6.1)
SODIUM SERPL-SCNC: 143 MMOL/L (ref 136–145)
TRIGL SERPL-MCNC: 99 MG/DL (ref 34–140)
TSH SERPL-ACNC: 2.48 UIU/ML (ref 0.35–4.94)
VLDLC SERPL CALC-MCNC: 20 MG/DL
WBC # BLD AUTO: 9.56 X10(3)/MCL (ref 4.5–11.5)

## 2024-12-13 PROCEDURE — 80053 COMPREHEN METABOLIC PANEL: CPT | Performed by: FAMILY MEDICINE

## 2024-12-13 PROCEDURE — 80061 LIPID PANEL: CPT | Performed by: FAMILY MEDICINE

## 2024-12-13 PROCEDURE — 84443 ASSAY THYROID STIM HORMONE: CPT | Performed by: FAMILY MEDICINE

## 2024-12-13 PROCEDURE — 85025 COMPLETE CBC W/AUTO DIFF WBC: CPT | Performed by: FAMILY MEDICINE

## 2024-12-13 PROCEDURE — 82728 ASSAY OF FERRITIN: CPT | Performed by: FAMILY MEDICINE

## 2024-12-13 PROCEDURE — 83540 ASSAY OF IRON: CPT | Performed by: FAMILY MEDICINE

## 2024-12-17 NOTE — PHYSICIAN QUERY
Please clarify if there is any clinical correlation between Metabolic encephalopathy and Complicated uti. Are the conditions:  Due to or associated with each other

## 2025-01-05 PROBLEM — G95.20 UNSPECIFIED CORD COMPRESSION: Status: RESOLVED | Noted: 2024-07-01 | Resolved: 2025-01-05

## 2025-01-05 NOTE — PROGRESS NOTES
Neurology Telemedicine Note  Patient treated using real-time Audio/Video, according to [facility] protocols  The patient (or their representative) stated that they understood & accepted the privacy/security risks to their info at their location.  Patient participated in the visit at a non-[facility] location selected by themself, or their representative.  Luna NICK NP, conducted the visit from the [facility] & am licensed in the state [facility], which is where the pt is currently located    CC: f/u - AD    HPI:   Advanced AD    On namenda 10mg bid & zoloft 75mg/day  Was prev on aricept (?was blacking out, so stopped)    Dx w/ UTI on 12/8; then was admitted to SNF @ [facility] on 12/12  More confused, couldn't stand, couldn't walk  He's much improved - mentation wise  Walking/standing are getting better    Memory is about the same  Doesn't like to participate in activities    Can feed himself, but needs assistance with all other ADLs  Wears diapers    On pap; can give some resistance when it comes time to put it on at night    Mrs. Redd & Michael are both on the call today  Visit today is for RFs    ROS:  A 14pt ROS was reviewed & is negative unless otherwise documented in the HPI    OBJECTIVE:  GENERAL: NAD, calm, cooperative, appropriate  Dozing off during the visit while sitting in his WC  [place]  RESP: CTAB  HEART: RRR  no LE edema  MENTAL STATUS: Oriented x4, follows commands reliably  SPEECH/LANGUAGE: Clear, coherent, paucity of speech  gaze conjugate  No tactile or motor facial asymmetry  t/p midline  Motor: No focal weakness  Cerebellar: No tremor or dysmetria  Gait: in WC    f2f time spent w/ pt exceeds 15 min, over 50% of which was used for education & counseling regarding medical conditions, current medications including risk/benefit & side effect/adverse events, otc meds - uses/doses, home self-care & contact precautions; red flags & indications for immediate medical attention. the patient is  receptive, expresses understanding and is agreeable to the plan. All questions answered.     RADIOLOGY  MRI Brain 7/4/24  Moderate chronic microvascular ischemic changes.     1. Alzheimer's disease  -     memantine (NAMENDA) 10 MG Tab; Take 1 tablet (10 mg total) by mouth 2 (two) times daily.  Dispense: 180 tablet; Refill: 4    Other orders  -     sertraline (ZOLOFT) 25 MG tablet; Take 3 tablets (75 mg total) by mouth once daily.  Dispense: 270 tablet; Refill: 4      PLAN:  Cont namenda 10mg bid & zoloft 75mg/day  Meds refilled  F/u 6mo - virutal    Luna Mahmood, AGACNP-BC

## 2025-01-06 ENCOUNTER — OFFICE VISIT (OUTPATIENT)
Dept: NEUROLOGY | Facility: CLINIC | Age: 80
End: 2025-01-06
Payer: MEDICARE

## 2025-01-06 DIAGNOSIS — G30.9 ALZHEIMER'S DISEASE: ICD-10-CM

## 2025-01-06 DIAGNOSIS — F02.80 ALZHEIMER'S DISEASE: ICD-10-CM

## 2025-01-06 PROCEDURE — 98004 SYNCH AUDIO-VIDEO EST SF 10: CPT | Mod: 95,,, | Performed by: NURSE PRACTITIONER

## 2025-01-06 RX ORDER — MEMANTINE HYDROCHLORIDE 10 MG/1
10 TABLET ORAL 2 TIMES DAILY
Qty: 180 TABLET | Refills: 4
Start: 2025-01-06 | End: 2026-04-01

## 2025-01-06 RX ORDER — SERTRALINE HYDROCHLORIDE 25 MG/1
75 TABLET, FILM COATED ORAL DAILY
Qty: 270 TABLET | Refills: 4
Start: 2025-01-06 | End: 2026-04-01

## 2025-01-06 NOTE — PATIENT INSTRUCTIONS
"Patient Education       Dementia (Including Alzheimer Disease)   The Basics   Written by the doctors and editors at [facility]   What is dementia? -- Dementia is the general term for a group of brain disorders that cause memory problems and make it hard to think clearly (figure 1).  What symptoms does dementia cause? -- The symptoms of dementia often start off very mild and get worse slowly. Symptoms can include:  Forgetfulness  Acting confused or disoriented  Trouble with speech and writing (for example, not being able to find the right words for things)  Trouble concentrating and reasoning  Problems with tasks such as paying bills or balancing a checkbook  Getting lost in familiar places  As dementia gets worse, people might:  Have episodes of anger or aggression  See things that aren't there or believe things that aren't true  Be unable to eat, bathe, dress, or do other everyday tasks  Lose bladder and bowel control  What are the different kinds of dementia? -- The most common kinds include:  Alzheimer disease - Alzheimer disease is the most common cause of dementia. It is a disorder in which brain cells slowly die over time.  Vascular dementia - Vascular dementia happens when parts of the brain do not get enough blood. This can happen when blood vessels in the brain get blocked with blood clots or damaged by high blood pressure or aging. This form of dementia is most common among people who have had strokes or who are at risk for strokes.  Parkinson disease dementia - Parkinson disease is a brain disorder that affects movement. It causes trembling, stiffness, and slowness. As Parkinson disease gets worse, some people develop dementia. "Lewy body dementia" is a related form of dementia.   Other causes of dementia - Dementia can also happen if a person's brain has been damaged. For example, having many head injuries can lead to dementia.  Should I see a doctor or nurse? -- Yes, you should see a doctor or nurse if " you think you or someone close to you is showing signs of dementia. Sometimes memory loss and confusion are caused by medical problems other than dementia that can be treated. For example, people with diabetes sometimes show signs of confusion when their blood sugar is not well controlled.  Are there tests I should have? -- Your doctor or nurse will decide which tests you should have based on your individual situation. Many people with signs of dementia do not need a brain scan. That's because the tests that are most useful are the ones that look at how you answer questions and do certain tasks. Even so, your doctor might want to do a brain scan (either CT or MRI) to make sure that your symptoms are not caused by a problem unrelated to dementia.  How is dementia treated? -- That depends on what kind of dementia you have. If you have Alzheimer disease, there are medicines that might help some. If you have vascular dementia, your doctor will focus on keeping your blood pressure and cholesterol as close to normal as possible. Doing that can help reduce further damage to the brain.  Sadly, there really aren't good treatments for most types of dementia. But doctors can sometimes treat troubling symptoms that come with dementia, such as depression or anxiety.  How do I stay safe? -- If you have dementia, you might not be aware of how much your condition affects you. Trust your family and friends to tell you when it is no longer safe for you to drive, cook, or do other things that could be dangerous.  Be aware, too, that people with dementia often fall and hurt themselves. To reduce the risk of falls, it's a good idea to:  Secure loose rugs or use non-skid backing on rugs  Tuck away loose wires or electrical cords  Wear sturdy, comfortable shoes  Keep walkways well lit  Can dementia be prevented? -- There are no proven ways to prevent dementia. But here are some things that seem to help keep the brain healthy:  Physical  activity  A healthy diet  Social interaction  All topics are updated as new evidence becomes available and our peer review process is complete.  This topic retrieved from [employer] on: Sep 21, 2021.  Topic 80095 Version 15.0  Release: 29.4.2 - C29.263  © 2021 UpToDate, Inc. and/or its affiliates. All rights reserved.  figure 1: Dementia caused by changes in the brain     Different forms of dementia are linked to changes in the brain. Alzheimer disease causes many parts of the brain to shrink. Parkinson disease damages parts of the brain involved in movement. Vascular dementia happens when the blood vessels that supply the brain are diseased.  Graphic 57178 Version 2.0    Consumer Information Use and Disclaimer   This information is not specific medical advice and does not replace information you receive from your health care provider. This is only a brief summary of general information. It does NOT include all information about conditions, illnesses, injuries, tests, procedures, treatments, therapies, discharge instructions or life-style choices that may apply to you. You must talk with your health care provider for complete information about your health and treatment options. This information should not be used to decide whether or not to accept your health care provider's advice, instructions or recommendations. Only your health care provider has the knowledge and training to provide advice that is right for you. The use of this information is governed by the [employer] End User License Agreement, available at https://www.[employer].Reesio/en/solutions/My eShoe/about/jj.The use of [employer] content is governed by the [employer] Terms of Use. ©2021 UpToDate, Inc. All rights reserved.  Copyright   © 2021 UpToDate, Inc. and/or its affiliates. All rights reserved.

## 2025-01-28 PROBLEM — G45.9 TIA (TRANSIENT ISCHEMIC ATTACK): Status: ACTIVE | Noted: 2025-01-28

## 2025-01-28 PROBLEM — G89.29 CHRONIC BILATERAL LOW BACK PAIN: Status: RESOLVED | Noted: 2022-08-25 | Resolved: 2025-01-28

## 2025-01-28 PROBLEM — K21.9 GERD WITHOUT ESOPHAGITIS: Chronic | Status: ACTIVE | Noted: 2025-01-28

## 2025-01-28 PROBLEM — G89.29 CHRONIC BACK PAIN: Chronic | Status: ACTIVE | Noted: 2022-08-25

## 2025-01-28 PROBLEM — G47.33 OSA ON CPAP: Status: RESOLVED | Noted: 2023-12-13 | Resolved: 2025-01-28

## 2025-01-28 PROBLEM — M54.50 CHRONIC BILATERAL LOW BACK PAIN: Status: RESOLVED | Noted: 2022-08-25 | Resolved: 2025-01-28

## 2025-01-28 PROBLEM — F41.1 GENERALIZED ANXIETY DISORDER: Chronic | Status: ACTIVE | Noted: 2025-01-28

## 2025-01-28 PROBLEM — K21.9 GERD WITHOUT ESOPHAGITIS: Status: ACTIVE | Noted: 2025-01-28

## 2025-01-28 PROBLEM — I10 PRIMARY HYPERTENSION: Chronic | Status: ACTIVE | Noted: 2022-08-25

## 2025-01-28 PROBLEM — F02.84 DEMENTIA IN OTHER DISEASES CLASSIFIED ELSEWHERE, UNSPECIFIED SEVERITY, WITH ANXIETY: Status: RESOLVED | Noted: 2024-07-01 | Resolved: 2025-01-28

## 2025-01-28 PROBLEM — R97.20 ELEVATED PSA: Status: ACTIVE | Noted: 2025-01-28

## 2025-01-28 PROBLEM — F41.9 ANXIETY: Status: RESOLVED | Noted: 2022-08-25 | Resolved: 2025-01-28

## 2025-01-28 PROBLEM — Z91.81 RISK FOR FALLS: Status: RESOLVED | Noted: 2024-03-20 | Resolved: 2025-01-28

## 2025-01-28 PROBLEM — E78.00 PURE HYPERCHOLESTEROLEMIA: Chronic | Status: ACTIVE | Noted: 2025-01-28

## 2025-01-28 PROBLEM — F17.210 CIGARETTE NICOTINE DEPENDENCE WITHOUT COMPLICATION: Chronic | Status: ACTIVE | Noted: 2025-01-28

## 2025-01-28 PROBLEM — E66.9 OBESITY: Status: RESOLVED | Noted: 2022-08-25 | Resolved: 2025-01-28

## 2025-01-28 PROBLEM — I10 HTN (HYPERTENSION): Status: RESOLVED | Noted: 2022-08-23 | Resolved: 2025-01-28

## 2025-01-28 PROBLEM — E78.00 PURE HYPERCHOLESTEROLEMIA: Status: ACTIVE | Noted: 2025-01-28

## 2025-01-28 PROBLEM — F17.210 CIGARETTE NICOTINE DEPENDENCE WITHOUT COMPLICATION: Status: ACTIVE | Noted: 2025-01-28

## 2025-01-28 PROBLEM — M54.9 CHRONIC BACK PAIN: Chronic | Status: ACTIVE | Noted: 2022-08-25

## 2025-01-28 PROBLEM — I10 HYPERTENSION: Status: RESOLVED | Noted: 2022-08-25 | Resolved: 2025-01-28

## 2025-01-28 PROBLEM — M81.0 AGE-RELATED OSTEOPOROSIS WITHOUT CURRENT PATHOLOGICAL FRACTURE: Chronic | Status: ACTIVE | Noted: 2025-01-28

## 2025-01-28 PROBLEM — Z74.09 REDUCED MOBILITY: Status: RESOLVED | Noted: 2022-08-25 | Resolved: 2025-01-28

## 2025-01-28 PROBLEM — K59.09 CHRONIC CONSTIPATION: Status: ACTIVE | Noted: 2025-01-28

## 2025-01-28 PROBLEM — G45.9 TIA (TRANSIENT ISCHEMIC ATTACK): Chronic | Status: ACTIVE | Noted: 2025-01-28

## 2025-01-28 PROBLEM — D50.9 IRON DEFICIENCY ANEMIA: Chronic | Status: ACTIVE | Noted: 2025-01-28

## 2025-01-28 PROBLEM — R41.82 ALTERED MENTAL STATUS: Status: RESOLVED | Noted: 2024-07-09 | Resolved: 2025-01-28

## 2025-01-28 PROBLEM — K59.09 CHRONIC CONSTIPATION: Chronic | Status: ACTIVE | Noted: 2025-01-28

## 2025-01-28 PROBLEM — D50.9 IRON DEFICIENCY ANEMIA: Status: ACTIVE | Noted: 2025-01-28

## 2025-01-28 PROBLEM — F41.1 GENERALIZED ANXIETY DISORDER: Status: ACTIVE | Noted: 2025-01-28

## 2025-01-28 PROBLEM — F02.80 ALZHEIMER'S DISEASE: Chronic | Status: ACTIVE | Noted: 2022-08-25

## 2025-01-28 PROBLEM — R29.898 LEG WEAKNESS, BILATERAL: Status: RESOLVED | Noted: 2024-03-20 | Resolved: 2025-01-28

## 2025-01-28 PROBLEM — M81.0 AGE-RELATED OSTEOPOROSIS WITHOUT CURRENT PATHOLOGICAL FRACTURE: Status: ACTIVE | Noted: 2025-01-28

## 2025-01-28 PROBLEM — R97.20 ELEVATED PSA: Chronic | Status: ACTIVE | Noted: 2025-01-28

## 2025-01-28 PROBLEM — M79.2 NEUROPATHIC PAIN: Status: RESOLVED | Noted: 2021-11-28 | Resolved: 2025-01-28

## 2025-01-28 PROBLEM — G30.9 ALZHEIMER'S DISEASE: Chronic | Status: ACTIVE | Noted: 2022-08-25

## 2025-01-28 PROBLEM — L03.031 PARONYCHIA OF GREAT TOE, RIGHT: Status: RESOLVED | Noted: 2023-09-04 | Resolved: 2025-01-28

## 2025-01-28 PROBLEM — E78.5 HYPERLIPIDEMIA: Status: RESOLVED | Noted: 2022-08-25 | Resolved: 2025-01-28

## 2025-01-28 PROBLEM — R41.3 AMNESIA: Status: RESOLVED | Noted: 2022-08-25 | Resolved: 2025-01-28

## 2025-02-13 PROBLEM — R00.1 BRADYCARDIA: Chronic | Status: ACTIVE | Noted: 2025-02-13

## 2025-04-17 ENCOUNTER — LAB REQUISITION (OUTPATIENT)
Dept: LAB | Facility: HOSPITAL | Age: 80
End: 2025-04-17
Payer: MEDICARE

## 2025-04-17 ENCOUNTER — HOSPITAL ENCOUNTER (OUTPATIENT)
Facility: HOSPITAL | Age: 80
Discharge: NURSING, REHAB OR LONG-TERM CARE | End: 2025-04-22
Attending: EMERGENCY MEDICINE | Admitting: STUDENT IN AN ORGANIZED HEALTH CARE EDUCATION/TRAINING PROGRAM
Payer: MEDICARE

## 2025-04-17 DIAGNOSIS — N39.0 URINARY TRACT INFECTION, SITE NOT SPECIFIED: ICD-10-CM

## 2025-04-17 DIAGNOSIS — G30.9 ALZHEIMER'S DEMENTIA, UNSPECIFIED DEMENTIA SEVERITY, UNSPECIFIED TIMING OF DEMENTIA ONSET, UNSPECIFIED WHETHER BEHAVIORAL, PSYCHOTIC, OR MOOD DISTURBANCE OR ANXIETY: ICD-10-CM

## 2025-04-17 DIAGNOSIS — R41.82 AMS (ALTERED MENTAL STATUS): ICD-10-CM

## 2025-04-17 DIAGNOSIS — G93.40 ENCEPHALOPATHY, UNSPECIFIED TYPE: Primary | ICD-10-CM

## 2025-04-17 DIAGNOSIS — R07.9 CHEST PAIN: ICD-10-CM

## 2025-04-17 DIAGNOSIS — F02.80 ALZHEIMER'S DEMENTIA, UNSPECIFIED DEMENTIA SEVERITY, UNSPECIFIED TIMING OF DEMENTIA ONSET, UNSPECIFIED WHETHER BEHAVIORAL, PSYCHOTIC, OR MOOD DISTURBANCE OR ANXIETY: ICD-10-CM

## 2025-04-17 DIAGNOSIS — I50.9 CHF (CONGESTIVE HEART FAILURE): ICD-10-CM

## 2025-04-17 LAB
ACCEPTIBLE SP GR UR QL: 1.01 (ref 1–1.03)
ALBUMIN SERPL-MCNC: 3.7 G/DL (ref 3.4–4.8)
ALBUMIN SERPL-MCNC: 4.2 G/DL (ref 3.4–4.8)
ALBUMIN/GLOB SERPL: 0.9 RATIO (ref 1.1–2)
ALBUMIN/GLOB SERPL: 1 RATIO (ref 1.1–2)
ALLENS TEST BLOOD GAS (OHS): YES
ALP SERPL-CCNC: 100 UNIT/L (ref 40–150)
ALP SERPL-CCNC: 109 UNIT/L (ref 40–150)
ALT SERPL-CCNC: 11 UNIT/L (ref 0–55)
ALT SERPL-CCNC: 16 UNIT/L (ref 0–55)
AMPHET UR QL SCN: NEGATIVE
ANION GAP SERPL CALC-SCNC: 13 MEQ/L
ANION GAP SERPL CALC-SCNC: 8 MEQ/L
AST SERPL-CCNC: 17 UNIT/L (ref 11–45)
AST SERPL-CCNC: 29 UNIT/L (ref 11–45)
BACTERIA #/AREA URNS AUTO: NORMAL /HPF
BARBITURATE SCN PRESENT UR: NEGATIVE
BASE EXCESS BLD CALC-SCNC: 2.6 MMOL/L (ref -2–2)
BASOPHILS # BLD AUTO: 0.02 X10(3)/MCL
BASOPHILS # BLD AUTO: 0.04 X10(3)/MCL
BASOPHILS NFR BLD AUTO: 0.3 %
BASOPHILS NFR BLD AUTO: 0.6 %
BENZODIAZ UR QL SCN: NEGATIVE
BILIRUB SERPL-MCNC: 0.5 MG/DL
BILIRUB SERPL-MCNC: 0.5 MG/DL
BILIRUB UR QL STRIP.AUTO: NEGATIVE
BILIRUB UR QL STRIP.AUTO: NEGATIVE
BLOOD GAS SAMPLE TYPE (OHS): ABNORMAL
BUN SERPL-MCNC: 18.9 MG/DL (ref 8.4–25.7)
BUN SERPL-MCNC: 21.9 MG/DL (ref 8.4–25.7)
CA-I BLD-SCNC: 1.15 MMOL/L (ref 1.12–1.23)
CALCIUM SERPL-MCNC: 8.9 MG/DL (ref 8.8–10)
CALCIUM SERPL-MCNC: 9.1 MG/DL (ref 8.8–10)
CANNABINOIDS UR QL SCN: NEGATIVE
CHLORIDE SERPL-SCNC: 104 MMOL/L (ref 98–107)
CHLORIDE SERPL-SCNC: 105 MMOL/L (ref 98–107)
CLARITY UR: CLEAR
CLARITY UR: CLEAR
CO2 BLDA-SCNC: 28.5 MMOL/L
CO2 SERPL-SCNC: 23 MMOL/L (ref 23–31)
CO2 SERPL-SCNC: 26 MMOL/L (ref 23–31)
COCAINE UR QL SCN: NEGATIVE
COHGB MFR BLDA: 1.1 % (ref 0.5–1.5)
COLOR UR AUTO: NORMAL
COLOR UR AUTO: NORMAL
CREAT SERPL-MCNC: 1.23 MG/DL (ref 0.72–1.25)
CREAT SERPL-MCNC: 1.35 MG/DL (ref 0.72–1.25)
CREAT/UREA NIT SERPL: 15
CREAT/UREA NIT SERPL: 16
DRAWN BY BLOOD GAS (OHS): ABNORMAL
EOSINOPHIL # BLD AUTO: 0.2 X10(3)/MCL (ref 0–0.9)
EOSINOPHIL # BLD AUTO: 0.31 X10(3)/MCL (ref 0–0.9)
EOSINOPHIL NFR BLD AUTO: 3 %
EOSINOPHIL NFR BLD AUTO: 4.4 %
ERYTHROCYTE [DISTWIDTH] IN BLOOD BY AUTOMATED COUNT: 16 % (ref 11.5–17)
ERYTHROCYTE [DISTWIDTH] IN BLOOD BY AUTOMATED COUNT: 16 % (ref 11.5–17)
ETHANOL SERPL-MCNC: <10 MG/DL
FENTANYL UR QL SCN: NEGATIVE
FLUAV AG UPPER RESP QL IA.RAPID: NOT DETECTED
FLUBV AG UPPER RESP QL IA.RAPID: NOT DETECTED
GFR SERPLBLD CREATININE-BSD FMLA CKD-EPI: 53 ML/MIN/1.73/M2
GFR SERPLBLD CREATININE-BSD FMLA CKD-EPI: 60 ML/MIN/1.73/M2
GLOBULIN SER-MCNC: 3.9 GM/DL (ref 2.4–3.5)
GLOBULIN SER-MCNC: 4.2 GM/DL (ref 2.4–3.5)
GLUCOSE SERPL-MCNC: 86 MG/DL (ref 82–115)
GLUCOSE SERPL-MCNC: 94 MG/DL (ref 82–115)
GLUCOSE UR QL STRIP: NEGATIVE
GLUCOSE UR QL STRIP: NORMAL
HCO3 BLDA-SCNC: 27.2 MMOL/L (ref 22–26)
HCT VFR BLD AUTO: 35.8 % (ref 42–52)
HCT VFR BLD AUTO: 38 % (ref 42–52)
HGB BLD-MCNC: 11.5 G/DL (ref 14–18)
HGB BLD-MCNC: 12.2 G/DL (ref 14–18)
HGB UR QL STRIP: NEGATIVE
HGB UR QL STRIP: NEGATIVE
IMM GRANULOCYTES # BLD AUTO: 0.05 X10(3)/MCL (ref 0–0.04)
IMM GRANULOCYTES # BLD AUTO: 0.05 X10(3)/MCL (ref 0–0.04)
IMM GRANULOCYTES NFR BLD AUTO: 0.7 %
IMM GRANULOCYTES NFR BLD AUTO: 0.8 %
INHALED O2 CONCENTRATION: 2 %
KETONES UR QL STRIP: NEGATIVE
KETONES UR QL STRIP: NEGATIVE
LACTATE SERPL-SCNC: 1 MMOL/L (ref 0.5–2.2)
LEUKOCYTE ESTERASE UR QL STRIP: NEGATIVE
LEUKOCYTE ESTERASE UR QL STRIP: NEGATIVE
LYMPHOCYTES # BLD AUTO: 1.06 X10(3)/MCL (ref 0.6–4.6)
LYMPHOCYTES # BLD AUTO: 1.14 X10(3)/MCL (ref 0.6–4.6)
LYMPHOCYTES NFR BLD AUTO: 16 %
LYMPHOCYTES NFR BLD AUTO: 16.1 %
MAGNESIUM SERPL-MCNC: 1.94 MG/DL (ref 1.6–2.6)
MCH RBC QN AUTO: 31.3 PG (ref 27–31)
MCH RBC QN AUTO: 31.5 PG (ref 27–31)
MCHC RBC AUTO-ENTMCNC: 32.1 G/DL (ref 33–36)
MCHC RBC AUTO-ENTMCNC: 32.1 G/DL (ref 33–36)
MCV RBC AUTO: 97.5 FL (ref 80–94)
MCV RBC AUTO: 98.2 FL (ref 80–94)
MDMA UR QL SCN: NEGATIVE
METHGB MFR BLDA: 1 % (ref 0.4–1.5)
MONOCYTES # BLD AUTO: 0.65 X10(3)/MCL (ref 0.1–1.3)
MONOCYTES # BLD AUTO: 0.65 X10(3)/MCL (ref 0.1–1.3)
MONOCYTES NFR BLD AUTO: 9.1 %
MONOCYTES NFR BLD AUTO: 9.9 %
NEUTROPHILS # BLD AUTO: 4.61 X10(3)/MCL (ref 2.1–9.2)
NEUTROPHILS # BLD AUTO: 4.92 X10(3)/MCL (ref 2.1–9.2)
NEUTROPHILS NFR BLD AUTO: 69.2 %
NEUTROPHILS NFR BLD AUTO: 69.9 %
NITRITE UR QL STRIP: NEGATIVE
NITRITE UR QL STRIP: NEGATIVE
NRBC BLD AUTO-RTO: 0 %
O2 HB BLOOD GAS (OHS): 95.6 % (ref 94–97)
OPIATES UR QL SCN: NEGATIVE
OXYGEN DEVICE BLOOD GAS (OHS): ABNORMAL
OXYHGB MFR BLDA: 12 G/DL (ref 12–16)
PCO2 BLDA: 41 MMHG (ref 35–45)
PCP UR QL: NEGATIVE
PH BLDA: 7.43 [PH] (ref 7.35–7.45)
PH UR STRIP: 6.5 [PH]
PH UR STRIP: 7 [PH]
PH UR: 7 [PH] (ref 3–11)
PLATELET # BLD AUTO: 183 X10(3)/MCL (ref 130–400)
PLATELET # BLD AUTO: 210 X10(3)/MCL (ref 130–400)
PMV BLD AUTO: 10 FL (ref 7.4–10.4)
PMV BLD AUTO: 10.8 FL (ref 7.4–10.4)
PO2 BLDA: 89 MMHG (ref 80–100)
POCT GLUCOSE: 108 MG/DL (ref 70–110)
POTASSIUM BLOOD GAS (OHS): 4.2 MMOL/L (ref 3.5–5)
POTASSIUM SERPL-SCNC: 4.2 MMOL/L (ref 3.5–5.1)
POTASSIUM SERPL-SCNC: 5.1 MMOL/L (ref 3.5–5.1)
PROT SERPL-MCNC: 7.6 GM/DL (ref 5.8–7.6)
PROT SERPL-MCNC: 8.4 GM/DL (ref 5.8–7.6)
PROT UR QL STRIP: NEGATIVE
PROT UR QL STRIP: NEGATIVE
RBC # BLD AUTO: 3.67 X10(6)/MCL (ref 4.7–6.1)
RBC # BLD AUTO: 3.87 X10(6)/MCL (ref 4.7–6.1)
RBC #/AREA URNS AUTO: NORMAL /HPF
SAMPLE SITE BLOOD GAS (OHS): ABNORMAL
SAO2 % BLDA: 97.1 %
SARS-COV-2 RNA RESP QL NAA+PROBE: NOT DETECTED
SODIUM BLOOD GAS (OHS): 132 MMOL/L (ref 137–145)
SODIUM SERPL-SCNC: 139 MMOL/L (ref 136–145)
SODIUM SERPL-SCNC: 140 MMOL/L (ref 136–145)
SP GR UR STRIP.AUTO: 1.01 (ref 1–1.03)
SP GR UR STRIP.AUTO: <=1.005 (ref 1–1.03)
SQUAMOUS #/AREA URNS LPF: NORMAL /HPF
TROPONIN I SERPL-MCNC: <0.01 NG/ML (ref 0–0.04)
TSH SERPL-ACNC: 1.71 UIU/ML (ref 0.35–4.94)
UROBILINOGEN UR STRIP-ACNC: 0.2
UROBILINOGEN UR STRIP-ACNC: NORMAL
WBC # BLD AUTO: 6.59 X10(3)/MCL (ref 4.5–11.5)
WBC # BLD AUTO: 7.11 X10(3)/MCL (ref 4.5–11.5)
WBC #/AREA URNS AUTO: NORMAL /HPF

## 2025-04-17 PROCEDURE — 99900035 HC TECH TIME PER 15 MIN (STAT)

## 2025-04-17 PROCEDURE — 93005 ELECTROCARDIOGRAM TRACING: CPT

## 2025-04-17 PROCEDURE — 25000003 PHARM REV CODE 250: Performed by: EMERGENCY MEDICINE

## 2025-04-17 PROCEDURE — 83735 ASSAY OF MAGNESIUM: CPT | Performed by: EMERGENCY MEDICINE

## 2025-04-17 PROCEDURE — 82140 ASSAY OF AMMONIA: CPT | Performed by: NURSE PRACTITIONER

## 2025-04-17 PROCEDURE — 83550 IRON BINDING TEST: CPT | Performed by: STUDENT IN AN ORGANIZED HEALTH CARE EDUCATION/TRAINING PROGRAM

## 2025-04-17 PROCEDURE — 85025 COMPLETE CBC W/AUTO DIFF WBC: CPT | Performed by: EMERGENCY MEDICINE

## 2025-04-17 PROCEDURE — 0240U COVID/FLU A&B PCR: CPT | Performed by: EMERGENCY MEDICINE

## 2025-04-17 PROCEDURE — 27100171 HC OXYGEN HIGH FLOW UP TO 24 HOURS

## 2025-04-17 PROCEDURE — 82803 BLOOD GASES ANY COMBINATION: CPT

## 2025-04-17 PROCEDURE — 87086 URINE CULTURE/COLONY COUNT: CPT | Performed by: INTERNAL MEDICINE

## 2025-04-17 PROCEDURE — 85025 COMPLETE CBC W/AUTO DIFF WBC: CPT | Performed by: INTERNAL MEDICINE

## 2025-04-17 PROCEDURE — 36600 WITHDRAWAL OF ARTERIAL BLOOD: CPT

## 2025-04-17 PROCEDURE — 93010 ELECTROCARDIOGRAM REPORT: CPT | Mod: ,,, | Performed by: INTERNAL MEDICINE

## 2025-04-17 PROCEDURE — 96361 HYDRATE IV INFUSION ADD-ON: CPT

## 2025-04-17 PROCEDURE — 63600175 PHARM REV CODE 636 W HCPCS: Performed by: EMERGENCY MEDICINE

## 2025-04-17 PROCEDURE — 83880 ASSAY OF NATRIURETIC PEPTIDE: CPT | Performed by: EMERGENCY MEDICINE

## 2025-04-17 PROCEDURE — 82077 ASSAY SPEC XCP UR&BREATH IA: CPT | Performed by: EMERGENCY MEDICINE

## 2025-04-17 PROCEDURE — 80053 COMPREHEN METABOLIC PANEL: CPT | Performed by: INTERNAL MEDICINE

## 2025-04-17 PROCEDURE — 87040 BLOOD CULTURE FOR BACTERIA: CPT | Performed by: EMERGENCY MEDICINE

## 2025-04-17 PROCEDURE — 99285 EMERGENCY DEPT VISIT HI MDM: CPT | Mod: 25

## 2025-04-17 PROCEDURE — 82607 VITAMIN B-12: CPT | Performed by: STUDENT IN AN ORGANIZED HEALTH CARE EDUCATION/TRAINING PROGRAM

## 2025-04-17 PROCEDURE — 93010 ELECTROCARDIOGRAM REPORT: CPT | Mod: 76,,, | Performed by: INTERNAL MEDICINE

## 2025-04-17 PROCEDURE — 99900031 HC PATIENT EDUCATION (STAT)

## 2025-04-17 PROCEDURE — 81001 URINALYSIS AUTO W/SCOPE: CPT | Mod: XB | Performed by: EMERGENCY MEDICINE

## 2025-04-17 PROCEDURE — 82728 ASSAY OF FERRITIN: CPT | Performed by: STUDENT IN AN ORGANIZED HEALTH CARE EDUCATION/TRAINING PROGRAM

## 2025-04-17 PROCEDURE — 94760 N-INVAS EAR/PLS OXIMETRY 1: CPT | Mod: XB

## 2025-04-17 PROCEDURE — 27000190 HC CPAP FULL FACE MASK W/VALVE

## 2025-04-17 PROCEDURE — 94660 CPAP INITIATION&MGMT: CPT

## 2025-04-17 PROCEDURE — 96374 THER/PROPH/DIAG INJ IV PUSH: CPT

## 2025-04-17 PROCEDURE — 81003 URINALYSIS AUTO W/O SCOPE: CPT | Performed by: INTERNAL MEDICINE

## 2025-04-17 PROCEDURE — 84484 ASSAY OF TROPONIN QUANT: CPT | Performed by: EMERGENCY MEDICINE

## 2025-04-17 PROCEDURE — 84443 ASSAY THYROID STIM HORMONE: CPT | Performed by: EMERGENCY MEDICINE

## 2025-04-17 PROCEDURE — 83605 ASSAY OF LACTIC ACID: CPT | Performed by: EMERGENCY MEDICINE

## 2025-04-17 PROCEDURE — 82962 GLUCOSE BLOOD TEST: CPT

## 2025-04-17 PROCEDURE — 80053 COMPREHEN METABOLIC PANEL: CPT | Performed by: EMERGENCY MEDICINE

## 2025-04-17 PROCEDURE — 80307 DRUG TEST PRSMV CHEM ANLYZR: CPT | Performed by: EMERGENCY MEDICINE

## 2025-04-17 RX ORDER — FUROSEMIDE 10 MG/ML
40 INJECTION INTRAMUSCULAR; INTRAVENOUS
Status: COMPLETED | OUTPATIENT
Start: 2025-04-17 | End: 2025-04-17

## 2025-04-17 RX ADMIN — SODIUM CHLORIDE 1000 ML: 9 INJECTION, SOLUTION INTRAVENOUS at 08:04

## 2025-04-17 RX ADMIN — FUROSEMIDE 40 MG: 10 INJECTION, SOLUTION INTRAVENOUS at 09:04

## 2025-04-17 NOTE — ED NOTES
Assumed care of the patient. Patient is GCS 12 and disoriented x 4. The patient's wife reports he was walking and talking normally earlier today then woke him up from a nap at 1600 today and he was more confused and difficult to arouse. Denies any new medicines. . His wife reports the patient had a doctor's appointment today and they collected blood and urine.

## 2025-04-18 PROBLEM — D64.9 ANEMIA: Status: ACTIVE | Noted: 2025-04-18

## 2025-04-18 PROBLEM — G93.40 ENCEPHALOPATHY: Status: ACTIVE | Noted: 2025-04-18

## 2025-04-18 LAB
ALBUMIN SERPL-MCNC: 3.8 G/DL (ref 3.4–4.8)
ALBUMIN/GLOB SERPL: 1 RATIO (ref 1.1–2)
ALP SERPL-CCNC: 103 UNIT/L (ref 40–150)
ALT SERPL-CCNC: 12 UNIT/L (ref 0–55)
AMMONIA PLAS-MSCNC: 17.2 UMOL/L (ref 18–72)
ANION GAP SERPL CALC-SCNC: 10 MEQ/L
AST SERPL-CCNC: 20 UNIT/L (ref 11–45)
BASOPHILS # BLD AUTO: 0.03 X10(3)/MCL
BASOPHILS NFR BLD AUTO: 0.5 %
BILIRUB SERPL-MCNC: 0.6 MG/DL
BNP BLD-MCNC: 22.2 PG/ML
BUN SERPL-MCNC: 19.3 MG/DL (ref 8.4–25.7)
CALCIUM SERPL-MCNC: 9.1 MG/DL (ref 8.8–10)
CHLORIDE SERPL-SCNC: 103 MMOL/L (ref 98–107)
CO2 SERPL-SCNC: 25 MMOL/L (ref 23–31)
CREAT SERPL-MCNC: 1.09 MG/DL (ref 0.72–1.25)
CREAT/UREA NIT SERPL: 18
EOSINOPHIL # BLD AUTO: 0.16 X10(3)/MCL (ref 0–0.9)
EOSINOPHIL NFR BLD AUTO: 2.6 %
ERYTHROCYTE [DISTWIDTH] IN BLOOD BY AUTOMATED COUNT: 16 % (ref 11.5–17)
FERRITIN SERPL-MCNC: 444.61 NG/ML (ref 21.81–274.66)
FOLATE SERPL-MCNC: 17.7 NG/ML (ref 7–31.4)
GFR SERPLBLD CREATININE-BSD FMLA CKD-EPI: >60 ML/MIN/1.73/M2
GLOBULIN SER-MCNC: 4 GM/DL (ref 2.4–3.5)
GLUCOSE SERPL-MCNC: 96 MG/DL (ref 82–115)
HCT VFR BLD AUTO: 36.5 % (ref 42–52)
HGB BLD-MCNC: 11.6 G/DL (ref 14–18)
IMM GRANULOCYTES # BLD AUTO: 0.04 X10(3)/MCL (ref 0–0.04)
IMM GRANULOCYTES NFR BLD AUTO: 0.6 %
IRON SATN MFR SERPL: 12 % (ref 20–50)
IRON SERPL-MCNC: 27 UG/DL (ref 65–175)
LYMPHOCYTES # BLD AUTO: 1.12 X10(3)/MCL (ref 0.6–4.6)
LYMPHOCYTES NFR BLD AUTO: 18 %
MCH RBC QN AUTO: 30.6 PG (ref 27–31)
MCHC RBC AUTO-ENTMCNC: 31.8 G/DL (ref 33–36)
MCV RBC AUTO: 96.3 FL (ref 80–94)
MONOCYTES # BLD AUTO: 0.81 X10(3)/MCL (ref 0.1–1.3)
MONOCYTES NFR BLD AUTO: 13 %
NEUTROPHILS # BLD AUTO: 4.05 X10(3)/MCL (ref 2.1–9.2)
NEUTROPHILS NFR BLD AUTO: 65.3 %
NRBC BLD AUTO-RTO: 0 %
OHS QRS DURATION: 82 MS
OHS QRS DURATION: 82 MS
OHS QTC CALCULATION: 435 MS
OHS QTC CALCULATION: 435 MS
PLATELET # BLD AUTO: 189 X10(3)/MCL (ref 130–400)
PMV BLD AUTO: 10 FL (ref 7.4–10.4)
POTASSIUM SERPL-SCNC: 3.7 MMOL/L (ref 3.5–5.1)
PROT SERPL-MCNC: 7.8 GM/DL (ref 5.8–7.6)
RBC # BLD AUTO: 3.79 X10(6)/MCL (ref 4.7–6.1)
SODIUM SERPL-SCNC: 138 MMOL/L (ref 136–145)
TIBC SERPL-MCNC: 207 UG/DL (ref 60–240)
TIBC SERPL-MCNC: 234 UG/DL (ref 250–450)
TRANSFERRIN SERPL-MCNC: 206 MG/DL (ref 163–344)
VIT B12 SERPL-MCNC: 563 PG/ML (ref 213–816)
WBC # BLD AUTO: 6.21 X10(3)/MCL (ref 4.5–11.5)

## 2025-04-18 PROCEDURE — 82746 ASSAY OF FOLIC ACID SERUM: CPT | Performed by: STUDENT IN AN ORGANIZED HEALTH CARE EDUCATION/TRAINING PROGRAM

## 2025-04-18 PROCEDURE — G0378 HOSPITAL OBSERVATION PER HR: HCPCS

## 2025-04-18 PROCEDURE — 36415 COLL VENOUS BLD VENIPUNCTURE: CPT | Performed by: STUDENT IN AN ORGANIZED HEALTH CARE EDUCATION/TRAINING PROGRAM

## 2025-04-18 PROCEDURE — 80053 COMPREHEN METABOLIC PANEL: CPT | Performed by: STUDENT IN AN ORGANIZED HEALTH CARE EDUCATION/TRAINING PROGRAM

## 2025-04-18 PROCEDURE — 25000003 PHARM REV CODE 250: Performed by: NURSE PRACTITIONER

## 2025-04-18 PROCEDURE — 27100171 HC OXYGEN HIGH FLOW UP TO 24 HOURS

## 2025-04-18 PROCEDURE — 25000003 PHARM REV CODE 250: Performed by: STUDENT IN AN ORGANIZED HEALTH CARE EDUCATION/TRAINING PROGRAM

## 2025-04-18 PROCEDURE — 92610 EVALUATE SWALLOWING FUNCTION: CPT

## 2025-04-18 PROCEDURE — 25500020 PHARM REV CODE 255: Performed by: STUDENT IN AN ORGANIZED HEALTH CARE EDUCATION/TRAINING PROGRAM

## 2025-04-18 PROCEDURE — 25000003 PHARM REV CODE 250: Performed by: INTERNAL MEDICINE

## 2025-04-18 PROCEDURE — 99900035 HC TECH TIME PER 15 MIN (STAT)

## 2025-04-18 PROCEDURE — 51798 US URINE CAPACITY MEASURE: CPT

## 2025-04-18 PROCEDURE — 85025 COMPLETE CBC W/AUTO DIFF WBC: CPT | Performed by: STUDENT IN AN ORGANIZED HEALTH CARE EDUCATION/TRAINING PROGRAM

## 2025-04-18 PROCEDURE — 97162 PT EVAL MOD COMPLEX 30 MIN: CPT

## 2025-04-18 PROCEDURE — 97166 OT EVAL MOD COMPLEX 45 MIN: CPT

## 2025-04-18 PROCEDURE — 94760 N-INVAS EAR/PLS OXIMETRY 1: CPT

## 2025-04-18 RX ORDER — ASPIRIN 81 MG/1
81 TABLET ORAL DAILY
Status: DISCONTINUED | OUTPATIENT
Start: 2025-04-18 | End: 2025-04-22 | Stop reason: HOSPADM

## 2025-04-18 RX ORDER — ONDANSETRON HYDROCHLORIDE 2 MG/ML
4 INJECTION, SOLUTION INTRAVENOUS EVERY 4 HOURS PRN
Status: DISCONTINUED | OUTPATIENT
Start: 2025-04-18 | End: 2025-04-18

## 2025-04-18 RX ORDER — ACETAMINOPHEN 500 MG
1000 TABLET ORAL EVERY 6 HOURS PRN
Status: DISCONTINUED | OUTPATIENT
Start: 2025-04-18 | End: 2025-04-18

## 2025-04-18 RX ORDER — IBUPROFEN 200 MG
24 TABLET ORAL
Status: DISCONTINUED | OUTPATIENT
Start: 2025-04-18 | End: 2025-04-22 | Stop reason: HOSPADM

## 2025-04-18 RX ORDER — GLUCAGON 1 MG
1 KIT INJECTION
Status: DISCONTINUED | OUTPATIENT
Start: 2025-04-18 | End: 2025-04-22 | Stop reason: HOSPADM

## 2025-04-18 RX ORDER — PROCHLORPERAZINE EDISYLATE 5 MG/ML
5 INJECTION INTRAMUSCULAR; INTRAVENOUS EVERY 6 HOURS PRN
Status: DISCONTINUED | OUTPATIENT
Start: 2025-04-18 | End: 2025-04-18

## 2025-04-18 RX ORDER — LISINOPRIL 5 MG/1
5 TABLET ORAL DAILY
Status: DISCONTINUED | OUTPATIENT
Start: 2025-04-18 | End: 2025-04-22 | Stop reason: HOSPADM

## 2025-04-18 RX ORDER — BISACODYL 10 MG/1
10 SUPPOSITORY RECTAL DAILY PRN
Status: DISCONTINUED | OUTPATIENT
Start: 2025-04-18 | End: 2025-04-22 | Stop reason: HOSPADM

## 2025-04-18 RX ORDER — NALOXONE HCL 0.4 MG/ML
0.02 VIAL (ML) INJECTION
Status: DISCONTINUED | OUTPATIENT
Start: 2025-04-18 | End: 2025-04-22 | Stop reason: HOSPADM

## 2025-04-18 RX ORDER — SIMETHICONE 80 MG
1 TABLET,CHEWABLE ORAL 4 TIMES DAILY PRN
Status: DISCONTINUED | OUTPATIENT
Start: 2025-04-18 | End: 2025-04-22 | Stop reason: HOSPADM

## 2025-04-18 RX ORDER — IPRATROPIUM BROMIDE AND ALBUTEROL SULFATE 2.5; .5 MG/3ML; MG/3ML
3 SOLUTION RESPIRATORY (INHALATION) EVERY 6 HOURS PRN
Status: DISCONTINUED | OUTPATIENT
Start: 2025-04-18 | End: 2025-04-22 | Stop reason: HOSPADM

## 2025-04-18 RX ORDER — ONDANSETRON 4 MG/1
8 TABLET, ORALLY DISINTEGRATING ORAL EVERY 8 HOURS PRN
Status: DISCONTINUED | OUTPATIENT
Start: 2025-04-18 | End: 2025-04-22 | Stop reason: HOSPADM

## 2025-04-18 RX ORDER — ALUMINUM HYDROXIDE, MAGNESIUM HYDROXIDE, AND SIMETHICONE 1200; 120; 1200 MG/30ML; MG/30ML; MG/30ML
30 SUSPENSION ORAL 4 TIMES DAILY PRN
Status: DISCONTINUED | OUTPATIENT
Start: 2025-04-18 | End: 2025-04-22 | Stop reason: HOSPADM

## 2025-04-18 RX ORDER — ENOXAPARIN SODIUM 100 MG/ML
40 INJECTION SUBCUTANEOUS EVERY 24 HOURS
Status: DISCONTINUED | OUTPATIENT
Start: 2025-04-18 | End: 2025-04-22 | Stop reason: HOSPADM

## 2025-04-18 RX ORDER — ATORVASTATIN CALCIUM 40 MG/1
40 TABLET, FILM COATED ORAL DAILY
Status: DISCONTINUED | OUTPATIENT
Start: 2025-04-18 | End: 2025-04-22 | Stop reason: HOSPADM

## 2025-04-18 RX ORDER — LANOLIN ALCOHOL/MO/W.PET/CERES
1 CREAM (GRAM) TOPICAL DAILY
Status: DISCONTINUED | OUTPATIENT
Start: 2025-04-18 | End: 2025-04-22 | Stop reason: HOSPADM

## 2025-04-18 RX ORDER — ACETAMINOPHEN 325 MG/1
650 TABLET ORAL EVERY 4 HOURS PRN
Status: DISCONTINUED | OUTPATIENT
Start: 2025-04-18 | End: 2025-04-22 | Stop reason: HOSPADM

## 2025-04-18 RX ORDER — POLYETHYLENE GLYCOL 3350 17 G/17G
17 POWDER, FOR SOLUTION ORAL 3 TIMES DAILY PRN
Status: DISCONTINUED | OUTPATIENT
Start: 2025-04-18 | End: 2025-04-22 | Stop reason: HOSPADM

## 2025-04-18 RX ORDER — ONDANSETRON HYDROCHLORIDE 2 MG/ML
4 INJECTION, SOLUTION INTRAVENOUS EVERY 8 HOURS PRN
Status: DISCONTINUED | OUTPATIENT
Start: 2025-04-18 | End: 2025-04-22 | Stop reason: HOSPADM

## 2025-04-18 RX ORDER — MEMANTINE HYDROCHLORIDE 5 MG/1
10 TABLET ORAL 2 TIMES DAILY
Status: DISCONTINUED | OUTPATIENT
Start: 2025-04-18 | End: 2025-04-22 | Stop reason: HOSPADM

## 2025-04-18 RX ORDER — ACETAMINOPHEN 325 MG/1
650 TABLET ORAL EVERY 4 HOURS PRN
Status: DISCONTINUED | OUTPATIENT
Start: 2025-04-18 | End: 2025-04-18

## 2025-04-18 RX ORDER — TALC
9 POWDER (GRAM) TOPICAL NIGHTLY PRN
Status: DISCONTINUED | OUTPATIENT
Start: 2025-04-18 | End: 2025-04-22 | Stop reason: HOSPADM

## 2025-04-18 RX ORDER — SODIUM CHLORIDE 0.9 % (FLUSH) 0.9 %
10 SYRINGE (ML) INJECTION
Status: DISCONTINUED | OUTPATIENT
Start: 2025-04-18 | End: 2025-04-18

## 2025-04-18 RX ORDER — SODIUM CHLORIDE 0.9 % (FLUSH) 0.9 %
10 SYRINGE (ML) INJECTION
Status: DISCONTINUED | OUTPATIENT
Start: 2025-04-18 | End: 2025-04-22 | Stop reason: HOSPADM

## 2025-04-18 RX ORDER — TAMSULOSIN HYDROCHLORIDE 0.4 MG/1
0.4 CAPSULE ORAL DAILY
Status: DISCONTINUED | OUTPATIENT
Start: 2025-04-18 | End: 2025-04-22 | Stop reason: HOSPADM

## 2025-04-18 RX ORDER — IBUPROFEN 200 MG
16 TABLET ORAL
Status: DISCONTINUED | OUTPATIENT
Start: 2025-04-18 | End: 2025-04-22 | Stop reason: HOSPADM

## 2025-04-18 RX ADMIN — ACETAMINOPHEN 650 MG: 325 TABLET ORAL at 04:04

## 2025-04-18 RX ADMIN — FERROUS SULFATE TAB 325 MG (65 MG ELEMENTAL FE) 1 EACH: 325 (65 FE) TAB at 09:04

## 2025-04-18 RX ADMIN — PERFLUTREN 1 ML: 6.52 INJECTION, SUSPENSION INTRAVENOUS at 05:04

## 2025-04-18 RX ADMIN — ASPIRIN 81 MG: 81 TABLET, COATED ORAL at 09:04

## 2025-04-18 RX ADMIN — TAMSULOSIN HYDROCHLORIDE 0.4 MG: 0.4 CAPSULE ORAL at 08:04

## 2025-04-18 RX ADMIN — ATORVASTATIN CALCIUM 40 MG: 40 TABLET, FILM COATED ORAL at 09:04

## 2025-04-18 RX ADMIN — MEMANTINE 10 MG: 5 TABLET ORAL at 08:04

## 2025-04-18 RX ADMIN — LISINOPRIL 5 MG: 5 TABLET ORAL at 09:04

## 2025-04-18 RX ADMIN — MEMANTINE 10 MG: 5 TABLET ORAL at 09:04

## 2025-04-18 RX ADMIN — MULTIVITAMIN TABLET 1 TABLET: TABLET at 09:04

## 2025-04-18 RX ADMIN — SERTRALINE HYDROCHLORIDE 75 MG: 50 TABLET ORAL at 09:04

## 2025-04-18 NOTE — PT/OT/SLP EVAL
"[facility]  Speech Language Pathology Department  Clinical Swallow Evaluation    Patient Name:  Rojas Foster   MRN:  [id number]    Recommendations     General recommendations:  SLP follow up regarding diet tolerance  Solid texture recommendation:  Soft & Bite Sized Diet - IDDSI Level 6  Liquid consistency recommendation: Thin liquids - IDDSI Level 0   Medications: crushed in puree  Swallow strategies/precautions: small bites/sips, slow rate, upright for PO intake, and assist with feeding as needed  Precautions: fall    History     Rojas Foster is a/n 80 y.o. male admitted for increased lethargy. SLP consulted for oral holding with pills this morning.    Past Medical History:   Diagnosis Date    Alzheimer's disease, unspecified (CODE)     Anxiety disorder, unspecified     Arthritis     Elevated PSA 01/28/2025    HTN (hypertension)      Past Surgical History:   Procedure Laterality Date    BACK SURGERY      SHOULDER SURGERY      SHOULDER SURGERY Left        Home diet texture/consistency: Regular and thin liquids (per wife)  Current method of nutrition: NPO    Patient complaint: "I'm so hungry I could eat a horse"    Imaging   Results for orders placed during the hospital encounter of 04/17/25    X-Ray Chest 1 View    Narrative  EXAMINATION  XR CHEST 1 VIEW    CLINICAL HISTORY  Altered mental status, unspecified    TECHNIQUE  A total of 1 frontal image(s) of the chest.    COMPARISON  8 December 2024    FINDINGS  Lines/tubes/devices: ECG leads overlie the imaged region.    There is similar enlargement of the cardiac silhouette and associated central vascular congestion.  The trachea is midline. There is no lobar consolidation, pleural effusion, or pneumothorax.  Left hemidiaphragm elevation is unchanged.    Degenerative change of the regional osseous structures and left glenohumeral arthroplasty components are similar.    IMPRESSION  1. Similar cardiomegaly and central vascular " congestion.  2. No evidence of acute intrathoracic process.  3. Additional chronic findings discussed above, similar in the interval.      Electronically signed by: Aman James  Date:    04/17/2025  Time:    20:22    No results found for this or any previous visit.    Results for orders placed during the hospital encounter of 05/16/24    MRI Brain Without Contrast    Narrative  EXAMINATION:  MRI BRAIN WITHOUT CONTRAST    CLINICAL HISTORY:  Memory loss;Mental status change, unknown cause;Other amnesia    TECHNIQUE:  Multiplanar MRI sequences were performed of the brain without contrast.    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  There is generalized cerebral and cerebellar cortical volume loss. Gradient echo sequences demonstrate no evidence of de phasing artifact to suggest hemorrhagic byproducts. No evidence of diffusion restriction or ADC map signal drop out to suggest acute infarct.  There is partially empty sella.    The cerebellar tonsils are normally positioned. There is no acute intracranial hemorrhage, hydrocephalus, midline shift or mass effect. No acute extra axial fluid collections identified. The mastoid air cells are clear.    Impression  1.  No acute intracranial findings identified.    2.  Severe chronic microvascular ischemia and atrophy.      Electronically signed by: Andi Alcazar  Date:    05/16/2024  Time:    12:00    Subjective     Patient awake and pleasant .    Patient goals: to eat/drink   Spiritual/Cultural/Zoroastrianism Beliefs/Practices that affect care: no    Pain/Comfort: Pain Rating 1: 0/10    Respiratory Status:  room air    Restraints/positioning devices: none    Objective     ORAL MUSCULATURE  Dentition: upper dentures  Secretion Management: adequate  Mucosal Quality: good  Facial Movement: WFL    PO  TRIALS  Consistency Fed By Oral Symptoms Pharyngeal Symptoms   Thin liquid by straw Self with assistance Intermittent holding None   Puree Self Intermittent holding, complete oral clearance with one swallow None   Chewable solid Self Prolonged bolus formation/mastication None     Wife reports difficulties swallowing pills at home due to oral holding. Seeing a home health SLP.    Assessment     Patient tolerate PO trials with no signs/sx of aspiration and clear vocal quality. SLP rec: Soft and bite sized solids, thin liquids, meds crushed in puree    Outcome Measures     Functional Oral Intake Scale: 5 - Total oral diet with multiple consistencies, by requiring special preparation or compensations    Education     Patient and spouse were provided with verbal education regarding POC.  Understanding was verbalized.    Plan     SLP Follow-Up:  Yes   Plan of Care reviewed with:  patient, spouse     Time Tracking     SLP Treatment Date:   04/18/25  Speech Start Time:  1100  Speech Stop Time:  1115     Speech Total Time (min):  15 min    Billable minutes:  Swallow and Oral Function Evaluation, 15 minutes     04/18/2025

## 2025-04-18 NOTE — PT/OT/SLP EVAL
Occupational Therapy  Evaluation    Name: Rojas Foster  MRN: [id number]      Recommendations:     Discharge therapy intensity: Moderate Intensity Therapy   Discharge Equipment Recommendations:  to be determined by next level of care  Barriers to discharge:   (ongoing medical needs, severity of deficits)    Assessment:     Rojas Foster is a 80 y.o. male with a medical diagnosis of acute encephalopathy, acute respiratory failure, hypertension urgency, nonobstructive hydrocephalus, Alzheimer's, dementia. He presents with the following performance deficits affecting function: impaired endurance, weakness, impaired self care skills, impaired functional mobility, gait instability, impaired balance, decreased upper extremity function, decreased lower extremity function, decreased coordination, visual deficits, decreased safety awareness. Patient tolerated OT eval fair; oriented to self only. Patient significantly deconditioned when compared to PLOF. Currently requires maxAx2 for all functional mobility and maxA for self care tasks. Chronic LUE deficits present. Wife reports that patient requires min-modA for UBD and grooming, SPV/CGA for ambulating to/from restroom with RW, and otherwise requires maxA for ADLs. Will recommend moderate intensity therapy upon d/c.    Rehab Prognosis: Fair; patient would benefit from acute skilled OT services to address these deficits and reach maximum level of function.       Plan:     Patient to be seen 3 x/week to address the above listed problems via self-care/home management, therapeutic activities, therapeutic exercises  Plan of Care Expires: 05/18/25  Plan of Care Reviewed with: patient, spouse    Subjective     Chief Complaint: none stated  Patient/Family Comments/goals: none stated    Occupational Profile:  Living Environment: lives with wife in a [facility], no DAVINA; walk in shower  Previous level of function: min-modA for UBD and grooming, SPV/CGA for ambulating to/from restroom with  "RW, and otherwise requires maxA for ADLs  Roles and Routines:   Equipment Used at Home: shower chair, walker, rolling, wheelchair  Assistance upon Discharge: TBD - wife would like placement    Pain/Comfort:  Pain Rating 1: 0/10    Patients cultural, spiritual, Alevism conflicts given the current situation: no    Objective:     OT communicated with NSG prior to session.      Patient was found HOB elevated with PureWick, peripheral IV, pulse ox (continuous), telemetry upon OT entry to room.    General Precautions: Standard, fall  Orthopedic Precautions: N/A  Braces: N/A    Vital Signs: Supplemental 02: nasal cannula 2L    Bed Mobility:    Patient completed Supine to Sit with maximal assistance and 2 persons  Patient completed Sit to Supine with maximal assistance and 2 persons    Functional Mobility/Transfers:  Patient completed Sit <> Stand Transfer with maximal assistance and of 2 persons  with  hand-held assist     Activities of Daily Living:  Lower Body Dressing: maximal assistance to don socks    AMPAC 6 Click ADL:  AMPAC Total Score: 13    Functional Cognition:  Orientation: oriented to Person  Command Following: intermittently following commands; inconsistent  Insight into Deficits: Impaired.      Visual Perceptual Skills:  Chronic visual deficits; +visual startle    Upper Extremity Function:  Right Upper Extremity:   Range of Motion: WFL    Left Upper Extremity:  Tone with elbow flexion; otherwise PROM WNL, did not follow commands other than "squeeze my hand"      Therapeutic Positioning  Risk for acquired pressure injuries is increased due to relative decrease in mobility d/t hospitalization  and impaired mobility.    OT interventions performed during the course of today's session:   Education was provided on benefits of and recommendations for therapeutic positioning  Therapeutic positioning was provided at the conclusion of session to offload all bony prominences for the prevention and/or " reduction of pressure injuries    Skin assessment: all bony prominences were assessed    Findings:  redness on bottom and scrotum    OT recommendations for therapeutic positioning throughout hospitalization:   Follow [facility] Pressure Injury Prevention Protocol  Geomat recommended for sacral protection while UIC  *watch for bed needs*    Patient Education:  Patient and spouse were provided with verbal education education regarding OT role/goals/POC, fall prevention, safety awareness, Discharge/DME recommendations, and pressure ulcer prevention.  Understanding was verbalized, however additional teaching warranted.     Patient left with bed in chair position with all lines intact, call button in reach, bed alarm on, NSG notified, and wife present.    GOALS:   Multidisciplinary Problems       Occupational Therapy Goals          Problem: Occupational Therapy    Goal Priority Disciplines Outcome Interventions   Occupational Therapy Goal     OT, PT/OT Progressing    Description: goals to be met by 5/18/25    Pt will complete grooming standing at sink with LRAD with CGA.  Pt will complete UB dressing with Liliane.  Pt will complete LB dressing with modA using LRAD and AE PRN.  Pt will complete functional mobility to/from toilet and toilet transfer with CGA using LRAD.                        History:     Past Medical History:   Diagnosis Date    Alzheimer's disease, unspecified (CODE)     Anxiety disorder, unspecified     Arthritis     Elevated PSA 01/28/2025    HTN (hypertension)          Past Surgical History:   Procedure Laterality Date    BACK SURGERY      SHOULDER SURGERY      SHOULDER SURGERY Left        Time Tracking:     OT Date of Treatment:    OT Start Time: 0759  OT Stop Time: 0814  OT Total Time (min): 15 min    Billable Minutes:Evaluation mod    4/18/2025

## 2025-04-18 NOTE — ED PROVIDER NOTES
Encounter Date: 4/17/2025    SCRIBE #1 NOTE: I, Kierra Ordonez, am scribing for, and in the presence of,  Luis Manuel Bernal MD. I have scribed the following portions of the note - Other sections scribed: HPI, ROS, PE.       History     Chief Complaint   Patient presents with    Lethargic     Arrives via [facility] with reports of lethargy x 2 days. Reports increased lethargy this afternoon.      Patient is a 80 year old male with a history of Alzheimers, TIA, HLD and HTN who comes into the ED with lethargy for two days per spouse. Patient's spouse states he was able to hold a conversation yesterday and walk with his walker, but this afternoon about 1600 he worsened. Patient's spouse reports he has a cough that started today. Patient's spouse states the home health nurse noticed his symptoms and recommended they come here for further examination. Patient's spouse reports the patient was put on a higher dose of his cholesterol medication about 2 months ago. Patient's spouses denies a fever.    The history is provided by the spouse and medical records.     Review of patient's allergies indicates:   Allergen Reactions    Codeine Other (See Comments)     Other reaction(s): Confusion     Past Medical History:   Diagnosis Date    Alzheimer's disease, unspecified (CODE)     Anxiety disorder, unspecified     Arthritis     Elevated PSA 01/28/2025    HTN (hypertension)      Past Surgical History:   Procedure Laterality Date    BACK SURGERY      SHOULDER SURGERY      SHOULDER SURGERY Left      Family History   Problem Relation Name Age of Onset    Heart disease Mother      Hypertension Mother      Hypertension Father       Social History[1]  Review of Systems   Unable to perform ROS: Mental status change       Physical Exam     Initial Vitals [04/17/25 1735]   BP Pulse Resp Temp SpO2   (!) 130/90 92 18 99.3 °F (37.4 °C) 97 %      MAP       --         Physical Exam    Constitutional: He appears well-developed and well-nourished. He  appears lethargic.   HENT:   Head: Normocephalic and atraumatic.   Eyes:   Crusting to the eyelashes.   Cardiovascular:  Normal rate.           Pulmonary/Chest: Tachypnea noted. He has no wheezes. He has no rhonchi. He exhibits no tenderness.   Abdominal: Abdomen is soft. He exhibits no distension. There is no abdominal tenderness. There is no rebound and no guarding.   Musculoskeletal:         General: Normal range of motion.     Neurological: He appears lethargic.   Groans  Follows commands   Skin: Skin is warm and dry.         ED Course   Procedures  Labs Reviewed   COMPREHENSIVE METABOLIC PANEL - Abnormal       Result Value    Sodium 139      Potassium 4.2      Chloride 105      CO2 26      Glucose 94      Blood Urea Nitrogen 18.9      Creatinine 1.23      Calcium 8.9      Protein Total 7.6      Albumin 3.7      Globulin 3.9 (*)     Albumin/Globulin Ratio 0.9 (*)     Bilirubin Total 0.5            ALT 11      AST 17      eGFR 60      Anion Gap 8.0      BUN/Creatinine Ratio 15     CBC WITH DIFFERENTIAL - Abnormal    WBC 6.59      RBC 3.67 (*)     Hgb 11.5 (*)     Hct 35.8 (*)     MCV 97.5 (*)     MCH 31.3 (*)     MCHC 32.1 (*)     RDW 16.0      Platelet 183      MPV 10.0      Neut % 69.9      Lymph % 16.1      Mono % 9.9      Eos % 3.0      Basophil % 0.3      Imm Grans % 0.8      Neut # 4.61      Lymph # 1.06      Mono # 0.65      Eos # 0.20      Baso # 0.02      Imm Gran # 0.05 (*)     NRBC% 0.0     BLOOD GAS - Abnormal    Sample Type Arterial Blood      Sample site Left Radial Artery      Drawn by ERLINDA RRT      pH, Blood gas 7.430      pCO2, Blood gas 41.0      pO2, Blood gas 89.0      Sodium, Blood Gas 132 (*)     Potassium, Blood Gas 4.2      Calcium Level Ionized 1.15      TOC2, Blood gas 28.5      Base Excess, Blood gas 2.60 (*)     sO2, Blood gas 97.1      HCO3, Blood gas 27.2 (*)     THb, Blood gas 12.0      O2 Hb, Blood Gas 95.6      CO Hgb 1.1      Met Hgb 1.0      Allens Test Yes      Oxygen  Device, Blood gas Cannula      FIO2, Blood gas 2     COVID/FLU A&B PCR - Normal    Influenza A PCR Not Detected      Influenza B PCR Not Detected      SARS-CoV-2 PCR Not Detected      Narrative:     The Xpert Xpress SARS-CoV-2/FLU/RSV plus is a rapid, multiplexed real-time PCR test intended for the simultaneous qualitative detection and differentiation of SARS-CoV-2, Influenza A, Influenza B, and respiratory syncytial virus (RSV) viral RNA in either nasopharyngeal swab or nasal swab specimens.         DRUG SCREEN, URINE (BEAKER) - Normal    Amphetamines, Urine Negative      Barbiturates, Urine Negative      Benzodiazepine, Urine Negative      Cannabinoids, Urine Negative      Cocaine, Urine Negative      Fentanyl, Urine Negative      MDMA, Urine Negative      Opiates, Urine Negative      Phencyclidine, Urine Negative      pH, Urine 7.0      Specific Gravity, Urine Auto 1.010      Narrative:     Cut off concentrations:    Amphetamines - 1000 ng/ml  Barbiturates - 200 ng/ml  Benzodiazepine - 200 ng/ml  Cannabinoids (THC) - 50 ng/ml  Cocaine - 300 ng/ml  Fentanyl - 1.0 ng/ml  MDMA - 500 ng/ml  Opiates - 300 ng/ml   Phencyclidine (PCP) - 25 ng/ml    Specimen submitted for drug analysis and tested for pH and specific gravity in order to evaluate sample integrity. Suspect tampering if specific gravity is <1.003 and/or pH is not within the range of 4.5 - 8.0  False negatives may result form substances such as bleach added to urine.  False positives may result for the presence of a substance with similar chemical structure to the drug or its metabolite.    This test provides only a PRELIMINARY analytical test result. A more specific alternate chemical method must be used in order to obtain a confirmed analytical result. Gas chromatography/mass spectrometry (GC/MS) is the preferred confirmatory method. Other chemical confirmation methods are available. Clinical consideration and professional judgement should be applied to  any drug of abuse test result, particularly when preliminary positive results are used.    Positive results will be confirmed only at the physicians request. Unconfirmed screening results are to be used only for medical purposes (treatment).        ALCOHOL,MEDICAL (ETHANOL) - Normal    Ethanol Level <10.0     LACTIC ACID, PLASMA - Normal    Lactic Acid Level 1.0     MAGNESIUM - Normal    Magnesium Level 1.94     TROPONIN I - Normal    Troponin-I <0.010     TSH - Normal    TSH 1.713     URINALYSIS, REFLEX TO URINE CULTURE - Normal    Color, UA Light-Yellow      Appearance, UA Clear      Specific Gravity, UA 1.010      pH, UA 7.0      Protein, UA Negative      Glucose, UA Normal      Ketones, UA Negative      Blood, UA Negative      Bilirubin, UA Negative      Urobilinogen, UA Normal      Nitrites, UA Negative      Leukocyte Esterase, UA Negative      RBC, UA 0-5      WBC, UA 0-5      Bacteria, UA None Seen      Squamous Epithelial Cells, UA None Seen     BLOOD CULTURE OLG   BLOOD CULTURE OLG   CBC W/ AUTO DIFFERENTIAL    Narrative:     The following orders were created for panel order CBC Auto Differential.  Procedure                               Abnormality         Status                     ---------                               -----------         ------                     CBC with Differential[[id number]]       Abnormal            Final result                 Please view results for these tests on the individual orders.   B-TYPE NATRIURETIC PEPTIDE   AMMONIA   POCT GLUCOSE    POCT Glucose 108            Imaging Results              CT Head Without Contrast (Preliminary result)  Result time 04/17/25 22:14:23      Preliminary result by Robert Turner MD (04/17/25 22:14:23)                   Narrative:    START OF REPORT:  Technique: CT of the head was performed without intravenous contrast with axial as well as coronal and sagittal images.    Comparison: Comparison is with study dated 2024-12-08  15:57:38.    Dosage Information: Automated Exposure Control was utilized 989.93 mGy.cm.    Clinical history: AMS, unknown cause.    Findings:  Hemorrhage: No acute intracranial hemorrhage is seen.  CSF spaces: The ventricles, sulci and basal cisterns demonstrates stable mild prominence consistent with global cerebral atrophy. The ventricles appear dilated out of proportion to the sulci suggesting an element of stable appearing non-obstructive hydrocephalus.  Brain parenchyma: There is preservation of the grey white junction throughout. No acute infarct. Pronounced stable appearing microvascular change is seen in portions of the periventricular and deep white matter tracts. Follow-up as clinically indicated.  Cerebellum: Unremarkable.  Vascular: Unremarkable venous sinuses. Mild atheromatous calcification of the intracranial arteries is seen.  Sella and skull base: The sella appears to be within normal limits for age.  Cerebellopontine angles: Within normal limits.  Herniation: None.  Intracranial calcifications: Incidental note is made of bilateral choroid plexus calcification. Incidental note is made of some pineal region calcification.  Calvarium: No acute linear or depressed skull fracture is seen.    Maxillofacial Structures:  Paranasal sinuses: The visualized paranasal sinuses appear clear with no significant mucoperiosteal thickening or air fluid levels identified.  Orbits: The orbits appear unremarkable.  Zygomatic arches: The zygomatic arches are intact and unremarkable.  Temporal bones and mastoids: The temporal bones and mastoids appear unremarkable.  TMJ: The mandibular condyles appear normally placed with respect to the mandibular fossa.      Impression:  1. No acute intracranial process identified. Details and other findings as noted above.                                         X-Ray Chest 1 View (Final result)  Result time 04/17/25 20:22:56      Final result by Aman James MD (04/17/25 20:22:56)                    Narrative:    EXAMINATION  XR CHEST 1 VIEW    CLINICAL HISTORY  Altered mental status, unspecified    TECHNIQUE  A total of 1 frontal image(s) of the chest.    COMPARISON  8 December 2024    FINDINGS  Lines/tubes/devices: ECG leads overlie the imaged region.    There is similar enlargement of the cardiac silhouette and associated central vascular congestion.  The trachea is midline. There is no lobar consolidation, pleural effusion, or pneumothorax.  Left hemidiaphragm elevation is unchanged.    Degenerative change of the regional osseous structures and left glenohumeral arthroplasty components are similar.    IMPRESSION  1. Similar cardiomegaly and central vascular congestion.  2. No evidence of acute intrathoracic process.  3. Additional chronic findings discussed above, similar in the interval.      Electronically signed by: Aman James  Date:    04/17/2025  Time:    20:22                                     Medications   sodium chloride 0.9% bolus 1,000 mL 1,000 mL (0 mLs Intravenous Stopped 4/17/25 2200)   furosemide injection 40 mg (40 mg Intravenous Given 4/17/25 2126)     Medical Decision Making  The differential diagnosis includes, but is not limited to, pneumonia, sepsis, Alzheimer's, UTI, dehydration, hypoxia, MI, CVA, and hypercapnia.    Amount and/or Complexity of Data Reviewed  Labs: ordered.  Radiology: ordered and independent interpretation performed.  ECG/medicine tests: ordered and independent interpretation performed.    Risk  Prescription drug management.            Scribe Attestation:   Scribe #1: I performed the above scribed service and the documentation accurately describes the services I performed. I attest to the accuracy of the note.    Attending Attestation:           Physician Attestation for Scribe:  Physician Attestation Statement for Scribe #1: I, Luis Manuel Bernal MD, reviewed documentation, as scribed by Kierra Ordonez in my presence, and it is both accurate and  complete.             ED Course as of 04/17/25 2339   Thu Apr 17, 2025 2105 According to records patient is on a CPAP at home for sleep apnea will start CPAP here [LF]   2208 Chest x-ray is concerning for vascular congestion.  Initially given IV fluids when he arrived whenever will actually give a dose of Lasix now [LF]   2337 I discussed with Madiha of the hospitalist.  They will observe him.  We will need to hold medications in case polypharmacy is contributing.  The only recent medication changes that has statin was doubled he is on tramadol pregabalin sertraline which maybe contributing [LF]      ED Course User Index  [LF] Luis Manuel Bernal MD                           Clinical Impression:  Final diagnoses:  [R41.82] AMS (altered mental status)  [G93.40] Encephalopathy, unspecified type (Primary)  [G30.9, F02.80] Alzheimer's dementia, unspecified dementia severity, unspecified timing of dementia onset, unspecified whether behavioral, psychotic, or mood disturbance or anxiety          ED Disposition Condition    Observation                   [1]   Social History  Tobacco Use    Smoking status: Never    Smokeless tobacco: Current     Types: Chew   Substance Use Topics    Alcohol use: Not Currently    Drug use: Never        Luis Manuel Bernal MD  04/17/25 2547

## 2025-04-18 NOTE — PROGRESS NOTES
[facility] Hospital Medicine Progress Note        Chief Complaint: Inpatient Follow-up for weakness     HPI:     Rojas Foster is a 80 y.o. male who PMH includes Alzheimers dementia; anxiety, depression, chronic arthritis, HTN, HLD, TIA; presents to the ED at [facility] on 4/17/2025 with a primary complaint of per family reports of lethargy with worsening AMS and confusion. PT was seen by home health services today and was directed to come to the ED for further evaluation. Family reports pt did have cough, no reports of fever, vomiting, diarrhea or any known sick contacts.  Patient is a poor historian and can not provide any historical information at this time.  Wife is at the bedside providing majority of the history.  Lab work reviewed demonstrated H&H 11.5/35.8, other indices unremarkable.  Chest x-ray impression reviewed demonstrated cardiomegaly with central vascular congestion, no evidence of acute intrathoracic process.  CT of head without contrast impression reviewed demonstrated no acute intracranial process identified; the ventricles, sulci and basal cisterns demonstrates stable mild prominence consistent with global cerebral atrophy, ventricles appear dilated out of proportion to the sulci suggestion an element of stable appearing nonobstructive hydrocephalus.  Initial vital signs /90 pulse 92 respirations 18 temperature 99.3° F O2 saturation 97% on 2 L per nasal cannula.  Patient did have slight drop in saturations which he was placed on CPAP therapy with improvement in oxygenation and respiratory status.  Patient was still semi lethargic at the time of examination and rounds. Wife reports he has not woken up much since she found his altered prior to arrival in the ED. Pt is admitted to hospital medicine services for further management.     Interval Hx:   Patient today awake and comfortable. Oriented only to self. Has been afebrile. Son at bedside. States his Parents  live right next door. His father has been having episodes of fluctuation in mental status and past few days has become very weak. Usually patient can ambulate and go to the rest room.     Son is worried that his father is declining. Wants to see how he does with OT/PT, if he does well then he will take him home with HH, set up day care center visits but if he is not ambulating much then will want a SNF placement.     Case was discussed with patient's nurse and  on the floor.    Objective/physical exam:  General: In no acute distress, in a good mood.   Chest: Clear to auscultation bilaterally  Heart: RRR, +S1, S2, no appreciable murmur  Abdomen: Soft, nontender, BS +  Neurologic: Cranial nerve II-XII intact, Strength 5/5 in all 4 extremities    VITAL SIGNS: 24 HRS MIN & MAX LAST   Temp  Min: 97.9 °F (36.6 °C)  Max: 100.3 °F (37.9 °C) 98.6 °F (37 °C)   BP  Min: 106/65  Max: 133/89 119/71   Pulse  Min: 73  Max: 104  73   Resp  Min: 17  Max: 20 20   SpO2  Min: 94 %  Max: 99 % 95 %     I have reviewed the following labs:  Recent Labs   Lab 04/17/25 1045 04/17/25 2107 04/18/25 0411   WBC 7.11 6.59 6.21   RBC 3.87* 3.67* 3.79*   HGB 12.2* 11.5* 11.6*   HCT 38.0* 35.8* 36.5*   MCV 98.2* 97.5* 96.3*   MCH 31.5* 31.3* 30.6   MCHC 32.1* 32.1* 31.8*   RDW 16.0 16.0 16.0    183 189   MPV 10.8* 10.0 10.0     Recent Labs   Lab 04/17/25 1045 04/17/25 2013 04/17/25 2107 04/18/25 0411     --  139 138   K 5.1  --  4.2 3.7     --  105 103   CO2 23  --  26 25   BUN 21.9  --  18.9 19.3   CREATININE 1.35*  --  1.23 1.09   CALCIUM 9.1  --  8.9 9.1   PH  --  7.430  --   --    MG  --   --  1.94  --    ALBUMIN 4.2  --  3.7 3.8   ALKPHOS 109  --  100 103   ALT 16  --  11 12   AST 29  --  17 20   BILITOT 0.5  --  0.5 0.6     Microbiology Results (last 7 days)       Procedure Component Value Units Date/Time    Blood Culture [[id number]] Collected: 04/17/25 5566    Order Status: Resulted Specimen: Blood  Updated: 04/17/25 2205    Blood Culture [[id number]] Collected: 04/17/25 2119    Order Status: Resulted Specimen: Blood Updated: 04/17/25 2205             See below for Radiology    Assessment/Plan:  Acute metabolic encephalopathy: resolved   Mild vascular congestion: Stable   HTN, benign   KELSEY on CPAP at home   Alzheimer's/dementia   Generalized weakness     Plan:  Patient now at baseline mental status and in a good mood  He does have a high risk of delirium  Will place on delirium precautions  Hold vitals 7p-7a   Needs good sleep wake cycle     Lungs clear   F/U on ECHO  BNP 22    There is a no signs of sepsis    Continue strict aspiration, fall and decubitus precautions      Labs and vitals stable     Continue OT/PT    VTE prophylaxis: Lovenox     Patient condition:  Fair    Anticipated discharge and Disposition:   Home vs SNF      All diagnosis and differential diagnosis have been reviewed; assessment and plan has been documented; I have personally reviewed the labs and test results that are presently available; I have reviewed the patients medication list; I have reviewed the consulting providers response and recommendations. I have reviewed or attempted to review medical records based upon their availability    All of the patient's questions have been  addressed and answered. Patient's is agreeable to the above stated plan. I will continue to monitor closely and make adjustments to medical management as needed.    Portions of this note dictated using EMR integrated voice recognition software, and may be subject to voice recognition errors not corrected at proofreading. Please contact writer for clarification if needed.   _____________________________________________________________________    Malnutrition Status:  Nutrition consulted. Most recent weight and BMI monitored-     Measurements:  Wt Readings from Last 1 Encounters:   04/18/25 96.5 kg (212 lb 12.8 oz)   Body mass index is 33.33 kg/m².    Patient has  been screened and assessed by RD.    Malnutrition Type:  Context:    Level:      Malnutrition Characteristic Summary:       Interventions/Recommendations (treatment strategy):        Scheduled Med:   aspirin  81 mg Oral Daily    atorvastatin  40 mg Oral Daily    enoxparin  40 mg Subcutaneous Daily    ferrous sulfate  1 tablet Oral Daily    lisinopriL  5 mg Oral Daily    memantine  10 mg Oral BID    multivitamin  1 tablet Oral Daily    sertraline  75 mg Oral Daily      Continuous Infusions:     PRN Meds:    Current Facility-Administered Medications:     acetaminophen, 650 mg, Oral, Q4H PRN    albuterol-ipratropium, 3 mL, Nebulization, Q6H PRN    aluminum-magnesium hydroxide-simethicone, 30 mL, Oral, QID PRN    bisacodyL, 10 mg, Rectal, Daily PRN    dextrose 50%, 12.5 g, Intravenous, PRN    dextrose 50%, 25 g, Intravenous, PRN    glucagon (human recombinant), 1 mg, Intramuscular, PRN    glucose, 16 g, Oral, PRN    glucose, 24 g, Oral, PRN    melatonin, 9 mg, Oral, Nightly PRN    naloxone, 0.02 mg, Intravenous, PRN    ondansetron, 8 mg, Oral, Q8H PRN    ondansetron, 4 mg, Intravenous, Q8H PRN    polyethylene glycol, 17 g, Oral, TID PRN    simethicone, 1 tablet, Oral, QID PRN    sodium chloride 0.9%, 10 mL, Intravenous, PRN     Radiology:  I have personally reviewed the following imaging and agree with the radiologist.     CT Head Without Contrast  Narrative: EXAMINATION:  CT HEAD WITHOUT CONTRAST    CLINICAL HISTORY:  Mental status change, unknown cause;    TECHNIQUE:  Axial scans were obtained from skull base to the vertex.    Coronal and sagittal reconstructions obtained from the axial data.    Automatic exposure control was utilized to limit radiation dose.    Contrast: None    Radiation Dose:    Total DLP: 990 mGy*cm    COMPARISON:  CT head dated 12/08/2024    FINDINGS:  There is no acute intracranial hemorrhage or edema. The gray-white matter differentiation is preserved.  Patchy hypodensities subcortical and  periventricular white matter likely represent chronic microvascular ischemic changes.    There is no mass effect or midline shift.  There is diffuse parenchymal volume loss with stable size of the ventricles.  The basal cisterns are patent. There is no abnormal extra-axial fluid collection.    The calvarium and skull base are intact.  There is mild scattered paranasal sinus mucosal thickening.  Impression: 1. No acute intracranial abnormality.  2. Chronic microvascular ischemic changes.  No significant change from the [facility] interpretation.    Electronically signed by: Vi Leach  Date:    04/18/2025  Time:    08:30      Erick Correa MD  Department of Hospital Medicine   [facility]   04/18/2025

## 2025-04-18 NOTE — PLAN OF CARE
Problem: Occupational Therapy  Goal: Occupational Therapy Goal  Description: goals to be met by 5/18/25    Pt will complete grooming standing at sink with LRAD with CGA.  Pt will complete UB dressing with Liliane.  Pt will complete LB dressing with modA using LRAD and AE PRN.  Pt will complete functional mobility to/from toilet and toilet transfer with CGA using LRAD.   Outcome: Progressing

## 2025-04-18 NOTE — PLAN OF CARE
Problem: Physical Therapy  Goal: Physical Therapy Goal  Description: Goals to be met by: d/c     Patient will increase functional independence with mobility by performin. Supine to sit with Stand-by Assistance  2. Sit to supine with Stand-by Assistance  3. Sit to stand transfer with Stand-by Assistance  4. Bed to chair transfer with Stand-by Assistance using Rolling Walker  5. Gait  x 50 feet with Stand-by Assistance using Rolling Walker.     Outcome: Progressing

## 2025-04-18 NOTE — PT/OT/SLP EVAL
Physical Therapy Evaluation    Patient Name:  Rojas Foster   MRN:  [id number]    Recommendations:     Discharge therapy intensity: Moderate Intensity Therapy   Discharge Equipment Recommendations:  (TBD)   Barriers to discharge: Decreased caregiver support, Impaired mobility, and Ongoing medical needs    Assessment:     Rojas Foster is a 80 y.o. male admitted with a medical diagnosis of acute encephalopathy, acute respiratory failure, hypertension urgency, nonobstructive hydrocephalus, Alzheimer's, dementia.  He presents with the following impairments/functional limitations: weakness, gait instability, impaired endurance, impaired functional mobility, decreased lower extremity function. Pt tolerated session fair, delayed processing/command following present, decreased attention requiring redirection to task at hand. Pt with RLE weakness when compared to L; however, LUE weakness when compared to R. Pt with increased tone noted in legs with PF/inversion present when initiating movement of LE. Pt required MaxA x 2 for bed mobility and transfers. At baseline, pt able to ambulate with CGA/SBA using RW, pt would benefit from moderate intensity therapy at d/c.     Rehab Prognosis: Good; patient would benefit from acute skilled PT services to address these deficits and reach maximum level of function.    Recent Surgery: * No surgery found *      Plan:     During this hospitalization, patient would benefit from acute PT services 5 x/week to address the identified rehab impairments via gait training, therapeutic activities, therapeutic exercises and progress toward the following goals:    Plan of Care Expires:  05/17/25    Subjective     Chief Complaint: none verbalized  Patient/Family Comments/goals: to get stronger  Pain/Comfort:  Pain Rating 1: 0/10    Patients cultural, spiritual, Uatsdin conflicts given the current situation:      Living Environment:  Pt lives with spouse in a [facility] with flat entrance.   Prior to  admission, patients level of function was Amber with RW in the home, w/c for community level.  Equipment used at home: shower chair, walker, rolling, wheelchair.  DME owned (not currently used): none.  Upon discharge, patient will have assistance from spouse.    Objective:      Patient found HOB elevated with peripheral IV, PureWick, pulse ox (continuous), telemetry  upon PT entry to room.    General Precautions: Standard, fall  Orthopedic Precautions:N/A   Braces: N/A  Respiratory Status: Nasal cannula, flow 2 L/min    Exams:  Sensation: unable to accurately assess 2/2 poor command following/inattention  RLE Strength: 3 DF, 2- hip flexion, 2 knee extension  LLE Strength: 3+ DF, 3- hip flexion, 3 knee extension  Skin integrity:  redness noted to sacrum    Functional Mobility:  Bed Mobility:     Supine to Sit: maximal assistance and of 2 persons  Sit to Supine: maximal assistance and of 2 persons  Transfers:     Sit to Stand:  maximal assistance and of 2 persons with hand-held assist  Balance: poor static sitting balance      AM-PAC 6 CLICK MOBILITY  Total Score:11       Treatment & Education:    Patient provided with verbal education education regarding PT role/goals/POC, fall prevention, and safety awareness.  Understanding was verbalized, however additional teaching warranted.     Patient left with bed in chair position with all lines intact, call button in reach, bed alarm on, and spouse present.    GOALS:   Multidisciplinary Problems       Physical Therapy Goals          Problem: Physical Therapy    Goal Priority Disciplines Outcome Interventions   Physical Therapy Goal     PT, PT/OT Progressing    Description: Goals to be met by: d/c     Patient will increase functional independence with mobility by performin. Supine to sit with Stand-by Assistance  2. Sit to supine with Stand-by Assistance  3. Sit to stand transfer with Stand-by Assistance  4. Bed to chair transfer with Stand-by Assistance using Rolling  Walker  5. Gait  x 50 feet with Stand-by Assistance using Rolling Walker.                          History:     Past Medical History:   Diagnosis Date    Alzheimer's disease, unspecified (CODE)     Anxiety disorder, unspecified     Arthritis     Elevated PSA 01/28/2025    HTN (hypertension)        Past Surgical History:   Procedure Laterality Date    BACK SURGERY      SHOULDER SURGERY      SHOULDER SURGERY Left        Time Tracking:     PT Received On: 04/18/25  PT Start Time: 0759     PT Stop Time: 0813  PT Total Time (min): 14 min     Billable Minutes: Evaluation 14      04/18/2025

## 2025-04-18 NOTE — NURSING
Pt arrived to floor via stretcher in stable condition. Pt's spouse at bedside, oriented to room. UZMA Cleary made aware of pt's arrival to floor.

## 2025-04-18 NOTE — H&P
[facility] Hospital Medicine History & Physical Examination       Patient Name: Rojas Foster  MRN: [id number]  Patient Class: OP- Observation   Admission Date: 4/17/2025   Admitting Physician: WINSTON Service   Length of Stay: 0  Attending Physician: Dr Thairy Reyes  Primary Care Provider: Pedro Devlin MD  Face-to-Face encounter date: 04/18/2025  Code Status:Full code  Chief Complaint: Lethargic (Arrives via [facility] with reports of lethargy x 2 days. Reports increased lethargy this afternoon. )        Screening for Social Drivers for health:  Patient screened for food insecurity, housing instability, transportation needs, utility difficulties, and interpersonal safety (select all that apply as identified as concern)  []Housing or Food  []Transportation Needs  []Utility Difficulties  []Interpersonal safety  [x]None    Patient information was obtained from patient, patient's family, past medical records and/or ER records.     HISTORY OF PRESENT ILLNESS:   Rojas Foster is a 80 y.o. male who PMH includes Alzheimers dementia; anxiety, depression, chronic arthritis, HTN, HLD, TIA; presents to the ED at [facility] on 4/17/2025 with a primary complaint of per family reports of lethargy with worsening AMS and confusion. PT was seen by home health services today and was directed to come to the ED for further evaluation. Family reports pt did have cough, no reports of fever, vomiting, diarrhea or any known sick contacts.  Patient is a poor historian and can not provide any historical information at this time.  Wife is at the bedside providing majority of the history.  Lab work reviewed demonstrated H&H 11.5/35.8, other indices unremarkable.  Chest x-ray impression reviewed demonstrated cardiomegaly with central vascular congestion, no evidence of acute intrathoracic process.  CT of head without contrast impression reviewed demonstrated no acute intracranial process identified; the ventricles, sulci  and basal cisterns demonstrates stable mild prominence consistent with global cerebral atrophy, ventricles appear dilated out of proportion to the sulci suggestion an element of stable appearing nonobstructive hydrocephalus.  Initial vital signs /90 pulse 92 respirations 18 temperature 99.3° F O2 saturation 97% on 2 L per nasal cannula.  Patient did have slight drop in saturations which he was placed on CPAP therapy with improvement in oxygenation and respiratory status.  Patient was still semi lethargic at the time of examination and rounds. Wife reports he has not woken up much since she found his altered prior to arrival in the ED. Pt is admitted to hospital medicine services for further management.     PAST MEDICAL HISTORY:     Past Medical History:   Diagnosis Date    Alzheimer's disease, unspecified (CODE)     Anxiety disorder, unspecified     Arthritis     Elevated PSA 01/28/2025    HTN (hypertension)        PAST SURGICAL HISTORY:     Past Surgical History:   Procedure Laterality Date    BACK SURGERY      SHOULDER SURGERY      SHOULDER SURGERY Left        ALLERGIES:   Codeine    FAMILY HISTORY:   Reviewed and negative    SOCIAL HISTORY:     Social History     Tobacco Use    Smoking status: Never    Smokeless tobacco: Current     Types: Chew   Substance Use Topics    Alcohol use: Not Currently        HOME MEDICATIONS:   As documented  Prior to Admission medications    Medication Sig Start Date End Date Taking? Authorizing Provider   alendronate (FOSAMAX) 35 MG tablet Take 35 mg by mouth once a week.   Yes Provider, Historical   aspirin (ECOTRIN) 81 MG EC tablet Take 1 tablet (81 mg total) by mouth once daily. 7/24/24  Yes Faye Herron FNP   atorvastatin (LIPITOR) 40 MG tablet Take 1 tablet (40 mg total) by mouth once daily. 2/13/25  Yes Pedro Devlin MD   ferrous sulfate 325 (65 FE) MG EC tablet Take 1 tablet (325 mg total) by mouth once daily. 7/24/24  Yes Faye Herron FNP   lisinopriL  (PRINIVIL,ZESTRIL) 5 MG tablet Take 1 tablet (5 mg total) by mouth once daily. 7/24/24 7/24/25 Yes Faye Herron FNP   memantine (NAMENDA) 10 MG Tab Take 1 tablet (10 mg total) by mouth 2 (two) times daily. 1/6/25 4/1/26 Yes Luna Mahmood NP   polyethylene glycol (GLYCOLAX) 17 gram PwPk Take 17 g by mouth once daily. 7/24/24  Yes Faye Herron FNP   pregabalin (LYRICA) 100 MG capsule Take 1 capsule by mouth 3 (three) times daily.   Yes Provider, Historical   sertraline (ZOLOFT) 25 MG tablet Take 3 tablets (75 mg total) by mouth once daily. 1/6/25 4/1/26 Yes Luna Mahmood NP   traMADoL (ULTRAM) 50 mg tablet Take 1 tablet (50 mg total) by mouth every 6 (six) hours as needed for Pain. 12/12/24  Yes Aldo Dahl MD   hydrOXYzine pamoate (VISTARIL) 50 MG Cap Take 1 capsule (50 mg total) by mouth nightly as needed (insomnia). 7/24/24   Faye Herron FNP   multivitamin Tab Take 1 tablet by mouth once daily. 7/24/24   Faye Herron FNP   triamterene-hydrochlorothiazide 37.5-25 mg (DYAZIDE) 37.5-25 mg per capsule Take 1 capsule by mouth every morning.  8/23/22  Provider, Historical       REVIEW OF SYSTEMS:   Unable to obtain secondary to clinical condition    PHYSICAL EXAM:     VITAL SIGNS: 24 HRS MIN & MAX LAST   Temp  Min: 99.3 °F (37.4 °C)  Max: 99.3 °F (37.4 °C) 99.3 °F (37.4 °C)   BP  Min: 108/79  Max: 133/89 128/89   Pulse  Min: 80  Max: 104  90   Resp  Min: 17  Max: 19 17   SpO2  Min: 95 %  Max: 99 % 95 %       General appearance: Well-developed, well-nourished male sleeping wakes briefly for examination and falls back to sleep, fatigued nontoxic appearing in no apparent distress, currently on CPAP; wife is at the bedside.  HENT: Atraumatic head.  Dry mucous membranes of oral cavity.  Eyes: PERRL  Lungs: diminished  bilaterally, scattered crackles, mildly labored respirations; symmetrical expansion, currently on CPAP therapy  Heart: Regular rate and rhythm. S1 and S2 present systolic  murmur, with no murmurs/gallop/rub. No pedal edema. No JVD present.   Abdomen: Soft, non-distended, Bowel sounds are normal.   Extremities: No cyanosis, clubbing generalized weakness.  Skin:  Warm and dry   Neuro:  Drowsy semi lethargic wakes briefly for examination and falls back to sleep, neuro exam limited secondary to clinical condition  Psych/mental status:  Flat affect, cooperative    LABS AND IMAGING:     Recent Labs   Lab 04/17/25 1045 04/17/25 2107   WBC 7.11 6.59   RBC 3.87* 3.67*   HGB 12.2* 11.5*   HCT 38.0* 35.8*   MCV 98.2* 97.5*   MCH 31.5* 31.3*   MCHC 32.1* 32.1*   RDW 16.0 16.0    183   MPV 10.8* 10.0       Recent Labs   Lab 04/17/25 1045 04/17/25 2013 04/17/25 2107     --  139   K 5.1  --  4.2     --  105   CO2 23  --  26   BUN 21.9  --  18.9   CREATININE 1.35*  --  1.23   CALCIUM 9.1  --  8.9   PH  --  7.430  --    MG  --   --  1.94   ALBUMIN 4.2  --  3.7   ALKPHOS 109  --  100   ALT 16  --  11   AST 29  --  17   BILITOT 0.5  --  0.5       Microbiology Results (last 7 days)       Procedure Component Value Units Date/Time    Blood Culture [[id number]] Collected: 04/17/25 2107    Order Status: Resulted Specimen: Blood Updated: 04/17/25 2205    Blood Culture [[id number]] Collected: 04/17/25 2119    Order Status: Resulted Specimen: Blood Updated: 04/17/25 2205             CT Head Without Contrast  START OF REPORT:  Technique: CT of the head was performed without intravenous contrast with axial as well as coronal and sagittal images.    Comparison: Comparison is with study dated 2024-12-08 15:57:38.    Dosage Information: Automated Exposure Control was utilized 989.93 mGy.cm.    Clinical history: AMS, unknown cause.    Findings:  Hemorrhage: No acute intracranial hemorrhage is seen.  CSF spaces: The ventricles, sulci and basal cisterns demonstrates stable mild prominence consistent with global cerebral atrophy. The ventricles appear dilated out of proportion to the sulci  suggesting an element of stable appearing non-obstructive hydrocephalus.  Brain parenchyma: There is preservation of the grey white junction throughout. No acute infarct. Pronounced stable appearing microvascular change is seen in portions of the periventricular and deep white matter tracts. Follow-up as clinically indicated.  Cerebellum: Unremarkable.  Vascular: Unremarkable venous sinuses. Mild atheromatous calcification of the intracranial arteries is seen.  Sella and skull base: The sella appears to be within normal limits for age.  Cerebellopontine angles: Within normal limits.  Herniation: None.  Intracranial calcifications: Incidental note is made of bilateral choroid plexus calcification. Incidental note is made of some pineal region calcification.  Calvarium: No acute linear or depressed skull fracture is seen.    Maxillofacial Structures:  Paranasal sinuses: The visualized paranasal sinuses appear clear with no significant mucoperiosteal thickening or air fluid levels identified.  Orbits: The orbits appear unremarkable.  Zygomatic arches: The zygomatic arches are intact and unremarkable.  Temporal bones and mastoids: The temporal bones and mastoids appear unremarkable.  TMJ: The mandibular condyles appear normally placed with respect to the mandibular fossa.    Impression:  1. No acute intracranial process identified. Details and other findings as noted above.  X-Ray Chest 1 View  EXAMINATION  XR CHEST 1 VIEW    CLINICAL HISTORY  Altered mental status, unspecified    TECHNIQUE  A total of 1 frontal image(s) of the chest.    COMPARISON  8 December 2024    FINDINGS  Lines/tubes/devices: ECG leads overlie the imaged region.    There is similar enlargement of the cardiac silhouette and associated central vascular congestion.  The trachea is midline. There is no lobar consolidation, pleural effusion, or pneumothorax.  Left hemidiaphragm elevation is unchanged.    Degenerative change of the regional osseous  structures and left glenohumeral arthroplasty components are similar.    IMPRESSION  1. Similar cardiomegaly and central vascular congestion.  2. No evidence of acute intrathoracic process.  3. Additional chronic findings discussed above, similar in the interval.    Electronically signed by: Aman James  Date:    04/17/2025  Time:    20:22        ASSESSMENT & PLAN:   ASSESSMENT:  Acute encephalopathy-suspect metabolic versus exacerbation and chronic disease-POA   Pulmonary vascular congestion per chest x-ray-POA   Acute respiratory failure requiring CPAP- initiated in the ED 4/18/2025  HTN- urgency- POA  Nonobstructive hydrocephalus per CT scan imaging-POA   Alzheimer's and dementia-chronic-POA    Hx of anxiety, depression, chronic arthritis, HLD, TIA, osteoporosis      PLAN:  Fall precautions   Neurochecks q.4 hours times 24 hours   Hold all sedating medications   Echocardiogram   Resume home medication as deemed necessary   PT OT eval and treat   Repeat lab work in a.m.  Wean CPAP therapy as tolerated      VTE Prophylaxis:  Lovenox for DVT prophylaxis and will be advised to be as mobile as possible and sit in a chair as tolerated    Patient condition:  Stable    __________________________________________________________________________  INPATIENT LIST OF MEDICATIONS     Scheduled Meds:   aspirin  81 mg Oral Daily    enoxparin  40 mg Subcutaneous Daily    ferrous sulfate  1 tablet Oral Daily    lisinopriL  5 mg Oral Daily    memantine  10 mg Oral BID    multivitamin  1 tablet Oral Daily    sertraline  75 mg Oral Daily     Continuous Infusions:  PRN Meds:.  Current Facility-Administered Medications:     acetaminophen, 1,000 mg, Oral, Q6H PRN    acetaminophen, 650 mg, Oral, Q4H PRN    dextrose 50%, 12.5 g, Intravenous, PRN    dextrose 50%, 25 g, Intravenous, PRN    glucagon (human recombinant), 1 mg, Intramuscular, PRN    glucose, 16 g, Oral, PRN    glucose, 24 g, Oral, PRN    naloxone, 0.02 mg, Intravenous, PRN     ondansetron, 4 mg, Intravenous, Q4H PRN    prochlorperazine, 5 mg, Intravenous, Q6H PRN    sodium chloride 0.9%, 10 mL, Intravenous, PRN      IJules FNP have reviewed and discussed the case with   Dr. Thairy Reyes. Please see the following addendum for further assessment and plan from their attending MD. This note was created with a assistance of electronic voice recognition software.  There may be transcription errors as a result of using this technology however minimal; and effort has been made to assure accuracy of the transcription but any obvious areas or admissions should be clarified with the author of the document   ILIANA Ortiz   04/18/2025    ________________________________________________________________________________    MD Addendum:  Dr. PARK ---assumed care of this patient today at ---am/pm  For the patient encounter, I performed the substantive portion of the visit, I reviewed the NP/PA documentation, treatment plan, and medical decision making.  I had face to face time with this patient     A. History:    B. Physical exam:    C. Medical decision making:    Discharge Planning and Disposition: No mobility needs. Ambulating well. Good social support system.   Anticipated discharge    All diagnosis and differential diagnosis have been reviewed; assessment and plan has been documented; I have personally reviewed the labs and test results that are presently available; I have reviewed the patients medication list; I have reviewed the consulting providers response and recommendations. I have reviewed or attempted to review medical records based upon their availability.    All of the patient and family questions have been addressed and answered. Patient's is agreeable to the above stated plan. I will continue to monitor closely and make adjustments to medical management as needed.

## 2025-04-19 LAB
APICAL FOUR CHAMBER EJECTION FRACTION: 61 %
APICAL TWO CHAMBER EJECTION FRACTION: 68 %
AV INDEX (PROSTH): 0.65
AV MEAN GRADIENT: 4 MMHG
AV PEAK GRADIENT: 6 MMHG
AV VALVE AREA BY VELOCITY RATIO: 2.1 CM²
AV VALVE AREA: 2 CM²
AV VELOCITY RATIO: 0.67
BACTERIA UR CULT: NORMAL
BSA FOR ECHO PROCEDURE: 2.14 M2
DOP CALC AO PEAK VEL: 1.2 M/S
DOP CALC AO VTI: 25.4 CM
DOP CALC LVOT AREA: 3.1 CM2
DOP CALC LVOT DIAMETER: 2 CM
DOP CALC LVOT PEAK VEL: 0.8 M/S
DOP CALC LVOT STROKE VOLUME: 51.5 CM3
DOP CALC MV VTI: 22.6 CM
DOP CALCLVOT PEAK VEL VTI: 16.4 CM
E WAVE DECELERATION TIME: 301 MSEC
E/A RATIO: 0.63
E/E' RATIO: 11 M/S
HR MV ECHO: 63 BPM
LEFT ATRIUM SIZE: 2.5 CM
LEFT VENTRICLE DIASTOLIC VOLUME INDEX: 43.75 ML/M2
LEFT VENTRICLE DIASTOLIC VOLUME: 91 ML
LEFT VENTRICLE END DIASTOLIC VOLUME APICAL 2 CHAMBER: 70.4 ML
LEFT VENTRICLE END DIASTOLIC VOLUME APICAL 4 CHAMBER: 112 ML
LEFT VENTRICLE SYSTOLIC VOLUME INDEX: 15.9 ML/M2
LEFT VENTRICLE SYSTOLIC VOLUME: 33 ML
LV LATERAL E/E' RATIO: 10 M/S
LV SEPTAL E/E' RATIO: 12.5 M/S
LVOT MG: 1 MMHG
LVOT MV: 0.51 CM/S
MV MEAN GRADIENT: 1 MMHG
MV PEAK A VEL: 0.8 M/S
MV PEAK E VEL: 0.5 M/S
MV PEAK GRADIENT: 3 MMHG
MV STENOSIS PRESSURE HALF TIME: 100 MS
MV VALVE AREA BY CONTINUITY EQUATION: 2.28 CM2
MV VALVE AREA P 1/2 METHOD: 2.2 CM2
OHS LV EJECTION FRACTION SIMPSONS BIPLANE MOD: 64 %
RA PRESSURE ESTIMATED: 3 MMHG
SINUS: 4 CM
TDI LATERAL: 0.05 M/S
TDI SEPTAL: 0.04 M/S
TDI: 0.05 M/S

## 2025-04-19 PROCEDURE — 27100171 HC OXYGEN HIGH FLOW UP TO 24 HOURS

## 2025-04-19 PROCEDURE — 25000003 PHARM REV CODE 250: Performed by: NURSE PRACTITIONER

## 2025-04-19 PROCEDURE — 99900035 HC TECH TIME PER 15 MIN (STAT)

## 2025-04-19 PROCEDURE — 63600175 PHARM REV CODE 636 W HCPCS: Performed by: STUDENT IN AN ORGANIZED HEALTH CARE EDUCATION/TRAINING PROGRAM

## 2025-04-19 PROCEDURE — 94760 N-INVAS EAR/PLS OXIMETRY 1: CPT

## 2025-04-19 PROCEDURE — 51798 US URINE CAPACITY MEASURE: CPT

## 2025-04-19 PROCEDURE — G0378 HOSPITAL OBSERVATION PER HR: HCPCS

## 2025-04-19 PROCEDURE — 96372 THER/PROPH/DIAG INJ SC/IM: CPT | Performed by: STUDENT IN AN ORGANIZED HEALTH CARE EDUCATION/TRAINING PROGRAM

## 2025-04-19 PROCEDURE — 25000003 PHARM REV CODE 250: Performed by: STUDENT IN AN ORGANIZED HEALTH CARE EDUCATION/TRAINING PROGRAM

## 2025-04-19 PROCEDURE — 25000003 PHARM REV CODE 250: Performed by: INTERNAL MEDICINE

## 2025-04-19 PROCEDURE — 94660 CPAP INITIATION&MGMT: CPT

## 2025-04-19 RX ADMIN — ASPIRIN 81 MG: 81 TABLET, COATED ORAL at 10:04

## 2025-04-19 RX ADMIN — MEMANTINE 10 MG: 5 TABLET ORAL at 09:04

## 2025-04-19 RX ADMIN — FERROUS SULFATE TAB 325 MG (65 MG ELEMENTAL FE) 1 EACH: 325 (65 FE) TAB at 10:04

## 2025-04-19 RX ADMIN — SERTRALINE HYDROCHLORIDE 75 MG: 50 TABLET ORAL at 10:04

## 2025-04-19 RX ADMIN — MEMANTINE 10 MG: 5 TABLET ORAL at 10:04

## 2025-04-19 RX ADMIN — LISINOPRIL 5 MG: 5 TABLET ORAL at 10:04

## 2025-04-19 RX ADMIN — MULTIVITAMIN TABLET 1 TABLET: TABLET at 10:04

## 2025-04-19 RX ADMIN — TAMSULOSIN HYDROCHLORIDE 0.4 MG: 0.4 CAPSULE ORAL at 10:04

## 2025-04-19 RX ADMIN — ENOXAPARIN SODIUM 40 MG: 40 INJECTION SUBCUTANEOUS at 04:04

## 2025-04-19 RX ADMIN — ATORVASTATIN CALCIUM 40 MG: 40 TABLET, FILM COATED ORAL at 10:04

## 2025-04-19 NOTE — PLAN OF CARE
Problem: Adult Inpatient Plan of Care  Goal: Plan of Care Review  Outcome: Not Progressing  Goal: Patient-Specific Goal (Individualized)  Outcome: Not Progressing  Goal: Absence of Hospital-Acquired Illness or Injury  Outcome: Not Progressing  Goal: Optimal Comfort and Wellbeing  Outcome: Not Progressing  Goal: Readiness for Transition of Care  Outcome: Not Progressing     Problem: Infection  Goal: Absence of Infection Signs and Symptoms  Outcome: Not Progressing     Problem: Fall Injury Risk  Goal: Absence of Fall and Fall-Related Injury  Outcome: Not Progressing

## 2025-04-19 NOTE — PT/OT/SLP PROGRESS
[facility]  Speech Language Pathology Department  Diet Tolerance Follow-up    Patient Name:  Rojas Foster   MRN:  [id number]    Recommendations     General recommendations:  SLP intervention not indicated  Solid texture recommendation:  Soft & Bite Sized Diet - IDDSI Level 6  Liquid consistency recommendation: Thin liquids - IDDSI Level 0   Medications: crushed in puree  Swallow strategies/precautions: small bites/sips, slow rate, upright for PO intake, and assist with feeding as needed  Precautions: fall    Diet Tolerance     Nursing reports no difficulty regarding diet tolerance.    Outcome Measures     Functional Oral Intake Scale: 5 - Total oral diet with multiple consistencies, by requiring special preparation or compensations    Patient Education     No learner present/available.          04/19/2025

## 2025-04-19 NOTE — PROGRESS NOTES
[facility]  Hospital Medicine Progress Note        Chief Complaint: Inpatient Follow-up for weakness     HPI:     Rojas Foster is a 80 y.o. male who PMH includes Alzheimers dementia; anxiety, depression, chronic arthritis, HTN, HLD, TIA; presents to the ED at [facility] on 4/17/2025 with a primary complaint of per family reports of lethargy with worsening AMS and confusion. PT was seen by home health services today and was directed to come to the ED for further evaluation. Family reports pt did have cough, no reports of fever, vomiting, diarrhea or any known sick contacts.  Patient is a poor historian and can not provide any historical information at this time.  Wife is at the bedside providing majority of the history.  Lab work reviewed demonstrated H&H 11.5/35.8, other indices unremarkable.  Chest x-ray impression reviewed demonstrated cardiomegaly with central vascular congestion, no evidence of acute intrathoracic process.  CT of head without contrast impression reviewed demonstrated no acute intracranial process identified; the ventricles, sulci and basal cisterns demonstrates stable mild prominence consistent with global cerebral atrophy, ventricles appear dilated out of proportion to the sulci suggestion an element of stable appearing nonobstructive hydrocephalus.  Initial vital signs /90 pulse 92 respirations 18 temperature 99.3° F O2 saturation 97% on 2 L per nasal cannula.  Patient did have slight drop in saturations which he was placed on CPAP therapy with improvement in oxygenation and respiratory status.  Patient was still semi lethargic at the time of examination and rounds. Wife reports he has not woken up much since she found his altered prior to arrival in the ED. Pt is admitted to hospital medicine services for further management.     Interval Hx:   Patient today awake and comfortable. In a good mood. Oriented to self and place. Following all verbal commands. He  is very weak and needing total assist to get out of bed.     Family at bedside, explained in detail about the patients condition, diagnosis, vitals, labs and treatment plan. They understand and agree with the plan. All their questions were answered.      Case was discussed with patient's nurse and  on the floor.    Objective/physical exam:  General: In no acute distress, in a good mood.   Chest: Clear to auscultation bilaterally  Heart: RRR, +S1, S2, no appreciable murmur  Abdomen: Soft, nontender, BS +  Neurologic: Cranial nerve II-XII intact, Strength 5/5 tiny UE, 3/5 tiny LE     VITAL SIGNS: 24 HRS MIN & MAX LAST   Temp  Min: 97.6 °F (36.4 °C)  Max: 98.6 °F (37 °C) 98.3 °F (36.8 °C)   BP  Min: 115/65  Max: 132/83 117/75   Pulse  Min: 57  Max: 83  71   Resp  Min: 14  Max: 20 14   SpO2  Min: 95 %  Max: 99 % 98 %     I have reviewed the following labs:  Recent Labs   Lab 04/17/25 1045 04/17/25 2107 04/18/25 0411   WBC 7.11 6.59 6.21   RBC 3.87* 3.67* 3.79*   HGB 12.2* 11.5* 11.6*   HCT 38.0* 35.8* 36.5*   MCV 98.2* 97.5* 96.3*   MCH 31.5* 31.3* 30.6   MCHC 32.1* 32.1* 31.8*   RDW 16.0 16.0 16.0    183 189   MPV 10.8* 10.0 10.0     Recent Labs   Lab 04/17/25 1045 04/17/25 2013 04/17/25 2107 04/18/25  0411     --  139 138   K 5.1  --  4.2 3.7     --  105 103   CO2 23  --  26 25   BUN 21.9  --  18.9 19.3   CREATININE 1.35*  --  1.23 1.09   CALCIUM 9.1  --  8.9 9.1   PH  --  7.430  --   --    MG  --   --  1.94  --    ALBUMIN 4.2  --  3.7 3.8   ALKPHOS 109  --  100 103   ALT 16  --  11 12   AST 29  --  17 20   BILITOT 0.5  --  0.5 0.6     Microbiology Results (last 7 days)       Procedure Component Value Units Date/Time    Blood Culture [[id number]]  (Normal) Collected: 04/17/25 2107    Order Status: Completed Specimen: Blood Updated: 04/18/25 2300     Blood Culture No Growth At 24 Hours    Blood Culture [[id number]]  (Normal) Collected: 04/17/25 2119    Order Status: Completed  Specimen: Blood Updated: 04/18/25 2300     Blood Culture No Growth At 24 Hours             See below for Radiology    Assessment/Plan:  Acute metabolic encephalopathy: resolved   Mild vascular congestion: Stable   HTN, benign   KELSEY on CPAP at home   Alzheimer's/dementia   Generalized weakness     Plan:  Patients mental status is good.   He is very weak and needing full assist to get out of bed.   Looks like this is chronic. Will continue OT/PT  Do not think his wife will be able to take care of him at home     He does have a high risk of delirium  Will place on delirium precautions  Hold vitals 7p-7a   Needs good sleep wake cycle     Continue strict aspiration, fall and decubitus precautions      Labs and vitals stable       VTE prophylaxis: Lovenox     Patient condition:  Fair    Anticipated discharge and Disposition:   SNF      All diagnosis and differential diagnosis have been reviewed; assessment and plan has been documented; I have personally reviewed the labs and test results that are presently available; I have reviewed the patients medication list; I have reviewed the consulting providers response and recommendations. I have reviewed or attempted to review medical records based upon their availability    All of the patient's questions have been  addressed and answered. Patient's is agreeable to the above stated plan. I will continue to monitor closely and make adjustments to medical management as needed.    Portions of this note dictated using EMR integrated voice recognition software, and may be subject to voice recognition errors not corrected at proofreading. Please contact writer for clarification if needed.   _____________________________________________________________________    Malnutrition Status:  Nutrition consulted. Most recent weight and BMI monitored-     Measurements:  Wt Readings from Last 1 Encounters:   04/18/25 96.5 kg (212 lb 12.8 oz)   Body mass index is 33.33 kg/m².    Patient has been  screened and assessed by RD.    Malnutrition Type:  Context:    Level:      Malnutrition Characteristic Summary:       Interventions/Recommendations (treatment strategy):        Scheduled Med:   aspirin  81 mg Oral Daily    atorvastatin  40 mg Oral Daily    enoxparin  40 mg Subcutaneous Daily    ferrous sulfate  1 tablet Oral Daily    lisinopriL  5 mg Oral Daily    memantine  10 mg Oral BID    multivitamin  1 tablet Oral Daily    sertraline  75 mg Oral Daily    tamsulosin  0.4 mg Oral Daily      Continuous Infusions:     PRN Meds:    Current Facility-Administered Medications:     acetaminophen, 650 mg, Oral, Q4H PRN    albuterol-ipratropium, 3 mL, Nebulization, Q6H PRN    aluminum-magnesium hydroxide-simethicone, 30 mL, Oral, QID PRN    bisacodyL, 10 mg, Rectal, Daily PRN    dextrose 50%, 12.5 g, Intravenous, PRN    dextrose 50%, 25 g, Intravenous, PRN    glucagon (human recombinant), 1 mg, Intramuscular, PRN    glucose, 16 g, Oral, PRN    glucose, 24 g, Oral, PRN    melatonin, 9 mg, Oral, Nightly PRN    naloxone, 0.02 mg, Intravenous, PRN    ondansetron, 8 mg, Oral, Q8H PRN    ondansetron, 4 mg, Intravenous, Q8H PRN    polyethylene glycol, 17 g, Oral, TID PRN    simethicone, 1 tablet, Oral, QID PRN    sodium chloride 0.9%, 10 mL, Intravenous, PRN     Radiology:  I have personally reviewed the following imaging and agree with the radiologist.     Echo    Overall the study quality was technically difficult.    Left Ventricle: Left ventricle was not well visualized due to poor   sonic window. There is normal systolic function with a visually estimated   ejection fraction of 60 - 65%. There is diastolic dysfunction.    Aortic Valve: There is trace aortic regurgitation.    IVC/SVC: Normal venous pressure at 3 mmHg.    Pericardium: There is no pericardial effusion.      Erick Correa MD  Department of Hospital Medicine   [facility]   04/19/2025

## 2025-04-20 PROCEDURE — 25000003 PHARM REV CODE 250: Performed by: STUDENT IN AN ORGANIZED HEALTH CARE EDUCATION/TRAINING PROGRAM

## 2025-04-20 PROCEDURE — 27100171 HC OXYGEN HIGH FLOW UP TO 24 HOURS

## 2025-04-20 PROCEDURE — 25000003 PHARM REV CODE 250: Performed by: INTERNAL MEDICINE

## 2025-04-20 PROCEDURE — 63600175 PHARM REV CODE 636 W HCPCS: Performed by: STUDENT IN AN ORGANIZED HEALTH CARE EDUCATION/TRAINING PROGRAM

## 2025-04-20 PROCEDURE — 97530 THERAPEUTIC ACTIVITIES: CPT | Mod: CQ

## 2025-04-20 PROCEDURE — G0378 HOSPITAL OBSERVATION PER HR: HCPCS

## 2025-04-20 PROCEDURE — 25000003 PHARM REV CODE 250: Performed by: NURSE PRACTITIONER

## 2025-04-20 PROCEDURE — 96372 THER/PROPH/DIAG INJ SC/IM: CPT | Performed by: STUDENT IN AN ORGANIZED HEALTH CARE EDUCATION/TRAINING PROGRAM

## 2025-04-20 PROCEDURE — 99900035 HC TECH TIME PER 15 MIN (STAT)

## 2025-04-20 RX ORDER — POLYETHYLENE GLYCOL 3350 17 G/17G
17 POWDER, FOR SOLUTION ORAL DAILY
Status: DISCONTINUED | OUTPATIENT
Start: 2025-04-20 | End: 2025-04-22 | Stop reason: HOSPADM

## 2025-04-20 RX ADMIN — ENOXAPARIN SODIUM 40 MG: 40 INJECTION SUBCUTANEOUS at 05:04

## 2025-04-20 RX ADMIN — ATORVASTATIN CALCIUM 40 MG: 40 TABLET, FILM COATED ORAL at 09:04

## 2025-04-20 RX ADMIN — MEMANTINE 10 MG: 5 TABLET ORAL at 09:04

## 2025-04-20 RX ADMIN — POLYETHYLENE GLYCOL 3350 17 G: 17 POWDER, FOR SOLUTION ORAL at 12:04

## 2025-04-20 RX ADMIN — MULTIVITAMIN TABLET 1 TABLET: TABLET at 09:04

## 2025-04-20 RX ADMIN — ASPIRIN 81 MG: 81 TABLET, COATED ORAL at 09:04

## 2025-04-20 RX ADMIN — LISINOPRIL 5 MG: 5 TABLET ORAL at 09:04

## 2025-04-20 RX ADMIN — SERTRALINE HYDROCHLORIDE 75 MG: 50 TABLET ORAL at 09:04

## 2025-04-20 RX ADMIN — FERROUS SULFATE TAB 325 MG (65 MG ELEMENTAL FE) 1 EACH: 325 (65 FE) TAB at 09:04

## 2025-04-20 RX ADMIN — TAMSULOSIN HYDROCHLORIDE 0.4 MG: 0.4 CAPSULE ORAL at 09:04

## 2025-04-20 NOTE — PT/OT/SLP PROGRESS
Physical Therapy Treatment    Patient Name:  Rojas Foster   MRN:  [id number]    Recommendations:     Discharge therapy intensity: Moderate Intensity Therapy   Discharge Equipment Recommendations:  (TBD)  Barriers to discharge: Impaired mobility and Ongoing medical needs    Assessment:     Rojas Foster is a 80 y.o. male.  He presents with the following impairments/functional limitations: weakness, gait instability, impaired endurance, impaired functional mobility, decreased lower extremity function.  Communicated with NSG prior to session.      Rehab Prognosis: Fair; patient would benefit from acute skilled PT services to address these deficits and reach maximum level of function.    Recent Surgery: * No surgery found *    General Precautions: Standard, fall  Orthopedic Precautions: N/A  Braces: N/A  Respiratory Status: Room air  Skin Integrity: Visible skin intact    Plan:     During this hospitalization, patient would benefit from acute PT services 5 x/week to address the identified rehab impairments via gait training, therapeutic activities, therapeutic exercises and progress toward the following goals:    Plan of Care Expires:  05/17/25    Subjective     Patient found supine upon PT entry to room. Greeted pt and explained the treatment planned for today's session. Pt agreed to session.    Objective:     Functional Mobility:    Bed Mobility: Max A rolling L/R to change soiled brief. Supine to EOB Max A    Transfers: Sit to Stands Max A.   Pt performed squat pivot transfer with Max A.    Gait Training: N/A    Balance: CGA to maintain EOB sitting.        Education:  Role and goals of PT, transfer training, bed mobility, gait training, balance training, safety awareness, assistive device, strengthening exercises, and importance of participating in PT to return to PLOF     Patient left up in chair with all lines intact, call button in reach, and wife present    GOALS:   Multidisciplinary Problems       Physical  Therapy Goals          Problem: Physical Therapy    Goal Priority Disciplines Outcome Interventions   Physical Therapy Goal     PT, PT/OT Progressing    Description: Goals to be met by: d/c     Patient will increase functional independence with mobility by performin. Supine to sit with Stand-by Assistance  2. Sit to supine with Stand-by Assistance  3. Sit to stand transfer with Stand-by Assistance  4. Bed to chair transfer with Stand-by Assistance using Rolling Walker  5. Gait  x 50 feet with Stand-by Assistance using Rolling Walker.                          Time Tracking:     Billable Minutes: Therapeutic Activity 24    Treatment Type: Treatment  PT/PTA: PTA     Number of PTA visits since last PT visit: 2025

## 2025-04-20 NOTE — PROGRESS NOTES
[facility] Hospital Medicine Progress Note        Chief Complaint: Inpatient Follow-up     HPI:   Rojas Foster is a 80 y.o. male who PMH includes Alzheimers dementia; anxiety, depression, chronic arthritis, HTN, HLD, TIA; presents to the ED at [facility] on 4/17/2025 with a primary complaint of per family reports of lethargy with worsening AMS and confusion. PT was seen by home health services today and was directed to come to the ED for further evaluation. Family reports pt did have cough, no reports of fever, vomiting, diarrhea or any known sick contacts.  Patient is a poor historian and can not provide any historical information at this time.  Wife is at the bedside providing majority of the history.  Lab work reviewed demonstrated H&H 11.5/35.8, other indices unremarkable.  Chest x-ray impression reviewed demonstrated cardiomegaly with central vascular congestion, no evidence of acute intrathoracic process.  CT of head without contrast impression reviewed demonstrated no acute intracranial process identified; the ventricles, sulci and basal cisterns demonstrates stable mild prominence consistent with global cerebral atrophy, ventricles appear dilated out of proportion to the sulci suggestion an element of stable appearing nonobstructive hydrocephalus.  Initial vital signs /90 pulse 92 respirations 18 temperature 99.3° F O2 saturation 97% on 2 L per nasal cannula.  Patient did have slight drop in saturations which he was placed on CPAP therapy with improvement in oxygenation and respiratory status.  Patient was still semi lethargic at the time of examination and rounds. Wife reports he has not woken up much since she found his altered prior to arrival in the ED. Pt is admitted to hospital medicine services for further management.     4/19 Patient today awake and comfortable. In a good mood. Oriented to self and place. Following all verbal commands. He is very weak and needing  total assist to get out of bed.     Interval Hx:     AF. NAEON. Wife at bedside. Denies pain.    Case was discussed with patient's nurse and  on the floor.    Objective/physical exam:  General: In no acute distress, afebrile  Chest: Clear to auscultation bilaterally  Heart: RRR, +S1, S2, no appreciable murmur  Abdomen: Soft, nontender, BS +  MSK: Warm, no lower extremity edema, no clubbing or cyanosis  Neurologic: Alert and oriented, moving all extremities with good strength    VITAL SIGNS: 24 HRS MIN & MAX LAST   Temp  Min: 97.6 °F (36.4 °C)  Max: 98.3 °F (36.8 °C) 98.1 °F (36.7 °C)   BP  Min: 98/59  Max: 117/75 (!) 98/59   Pulse  Min: 71  Max: 90  86   Resp  Min: 14  Max: 14 14   SpO2  Min: 92 %  Max: 98 % 95 %     I have reviewed the following labs:  Recent Labs   Lab 04/17/25 1045 04/17/25 2107 04/18/25 0411   WBC 7.11 6.59 6.21   RBC 3.87* 3.67* 3.79*   HGB 12.2* 11.5* 11.6*   HCT 38.0* 35.8* 36.5*   MCV 98.2* 97.5* 96.3*   MCH 31.5* 31.3* 30.6   MCHC 32.1* 32.1* 31.8*   RDW 16.0 16.0 16.0    183 189   MPV 10.8* 10.0 10.0     Recent Labs   Lab 04/17/25 1045 04/17/25 2013 04/17/25 2107 04/18/25 0411     --  139 138   K 5.1  --  4.2 3.7     --  105 103   CO2 23  --  26 25   BUN 21.9  --  18.9 19.3   CREATININE 1.35*  --  1.23 1.09   CALCIUM 9.1  --  8.9 9.1   PH  --  7.430  --   --    MG  --   --  1.94  --    ALBUMIN 4.2  --  3.7 3.8   ALKPHOS 109  --  100 103   ALT 16  --  11 12   AST 29  --  17 20   BILITOT 0.5  --  0.5 0.6     Microbiology Results (last 7 days)       Procedure Component Value Units Date/Time    Blood Culture [[id number]]  (Normal) Collected: 04/17/25 2107    Order Status: Completed Specimen: Blood Updated: 04/19/25 2300     Blood Culture No Growth At 48 Hours    Blood Culture [[id number]]  (Normal) Collected: 04/17/25 2119    Order Status: Completed Specimen: Blood Updated: 04/19/25 2300     Blood Culture No Growth At 48 Hours             See below for  Radiology    Assessment/Plan:  Acute metabolic encephalopathy: resolved   Mild vascular congestion: Stable   HTN, benign   KELSEY on CPAP at home   Alzheimer's/dementia   Generalized weakness      Plan:  Patients mental status is baseline now.   Nonobstructive hydrocephalus per CT scan prelim read, which was not mentioned in the final read of CT head, however there was diffuse parenchymal volume loss with stable size of the ventricles.   Will continue OT/PT     He does have a high risk of delirium  Will place on delirium precautions  Hold vitals 7p-7a   Needs good sleep wake cycle      Continue strict aspiration, fall and decubitus precautions    Labs and vitals stable         VTE prophylaxis: Lovenox      Patient condition:  Fair    Anticipated discharge and Disposition:         All diagnosis and differential diagnosis have been reviewed; assessment and plan has been documented; I have personally reviewed the labs and test results that are presently available; I have reviewed the patients medication list; I have reviewed the consulting providers response and recommendations. I have reviewed or attempted to review medical records based upon their availability    All of the patient's questions have been  addressed and answered. Patient's is agreeable to the above stated plan. I will continue to monitor closely and make adjustments to medical management as needed.    Portions of this note dictated using EMR integrated voice recognition software, and may be subject to voice recognition errors not corrected at proofreading. Please contact writer for clarification if needed.   _____________________________________________________________________    Malnutrition Status:  Nutrition consulted. Most recent weight and BMI monitored-     Measurements:  Wt Readings from Last 1 Encounters:   04/18/25 96.5 kg (212 lb 12.8 oz)   Body mass index is 33.33 kg/m².    Patient has been screened and assessed by RD.    Malnutrition  Type:  Context:    Level:      Malnutrition Characteristic Summary:       Interventions/Recommendations (treatment strategy):        Scheduled Med:   aspirin  81 mg Oral Daily    atorvastatin  40 mg Oral Daily    enoxparin  40 mg Subcutaneous Daily    ferrous sulfate  1 tablet Oral Daily    lisinopriL  5 mg Oral Daily    memantine  10 mg Oral BID    multivitamin  1 tablet Oral Daily    sertraline  75 mg Oral Daily    tamsulosin  0.4 mg Oral Daily      Continuous Infusions:     PRN Meds:    Current Facility-Administered Medications:     acetaminophen, 650 mg, Oral, Q4H PRN    albuterol-ipratropium, 3 mL, Nebulization, Q6H PRN    aluminum-magnesium hydroxide-simethicone, 30 mL, Oral, QID PRN    bisacodyL, 10 mg, Rectal, Daily PRN    dextrose 50%, 12.5 g, Intravenous, PRN    dextrose 50%, 25 g, Intravenous, PRN    glucagon (human recombinant), 1 mg, Intramuscular, PRN    glucose, 16 g, Oral, PRN    glucose, 24 g, Oral, PRN    melatonin, 9 mg, Oral, Nightly PRN    naloxone, 0.02 mg, Intravenous, PRN    ondansetron, 8 mg, Oral, Q8H PRN    ondansetron, 4 mg, Intravenous, Q8H PRN    polyethylene glycol, 17 g, Oral, TID PRN    simethicone, 1 tablet, Oral, QID PRN    sodium chloride 0.9%, 10 mL, Intravenous, PRN     Radiology:  I have personally reviewed the following imaging and agree with the radiologist.     Echo    Overall the study quality was technically difficult.    Left Ventricle: Left ventricle was not well visualized due to poor   sonic window. There is normal systolic function with a visually estimated   ejection fraction of 60 - 65%. There is diastolic dysfunction.    Aortic Valve: There is trace aortic regurgitation.    IVC/SVC: Normal venous pressure at 3 mmHg.    Pericardium: There is no pericardial effusion.      Jairo Soliman MD  Department of Hospital Medicine   [facility]   04/20/2025

## 2025-04-21 PROCEDURE — 97530 THERAPEUTIC ACTIVITIES: CPT | Mod: CO

## 2025-04-21 PROCEDURE — G0378 HOSPITAL OBSERVATION PER HR: HCPCS

## 2025-04-21 PROCEDURE — 25000003 PHARM REV CODE 250: Performed by: INTERNAL MEDICINE

## 2025-04-21 PROCEDURE — 25000003 PHARM REV CODE 250: Performed by: NURSE PRACTITIONER

## 2025-04-21 PROCEDURE — 25000003 PHARM REV CODE 250: Performed by: STUDENT IN AN ORGANIZED HEALTH CARE EDUCATION/TRAINING PROGRAM

## 2025-04-21 PROCEDURE — 96372 THER/PROPH/DIAG INJ SC/IM: CPT | Performed by: STUDENT IN AN ORGANIZED HEALTH CARE EDUCATION/TRAINING PROGRAM

## 2025-04-21 PROCEDURE — 63600175 PHARM REV CODE 636 W HCPCS: Performed by: STUDENT IN AN ORGANIZED HEALTH CARE EDUCATION/TRAINING PROGRAM

## 2025-04-21 RX ADMIN — FERROUS SULFATE TAB 325 MG (65 MG ELEMENTAL FE) 1 EACH: 325 (65 FE) TAB at 10:04

## 2025-04-21 RX ADMIN — LISINOPRIL 5 MG: 5 TABLET ORAL at 10:04

## 2025-04-21 RX ADMIN — ENOXAPARIN SODIUM 40 MG: 40 INJECTION SUBCUTANEOUS at 05:04

## 2025-04-21 RX ADMIN — MEMANTINE 10 MG: 5 TABLET ORAL at 10:04

## 2025-04-21 RX ADMIN — POLYETHYLENE GLYCOL 3350 17 G: 17 POWDER, FOR SOLUTION ORAL at 10:04

## 2025-04-21 RX ADMIN — MULTIVITAMIN TABLET 1 TABLET: TABLET at 10:04

## 2025-04-21 RX ADMIN — TAMSULOSIN HYDROCHLORIDE 0.4 MG: 0.4 CAPSULE ORAL at 10:04

## 2025-04-21 RX ADMIN — MEMANTINE 10 MG: 5 TABLET ORAL at 09:04

## 2025-04-21 RX ADMIN — SERTRALINE HYDROCHLORIDE 75 MG: 50 TABLET ORAL at 10:04

## 2025-04-21 RX ADMIN — ATORVASTATIN CALCIUM 40 MG: 40 TABLET, FILM COATED ORAL at 10:04

## 2025-04-21 RX ADMIN — ASPIRIN 81 MG: 81 TABLET, COATED ORAL at 10:04

## 2025-04-21 NOTE — PLAN OF CARE
04/21/25 1146   Discharge Assessment   Assessment Type Discharge Planning Assessment   Confirmed/corrected address, phone number and insurance Yes   Confirmed Demographics Correct on Facesheet   Source of Information patient;family   Communicated ALVARO with patient/caregiver Date not available/Unable to determine   Reason For Admission AMS, CHF encephalopathy   People in Home spouse   Do you expect to return to your current living situation? Yes   Do you have help at home or someone to help you manage your care at home? Yes   Who are your caregiver(s) and their phone number(s)? spouse Ivania Foster   Prior to hospitilization cognitive status: Alert/Oriented   Current cognitive status: Alert/Oriented   Walking or Climbing Stairs Difficulty yes   Walking or Climbing Stairs ambulation difficulty, requires equipment;stair climbing difficulty, requires equipment;transferring difficulty, requires equipment   Mobility Management RW,  wheelchair straight cane lift chair prn mobility   Dressing/Bathing Difficulty yes   Dressing/Bathing bathing difficulty, requires equipment;dressing difficulty, requires equipment   Dressing/Bathing Management grab bar shower chair   Home Accessibility wheelchair accessible   Equipment Currently Used at Home wheelchair;walker, rolling;shower chair   Readmission within 30 days? No   Patient currently being followed by outpatient case management? No   Do you currently have service(s) that help you manage your care at home? Yes   Name and Contact number of agency HH but does not remember the name   Is the pt/caregiver preference to resume services with current agency No   Do you take prescription medications? Yes   Do you have prescription coverage? Yes   Coverage medicare   Do you have any problems affording any of your prescribed medications? TBD   Is the patient taking medications as prescribed? yes   How do you get to doctors appointments? family or friend will provide   Are you on  dialysis? No   Do you take coumadin? No   Discharge Plan A Skilled Nursing Facility   Discharge Plan B Home Health   DME Needed Upon Discharge  none   Discharge Plan discussed with: Patient;Spouse/sig other   Transition of Care Barriers None

## 2025-04-21 NOTE — PLAN OF CARE
Patient is in obs and does not have a 3 MN qualifying stay in the last 30 days to qualify for placement. His current level of functioning is not high enough for rehab   Anxiety    Asthma

## 2025-04-21 NOTE — PLAN OF CARE
PASRR completed. Does not meet inpatient criteria after further review will discuss with wife possible long term placement.

## 2025-04-21 NOTE — PT/OT/SLP PROGRESS
Occupational Therapy   Treatment    Name: Rojas Foster  MRN: [id number]  Admitting Diagnosis:  Encephalopathy       Recommendations:     Recommended therapy intensity at discharge: Moderate Intensity Therapy   Discharge Equipment Recommendations:  to be determined by next level of care  Barriers to discharge:       Assessment:     Rojas Foster is a 80 y.o. male with a medical diagnosis of Encephalopathy.  He presents with increased lethargy, poor activity tolerance and endurance, pt. Is a fall risk at this time, recommending Mod intensity therapy pending progress. Performance deficits affecting function are impaired endurance, weakness, impaired self care skills, impaired functional mobility, gait instability, impaired balance, decreased upper extremity function, decreased lower extremity function, decreased coordination, visual deficits, decreased safety awareness.     Rehab Prognosis:  Fair; patient would benefit from acute skilled OT services to address these deficits and reach maximum level of function.       Plan:     Patient to be seen 3 x/week to address the above listed problems via self-care/home management, therapeutic activities, therapeutic exercises  Plan of Care Expires: 05/18/25  Plan of Care Reviewed with: patient    Subjective     Pain/Comfort:       Objective:     Communicated with: RN prior to session.  Patient found HOB elevated with   upon OT entry to room.    General Precautions: Standard, fall    Orthopedic Precautions:N/A  Braces: N/A  Respiratory Status: Room air       Occupational Performance:   (Bed Mobility-Max A)  (Sitting balance- Max A) Posterior lean noted. Pt. Falling asleep EOB, constant cueing required to stay awake.   Total A for donning socks.  (Sit to stand- Max A) using RW for UE support with balance. Strong posterior lean noted, unable to side step to HOB.   Pt. Laid back down and repositioned in bed appropriately with all needs within reach.    Therapeutic  Positioning    OT interventions performed during the course of today's session in an effort to prevent and/or reduce acquired pressure injuries:   Therapeutic positioning was provided at the conclusion of session to offload all bony prominences for the prevention and/or reduction of pressure injuries        [facility] 6 Click ADL:      Patient Education:  Patient provided with verbal education education regarding fall prevention, safety awareness, and pressure ulcer prevention.  Additional teaching is warranted.      Patient left HOB elevated with all lines intact and call button in reach.    GOALS:   Multidisciplinary Problems       Occupational Therapy Goals          Problem: Occupational Therapy    Goal Priority Disciplines Outcome Interventions   Occupational Therapy Goal     OT, PT/OT Progressing    Description: goals to be met by 5/18/25    Pt will complete grooming standing at sink with LRAD with CGA.  Pt will complete UB dressing with Liliane.  Pt will complete LB dressing with modA using LRAD and AE PRN.  Pt will complete functional mobility to/from toilet and toilet transfer with CGA using LRAD.                        Time Tracking:     OT Date of Treatment: 04/21/25  OT Start Time: 0934  OT Stop Time: 1000  OT Total Time (min): 26 min    Billable Minutes:Therapeutic Activity 2    OT/DERRICK: DERRICK     Number of DERRICK visits since last OT visit: 1    4/21/2025

## 2025-04-21 NOTE — PROGRESS NOTES
[facility] Hospital Medicine Progress Note        Chief Complaint: Inpatient Follow-up     HPI:   Rojas Foster is a 80 y.o. male who PMH includes Alzheimers dementia; anxiety, depression, chronic arthritis, HTN, HLD, TIA; presents to the ED at [facility] on 4/17/2025 with a primary complaint of per family reports of lethargy with worsening AMS and confusion. PT was seen by home health services today and was directed to come to the ED for further evaluation. Family reports pt did have cough, no reports of fever, vomiting, diarrhea or any known sick contacts.  Patient is a poor historian and can not provide any historical information at this time.  Wife is at the bedside providing majority of the history.  Lab work reviewed demonstrated H&H 11.5/35.8, other indices unremarkable.  Chest x-ray impression reviewed demonstrated cardiomegaly with central vascular congestion, no evidence of acute intrathoracic process.  CT of head without contrast impression reviewed demonstrated no acute intracranial process identified; the ventricles, sulci and basal cisterns demonstrates stable mild prominence consistent with global cerebral atrophy, ventricles appear dilated out of proportion to the sulci suggestion an element of stable appearing nonobstructive hydrocephalus.  Initial vital signs /90 pulse 92 respirations 18 temperature 99.3° F O2 saturation 97% on 2 L per nasal cannula.  Patient did have slight drop in saturations which he was placed on CPAP therapy with improvement in oxygenation and respiratory status.  Patient was still semi lethargic at the time of examination and rounds. Wife reports he has not woken up much since she found his altered prior to arrival in the ED. Pt is admitted to hospital medicine services for further management.     4/19 Patient today awake and comfortable. In a good mood. Oriented to self and place. Following all verbal commands. He is very weak and needing  total assist to get out of bed.     Interval Hx:     AF. NAEON. Right 4th toe discoloration noted, since yesterday per wife, unclear if any trauma when getting up from bed and patient unable to recall with dementia.   Getting bladder scan as no urination overnight.    Case was discussed with patient's nurse and  on the floor.    Objective/physical exam:  General: In no acute distress, afebrile  Chest: Clear to auscultation bilaterally  Heart: RRR, +S1, S2, no appreciable murmur  Abdomen: Soft, nontender, BS +  MSK: Warm, no lower extremity edema, no clubbing or cyanosis  Neurologic: Alert and oriented, moving all extremities with good strength    VITAL SIGNS: 24 HRS MIN & MAX LAST   Temp  Min: 97.5 °F (36.4 °C)  Max: 98 °F (36.7 °C) 98 °F (36.7 °C)   BP  Min: 104/69  Max: 114/74 114/74   Pulse  Min: 70  Max: 93  70   Resp  Min: 18  Max: 18 18   SpO2  Min: 93 %  Max: 95 % (!) 93 %     I have reviewed the following labs:  Recent Labs   Lab 04/17/25 1045 04/17/25 2107 04/18/25 0411   WBC 7.11 6.59 6.21   RBC 3.87* 3.67* 3.79*   HGB 12.2* 11.5* 11.6*   HCT 38.0* 35.8* 36.5*   MCV 98.2* 97.5* 96.3*   MCH 31.5* 31.3* 30.6   MCHC 32.1* 32.1* 31.8*   RDW 16.0 16.0 16.0    183 189   MPV 10.8* 10.0 10.0     Recent Labs   Lab 04/17/25 1045 04/17/25 2013 04/17/25 2107 04/18/25 0411     --  139 138   K 5.1  --  4.2 3.7     --  105 103   CO2 23  --  26 25   BUN 21.9  --  18.9 19.3   CREATININE 1.35*  --  1.23 1.09   CALCIUM 9.1  --  8.9 9.1   PH  --  7.430  --   --    MG  --   --  1.94  --    ALBUMIN 4.2  --  3.7 3.8   ALKPHOS 109  --  100 103   ALT 16  --  11 12   AST 29  --  17 20   BILITOT 0.5  --  0.5 0.6     Microbiology Results (last 7 days)       Procedure Component Value Units Date/Time    Blood Culture [[id number]]  (Normal) Collected: 04/17/25 2107    Order Status: Completed Specimen: Blood Updated: 04/20/25 2300     Blood Culture No Growth At 72 Hours    Blood Culture [[id number]]   (Normal) Collected: 04/17/25 2119    Order Status: Completed Specimen: Blood Updated: 04/20/25 2300     Blood Culture No Growth At 72 Hours             See below for Radiology    Assessment/Plan:  Acute metabolic encephalopathy: resolved   Mild vascular congestion: Stable   HTN, benign   KELSEY on CPAP at home   Alzheimer's/dementia   Generalized weakness      Plan:  Right 4th toe discoloration noted, since 4/19 per wife, unclear if any trauma when getting up from bed and patient unable to recall with dementia  Xray foot Possible minimally displaced 4th distal phalanx fracture. If needed, 4th toe radiographs can be pursued for further assessment. Ortho eval, Neurovascular checks  Getting bladder scan as no urination overnight    Patients mental status is baseline now.   Nonobstructive hydrocephalus per CT scan prelim read, which was not mentioned in the final read of CT head, however there was diffuse parenchymal volume loss with stable size of the ventricles.   Will continue OT/PT     He does have a high risk of delirium  Will place on delirium precautions  Hold vitals 7p-7a   Needs good sleep wake cycle      Continue strict aspiration, fall and decubitus precautions    Labs and vitals stable         VTE prophylaxis: Lovenox      Patient condition:  Fair    Anticipated discharge and Disposition:         All diagnosis and differential diagnosis have been reviewed; assessment and plan has been documented; I have personally reviewed the labs and test results that are presently available; I have reviewed the patients medication list; I have reviewed the consulting providers response and recommendations. I have reviewed or attempted to review medical records based upon their availability    All of the patient's questions have been  addressed and answered. Patient's is agreeable to the above stated plan. I will continue to monitor closely and make adjustments to medical management as needed.    Portions of this note  dictated using EMR integrated voice recognition software, and may be subject to voice recognition errors not corrected at proofreading. Please contact writer for clarification if needed.   _____________________________________________________________________    Malnutrition Status:  Nutrition consulted. Most recent weight and BMI monitored-     Measurements:  Wt Readings from Last 1 Encounters:   04/18/25 96.5 kg (212 lb 12.8 oz)   Body mass index is 33.33 kg/m².    Patient has been screened and assessed by RD.    Malnutrition Type:  Context:    Level:      Malnutrition Characteristic Summary:       Interventions/Recommendations (treatment strategy):        Scheduled Med:   aspirin  81 mg Oral Daily    atorvastatin  40 mg Oral Daily    enoxparin  40 mg Subcutaneous Daily    ferrous sulfate  1 tablet Oral Daily    lisinopriL  5 mg Oral Daily    memantine  10 mg Oral BID    multivitamin  1 tablet Oral Daily    polyethylene glycol  17 g Oral Daily    sertraline  75 mg Oral Daily    tamsulosin  0.4 mg Oral Daily      Continuous Infusions:     PRN Meds:    Current Facility-Administered Medications:     acetaminophen, 650 mg, Oral, Q4H PRN    albuterol-ipratropium, 3 mL, Nebulization, Q6H PRN    aluminum-magnesium hydroxide-simethicone, 30 mL, Oral, QID PRN    bisacodyL, 10 mg, Rectal, Daily PRN    dextrose 50%, 12.5 g, Intravenous, PRN    dextrose 50%, 25 g, Intravenous, PRN    glucagon (human recombinant), 1 mg, Intramuscular, PRN    glucose, 16 g, Oral, PRN    glucose, 24 g, Oral, PRN    melatonin, 9 mg, Oral, Nightly PRN    naloxone, 0.02 mg, Intravenous, PRN    ondansetron, 8 mg, Oral, Q8H PRN    ondansetron, 4 mg, Intravenous, Q8H PRN    polyethylene glycol, 17 g, Oral, TID PRN    simethicone, 1 tablet, Oral, QID PRN    sodium chloride 0.9%, 10 mL, Intravenous, PRN     Radiology:  I have personally reviewed the following imaging and agree with the radiologist.     Echo    Overall the study quality was technically  difficult.    Left Ventricle: Left ventricle was not well visualized due to poor   sonic window. There is normal systolic function with a visually estimated   ejection fraction of 60 - 65%. There is diastolic dysfunction.    Aortic Valve: There is trace aortic regurgitation.    IVC/SVC: Normal venous pressure at 3 mmHg.    Pericardium: There is no pericardial effusion.      Jairo Soliman MD  Department of Hospital Medicine   [facility]   04/21/2025

## 2025-04-21 NOTE — PLAN OF CARE
Problem: Adult Inpatient Plan of Care  Goal: Plan of Care Review  Outcome: Progressing  Goal: Absence of Hospital-Acquired Illness or Injury  Outcome: Progressing  Goal: Optimal Comfort and Wellbeing  Outcome: Progressing  Goal: Readiness for Transition of Care  Outcome: Progressing     Problem: Infection  Goal: Absence of Infection Signs and Symptoms  Outcome: Progressing     Problem: Fall Injury Risk  Goal: Absence of Fall and Fall-Related Injury  Outcome: Progressing     Problem: Skin Injury Risk Increased  Goal: Skin Health and Integrity  Outcome: Progressing

## 2025-04-22 VITALS
HEIGHT: 67 IN | SYSTOLIC BLOOD PRESSURE: 130 MMHG | DIASTOLIC BLOOD PRESSURE: 81 MMHG | BODY MASS INDEX: 33.4 KG/M2 | OXYGEN SATURATION: 95 % | TEMPERATURE: 98 F | RESPIRATION RATE: 16 BRPM | HEART RATE: 70 BPM | WEIGHT: 212.81 LBS

## 2025-04-22 LAB
BACTERIA BLD CULT: NORMAL
BACTERIA BLD CULT: NORMAL

## 2025-04-22 PROCEDURE — 97530 THERAPEUTIC ACTIVITIES: CPT | Mod: CQ

## 2025-04-22 PROCEDURE — 97535 SELF CARE MNGMENT TRAINING: CPT | Mod: CO

## 2025-04-22 PROCEDURE — 25000003 PHARM REV CODE 250: Performed by: NURSE PRACTITIONER

## 2025-04-22 PROCEDURE — 99214 OFFICE O/P EST MOD 30 MIN: CPT | Mod: ,,,

## 2025-04-22 PROCEDURE — 25000003 PHARM REV CODE 250: Performed by: INTERNAL MEDICINE

## 2025-04-22 PROCEDURE — G0378 HOSPITAL OBSERVATION PER HR: HCPCS

## 2025-04-22 PROCEDURE — 25000003 PHARM REV CODE 250: Performed by: STUDENT IN AN ORGANIZED HEALTH CARE EDUCATION/TRAINING PROGRAM

## 2025-04-22 RX ORDER — TAMSULOSIN HYDROCHLORIDE 0.4 MG/1
0.4 CAPSULE ORAL DAILY
Start: 2025-04-23 | End: 2026-04-23

## 2025-04-22 RX ADMIN — SERTRALINE HYDROCHLORIDE 75 MG: 50 TABLET ORAL at 08:04

## 2025-04-22 RX ADMIN — ASPIRIN 81 MG: 81 TABLET, COATED ORAL at 08:04

## 2025-04-22 RX ADMIN — ACETAMINOPHEN 650 MG: 325 TABLET ORAL at 02:04

## 2025-04-22 RX ADMIN — POLYETHYLENE GLYCOL 3350 17 G: 17 POWDER, FOR SOLUTION ORAL at 08:04

## 2025-04-22 RX ADMIN — ATORVASTATIN CALCIUM 40 MG: 40 TABLET, FILM COATED ORAL at 08:04

## 2025-04-22 RX ADMIN — TAMSULOSIN HYDROCHLORIDE 0.4 MG: 0.4 CAPSULE ORAL at 08:04

## 2025-04-22 RX ADMIN — MULTIVITAMIN TABLET 1 TABLET: TABLET at 08:04

## 2025-04-22 RX ADMIN — FERROUS SULFATE TAB 325 MG (65 MG ELEMENTAL FE) 1 EACH: 325 (65 FE) TAB at 08:04

## 2025-04-22 RX ADMIN — LISINOPRIL 5 MG: 5 TABLET ORAL at 08:04

## 2025-04-22 RX ADMIN — MEMANTINE 10 MG: 5 TABLET ORAL at 08:04

## 2025-04-22 NOTE — DISCHARGE SUMMARY
[facility] Hospital Medicine Discharge Summary    Admit Date: 4/17/2025  Discharge Date and Time: 4/22/20252:58 PM  Admitting Physician:  Team  Discharging Physician: Jairo Soliman MD.  Primary Care Physician: Pedro Devlin MD  Consults: Orthopedic Surgery    Discharge Diagnoses:    Acute metabolic encephalopathy, unclear etiology, likely delirium in the setting of dementia, resolved   Mild vascular congestion: Stable   Minimally displaced 4th distal phalanx fracture   HTN, benign   KLESEY on CPAP at home   Alzheimer's/dementia   Generalized weakness     Hospital Course:     Rojas Foster is a 80 y.o. male who PMH includes Alzheimers dementia; anxiety, depression, chronic arthritis, HTN, HLD, TIA; presents to the ED at [facility] on 4/17/2025 with a primary complaint of per family reports of lethargy with worsening AMS and confusion. PT was seen by home health services today and was directed to come to the ED for further evaluation. Family reports pt did have cough, no reports of fever, vomiting, diarrhea or any known sick contacts.  Patient is a poor historian and can not provide any historical information at this time.  Wife is at the bedside providing majority of the history.  Lab work reviewed demonstrated H&H 11.5/35.8, other indices unremarkable.  Chest x-ray impression reviewed demonstrated cardiomegaly with central vascular congestion, no evidence of acute intrathoracic process.  Nonobstructive hydrocephalus per CT scan prelim read, which was not mentioned in the final read of CT head, however there was diffuse parenchymal volume loss with stable size of the ventricles. UA without concern for infection.     Initial vital signs /90 pulse 92 respirations 18 temperature 99.3° F O2 saturation 97% on 2 L per nasal cannula.  Patient did have slight drop in saturations which he was placed on CPAP therapy with improvement in oxygenation and respiratory status.  Patient was still  semi lethargic at the time of examination and rounds. Wife reports he has not woken up much since she found his altered prior to arrival in the ED. Pt is admitted to hospital medicine services for further management. 4/19 Patient today awake and comfortable. In a good mood. Oriented to self and place. Following all verbal commands. He is very weak and needing total assist to get out of bed. 4/21 Right 4th toe discoloration noted, since yesterday per wife, unclear if any trauma when getting up from bed and patient unable to recall with dementia. Xray with minimally displaced fracture to right 4th distal phalanx of LE. Ortho eval with No intervention indicated.  Patient may weightbear as tolerated in a stiff-soled shoe or postoperative shoe.  If patient has pain, may keo tape the 3rd and 4th right lower extremity digits together for 1-2 weeks.     Patient being discharged to rehab with medications and follow up instructions as below.    Discussed regarding ER precautions and when to return to ER if needed.  Labs in 1 week following discharge with primary care physician.    Pt was seen and examined on the day of discharge  Vitals:  VITAL SIGNS: 24 HRS MIN & MAX LAST   Temp  Min: 97.5 °F (36.4 °C)  Max: 98.2 °F (36.8 °C) 97.5 °F (36.4 °C)   BP  Min: 107/72  Max: 121/76 109/73   Pulse  Min: 73  Max: 84  80   Resp  Min: 16  Max: 20 20   SpO2  Min: 92 %  Max: 95 % (!) 94 %       Physical Exam:  General: In no acute distress, afebrile  Chest: Clear to auscultation bilaterally  Heart: RRR, +S1, S2, no appreciable murmur  Abdomen: Soft, nontender, BS +  MSK: Warm, no lower extremity edema, no clubbing or cyanosis  Right 4th toe discoloration improving  Neurologic: Alert and oriented, moving all extremities with good strength    Procedures Performed: No admission procedures for hospital encounter.     Significant Diagnostic Studies: See Full reports for all details    Recent Labs   Lab 04/17/25  1044 04/17/25  6422  04/18/25 0411   WBC 7.11 6.59 6.21   RBC 3.87* 3.67* 3.79*   HGB 12.2* 11.5* 11.6*   HCT 38.0* 35.8* 36.5*   MCV 98.2* 97.5* 96.3*   MCH 31.5* 31.3* 30.6   MCHC 32.1* 32.1* 31.8*   RDW 16.0 16.0 16.0    183 189   MPV 10.8* 10.0 10.0       Recent Labs   Lab 04/17/25  1045 04/17/25 2013 04/17/25 2107 04/18/25 0411     --  139 138   K 5.1  --  4.2 3.7     --  105 103   CO2 23  --  26 25   BUN 21.9  --  18.9 19.3   CREATININE 1.35*  --  1.23 1.09   CALCIUM 9.1  --  8.9 9.1   PH  --  7.430  --   --    MG  --   --  1.94  --    ALBUMIN 4.2  --  3.7 3.8   ALKPHOS 109  --  100 103   ALT 16  --  11 12   AST 29  --  17 20   BILITOT 0.5  --  0.5 0.6        Microbiology Results (last 7 days)       Procedure Component Value Units Date/Time    Blood Culture [[id number]]  (Normal) Collected: 04/17/25 2107    Order Status: Completed Specimen: Blood Updated: 04/21/25 2301     Blood Culture No Growth At 96 Hours    Blood Culture [[id number]]  (Normal) Collected: 04/17/25 2119    Order Status: Completed Specimen: Blood Updated: 04/21/25 2301     Blood Culture No Growth At 96 Hours             X-Ray Foot 2 View Right  Narrative: EXAMINATION:  XR FOOT 2 VIEW RIGHT    CLINICAL HISTORY:  right 4th toe discoloration, unclear if trauma;    TECHNIQUE:  Frontal and lateral radiographs of the right foot    COMPARISON:  None    FINDINGS:  Possible minimally displaced fracture involving the base of the 4th distal phalanx, only seen on one view.  No other acute fracture appreciated.  Joint alignments are maintained.  Impression: Possible minimally displaced 4th distal phalanx fracture.  If needed, 4th toe radiographs can be pursued for further assessment.    Electronically signed by: Clem Thornton  Date:    04/21/2025  Time:    16:14         Medication List        START taking these medications      tamsulosin 0.4 mg Cap  Commonly known as: FLOMAX  Take 1 capsule (0.4 mg total) by mouth once daily.  Start taking on: April  23, 2025            CONTINUE taking these medications      alendronate 35 MG tablet  Commonly known as: FOSAMAX     aspirin 81 MG EC tablet  Commonly known as: ECOTRIN  Take 1 tablet (81 mg total) by mouth once daily.     atorvastatin 40 MG tablet  Commonly known as: LIPITOR  Take 1 tablet (40 mg total) by mouth once daily.     ferrous sulfate 325 (65 FE) MG EC tablet  Take 1 tablet (325 mg total) by mouth once daily.     hydrOXYzine pamoate 50 MG Cap  Commonly known as: VISTARIL  Take 1 capsule (50 mg total) by mouth nightly as needed (insomnia).     lisinopriL 5 MG tablet  Commonly known as: PRINIVIL,ZESTRIL  Take 1 tablet (5 mg total) by mouth once daily.     memantine 10 MG Tab  Commonly known as: NAMENDA  Take 1 tablet (10 mg total) by mouth 2 (two) times daily.     multivitamin Tab  Take 1 tablet by mouth once daily.     polyethylene glycol 17 gram Pwpk  Commonly known as: GLYCOLAX  Take 17 g by mouth once daily.     pregabalin 100 MG capsule  Commonly known as: LYRICA     sertraline 25 MG tablet  Commonly known as: ZOLOFT  Take 3 tablets (75 mg total) by mouth once daily.     traMADoL 50 mg tablet  Commonly known as: ULTRAM  Take 1 tablet (50 mg total) by mouth every 6 (six) hours as needed for Pain.               Where to Get Your Medications        Information about where to get these medications is not yet available    Ask your nurse or doctor about these medications  tamsulosin 0.4 mg Cap          Explained in detail to the patient about the discharge plan, medications, and follow-up visits. Pt understands and agrees with the treatment plan  Discharge Disposition:  Rehab  Discharged Condition: stable  Diet-   Dietary Orders (From admission, onward)       Start     Ordered    04/18/25 1128  Diet Soft & Bite Sized (IDDSI Level 6) Supervision with Meals  Diet effective now        Comments: Meds crushed in pudding with oral check   Question:  Diet Modifier:  Answer:  Supervision with Meals    04/18/25 1128                    Medications Per NM med rec  Activities as tolerated   Follow-up Information       Pedro Devlin MD Follow up in 1 week(s).    Specialty: Internal Medicine  Why: with CBC and CMP  Contact information:  [address]  [place] [id number] [phone]                           For further questions contact hospitalist office    Discharge time 33 minutes    For worsening symptoms, chest pain, shortness of breath, increased abdominal pain, high grade fever, stroke or stroke like symptoms, immediately go to the nearest Emergency Room or call 911 as soon as possible.      Jairo Dillon M.D, on 4/22/2025. at 2:58 PM.

## 2025-04-22 NOTE — PLAN OF CARE
Discharge orders sent to [facility] via [facility]. Asked UR nurse admission status noted still in obs

## 2025-04-22 NOTE — PT/OT/SLP PROGRESS
Physical Therapy Treatment    Patient Name:  Rojas Foster   MRN:  [id number]    Recommendations:     Discharge therapy intensity: Moderate Intensity Therapy   Discharge Equipment Recommendations:  (TBD)  Barriers to discharge:  Placement    Assessment:     Rojas Foster is a 80 y.o. male admitted with a medical diagnosis of  acute encephalopathy, acute respiratory failure, hypertension urgency, nonobstructive hydrocephalus, Alzheimer's, dementia.  He presents with the following impairments/functional limitations: weakness, gait instability, impaired endurance, impaired functional mobility, decreased lower extremity function .   Pt requires maxA x2 for standing with RW. Unable to perform gait training 2/2 weakness. Pt is unsafe to go home at this time.    Rehab Prognosis: Good; patient would benefit from acute skilled PT services to address these deficits and reach maximum level of function.    Recent Surgery: * No surgery found *      Plan:     During this hospitalization, patient would benefit from acute PT services 5 x/week to address the identified rehab impairments via gait training, therapeutic activities, therapeutic exercises and progress toward the following goals:    Plan of Care Expires:  05/17/25    Subjective     Chief Complaint: Toe discomfort  Patient/Family Comments/goals: return to PLOF   Pain/Comfort:         Objective:     Communicated with rn prior to session.  Patient found HOB elevated with peripheral IV, PureWick, pulse ox (continuous), telemetry upon PT entry to room.     General Precautions: Standard, fall  Orthopedic Precautions: N/A  Braces: N/A  Respiratory Status: Room air  Skin Integrity: Visible skin intact      Functional Mobility:  Bed Mobility:     Rolling Left:  moderate assistance  Rolling Right: moderate assistance  Supine to Sit: moderate assistance  Sit to Supine: maximal assistance and of 2 persons  Transfers:     Sit to Stand:  maximal assistance and of 2 persons with  rolling walker    Therapeutic Activities/Exercises:  Pt sat EOB 15min cga with L lat lean, performed dynamic sitting balance while performing ADLs Liliane increased L lat lean noted multiple cues needed to revert to midline  Performed 2 sit<>stands maxA X2 with RW. Pt unable to properly WB through LLE, increased ext rot noted. Hips barely cleared from bed.     Education:  Patient provided with verbal education education regarding PT role/goals/POC, fall prevention, and safety awareness.  Understanding was verbalized, however additional teaching warranted.     Patient left with bed in chair position with all lines intact, call button in reach, RN notified, and wife present    GOALS:   Multidisciplinary Problems       Physical Therapy Goals          Problem: Physical Therapy    Goal Priority Disciplines Outcome Interventions   Physical Therapy Goal     PT, PT/OT Progressing    Description: Goals to be met by: d/c     Patient will increase functional independence with mobility by performin. Supine to sit with Stand-by Assistance  2. Sit to supine with Stand-by Assistance  3. Sit to stand transfer with Stand-by Assistance  4. Bed to chair transfer with Stand-by Assistance using Rolling Walker  5. Gait  x 50 feet with Stand-by Assistance using Rolling Walker.                          Time Tracking:     PT Received On: 25  PT Start Time: 905     PT Stop Time: 932  PT Total Time (min): 27 min     Billable Minutes: Therapeutic Activity 27    Treatment Type: Treatment  PT/PTA: PTA     Number of PTA visits since last PT visit: 2025

## 2025-04-22 NOTE — PT/OT/SLP PROGRESS
Occupational Therapy   Treatment    Name: Rojas Foster  MRN: [id number]  Admitting Diagnosis:  Encephalopathy    acute encephalopathy, acute respiratory failure, hypertension urgency, nonobstructive hydrocephalus, Alzheimer's, dementia.     Recommendations:     Recommended therapy intensity at discharge: Moderate Intensity Therapy   Discharge Equipment Recommendations:  to be determined by next level of care  Barriers to discharge:       Assessment:     Rojas Foster is a 80 y.o. male with a medical diagnosis of Encephalopathy.  He presents with increased B LE weakness, minimal pain (R foot). Pt. Is a high fall risk recommending Mod intensity therapy pending progress. Performance deficits affecting function are impaired endurance, weakness, impaired self care skills, impaired functional mobility, gait instability, impaired balance, decreased upper extremity function, decreased lower extremity function, decreased coordination, visual deficits, decreased safety awareness.     Rehab Prognosis:  Fair; patient would benefit from acute skilled OT services to address these deficits and reach maximum level of function.       Plan:     Patient to be seen 3 x/week to address the above listed problems via self-care/home management, therapeutic activities, therapeutic exercises  Plan of Care Expires: 05/18/25  Plan of Care Reviewed with: patient    Subjective     Pain/Comfort:       Objective:     Communicated with: RN prior to session.  Patient found HOB elevated with   upon OT entry to room.    General Precautions: Standard, fall    Orthopedic Precautions:N/A  Braces: N/A  Respiratory Status: Room air       Occupational Performance:   (Bed Mobility- Max A)  (Sitting balance- Min A L lateral lean noted.)  Pt. Performing grooming task (brushing teeth) with appropriate preparation and setup.   (Sit to stand- Max A) using RW for UE support with balance. BM noted in standing, pt. Laid back down and total A for pericare. Left  repositioned in bed appropriately.     Therapeutic Positioning    OT interventions performed during the course of today's session in an effort to prevent and/or reduce acquired pressure injuries:   Therapeutic positioning was provided at the conclusion of session to offload all bony prominences for the prevention and/or reduction of pressure injuries      [facility] 6 Click ADL:      Patient Education:  Patient provided with verbal education education regarding fall prevention, safety awareness, and pressure ulcer prevention.  Additional teaching is warranted.      Patient left HOB elevated with all lines intact and call button in reach.    GOALS:   Multidisciplinary Problems       Occupational Therapy Goals          Problem: Occupational Therapy    Goal Priority Disciplines Outcome Interventions   Occupational Therapy Goal     OT, PT/OT Progressing    Description: goals to be met by 5/18/25    Pt will complete grooming standing at sink with LRAD with CGA.  Pt will complete UB dressing with Liliane.  Pt will complete LB dressing with modA using LRAD and AE PRN.  Pt will complete functional mobility to/from toilet and toilet transfer with CGA using LRAD.                        Time Tracking:     OT Date of Treatment: 04/22/25  OT Start Time: 0900  OT Stop Time: 0940  OT Total Time (min): 40 min    Billable Minutes:Self Care/Home Management 3    OT/DERRICK: DERRICK     Number of DERRICK visits since last OT visit: 2    4/22/2025

## 2025-04-22 NOTE — PLAN OF CARE
Spoke with Aleena Morton at [facility] discussed that the patient is still in OBS she is following. She does not have EPIC access she will need clinicals faxed or emailed if we need to send her something email is [email or web address]

## 2025-04-22 NOTE — PLAN OF CARE
Patient remains in OBS will not be able to be placed SNF will discuss with wife if prison care is an option

## 2025-04-22 NOTE — PLAN OF CARE
Spoke to patient and his wife about not meeting Snf criteria she states that he has been at [facility] rehab before in this condition. I will send a referral

## 2025-04-22 NOTE — PLAN OF CARE
04/22/25 1157   Final Note   Assessment Type Final Discharge Note   Anticipated Discharge Disposition Rehab  ([facility] rehab)   Post-Acute Status   Post-Acute Authorization Placement   Post-Acute Placement Status Set-up Complete/Auth obtained   Coverage medicare   Discharge Delays (!) Ambulance Transport/Facility Transport

## 2025-05-09 ENCOUNTER — LAB REQUISITION (OUTPATIENT)
Dept: LAB | Facility: HOSPITAL | Age: 80
End: 2025-05-09
Payer: MEDICARE

## 2025-05-09 DIAGNOSIS — I10 ESSENTIAL (PRIMARY) HYPERTENSION: ICD-10-CM

## 2025-05-09 DIAGNOSIS — E78.5 HYPERLIPIDEMIA, UNSPECIFIED: ICD-10-CM

## 2025-05-09 DIAGNOSIS — G30.9 ALZHEIMER'S DISEASE, UNSPECIFIED (CODE): ICD-10-CM

## 2025-05-09 PROBLEM — D64.9 ANEMIA: Status: RESOLVED | Noted: 2025-04-18 | Resolved: 2025-05-09

## 2025-05-09 PROBLEM — E78.00 PURE HYPERCHOLESTEROLEMIA: Chronic | Status: RESOLVED | Noted: 2025-01-28 | Resolved: 2025-05-09

## 2025-05-09 PROBLEM — E78.00 HYPERCHOLESTEREMIA: Chronic | Status: ACTIVE | Noted: 2022-08-25

## 2025-05-09 PROBLEM — E78.00 HYPERCHOLESTEREMIA: Status: ACTIVE | Noted: 2022-08-25

## 2025-05-09 LAB
25(OH)D3+25(OH)D2 SERPL-MCNC: 35 NG/ML (ref 30–80)
ALBUMIN SERPL-MCNC: 3.7 G/DL (ref 3.4–4.8)
ALBUMIN/GLOB SERPL: 1.2 RATIO (ref 1.1–2)
ALP SERPL-CCNC: 74 UNIT/L (ref 40–150)
ALT SERPL-CCNC: 19 UNIT/L (ref 0–55)
ANION GAP SERPL CALC-SCNC: 9 MEQ/L
AST SERPL-CCNC: 18 UNIT/L (ref 11–45)
BACTERIA #/AREA URNS AUTO: ABNORMAL /HPF
BASOPHILS # BLD AUTO: 0.03 X10(3)/MCL
BASOPHILS NFR BLD AUTO: 0.3 %
BILIRUB SERPL-MCNC: 0.6 MG/DL
BILIRUB UR QL STRIP.AUTO: NEGATIVE
BUN SERPL-MCNC: 28.1 MG/DL (ref 8.4–25.7)
CALCIUM SERPL-MCNC: 8.7 MG/DL (ref 8.8–10)
CHLORIDE SERPL-SCNC: 101 MMOL/L (ref 98–107)
CHOLEST SERPL-MCNC: 140 MG/DL
CHOLEST/HDLC SERPL: 3 {RATIO} (ref 0–5)
CLARITY UR: CLEAR
CO2 SERPL-SCNC: 25 MMOL/L (ref 23–31)
COLOR UR AUTO: ABNORMAL
CREAT SERPL-MCNC: 1.21 MG/DL (ref 0.72–1.25)
CREAT/UREA NIT SERPL: 23
EOSINOPHIL # BLD AUTO: 0.16 X10(3)/MCL (ref 0–0.9)
EOSINOPHIL NFR BLD AUTO: 1.5 %
ERYTHROCYTE [DISTWIDTH] IN BLOOD BY AUTOMATED COUNT: 16 % (ref 11.5–17)
FERRITIN SERPL-MCNC: 421.61 NG/ML (ref 21.81–274.66)
GFR SERPLBLD CREATININE-BSD FMLA CKD-EPI: >60 ML/MIN/1.73/M2
GLOBULIN SER-MCNC: 3 GM/DL (ref 2.4–3.5)
GLUCOSE SERPL-MCNC: 73 MG/DL (ref 82–115)
GLUCOSE UR QL STRIP: NORMAL
HCT VFR BLD AUTO: 33.3 % (ref 42–52)
HDLC SERPL-MCNC: 46 MG/DL (ref 35–60)
HGB BLD-MCNC: 11 G/DL (ref 14–18)
HGB UR QL STRIP: NEGATIVE
IMM GRANULOCYTES # BLD AUTO: 0.17 X10(3)/MCL (ref 0–0.04)
IMM GRANULOCYTES NFR BLD AUTO: 1.6 %
IRON SATN MFR SERPL: 40 % (ref 20–50)
IRON SERPL-MCNC: 93 UG/DL (ref 65–175)
KETONES UR QL STRIP: NEGATIVE
LDLC SERPL CALC-MCNC: 68 MG/DL (ref 50–140)
LEUKOCYTE ESTERASE UR QL STRIP: NEGATIVE
LYMPHOCYTES # BLD AUTO: 3.08 X10(3)/MCL (ref 0.6–4.6)
LYMPHOCYTES NFR BLD AUTO: 29.3 %
MCH RBC QN AUTO: 31 PG (ref 27–31)
MCHC RBC AUTO-ENTMCNC: 33 G/DL (ref 33–36)
MCV RBC AUTO: 93.8 FL (ref 80–94)
MONOCYTES # BLD AUTO: 1.13 X10(3)/MCL (ref 0.1–1.3)
MONOCYTES NFR BLD AUTO: 10.7 %
MUCOUS THREADS URNS QL MICRO: ABNORMAL /LPF
NEUTROPHILS # BLD AUTO: 5.95 X10(3)/MCL (ref 2.1–9.2)
NEUTROPHILS NFR BLD AUTO: 56.6 %
NITRITE UR QL STRIP: NEGATIVE
NRBC BLD AUTO-RTO: 0 %
PH UR STRIP: 6 [PH]
PLATELET # BLD AUTO: 276 X10(3)/MCL (ref 130–400)
PMV BLD AUTO: 10.2 FL (ref 7.4–10.4)
POTASSIUM SERPL-SCNC: 4.2 MMOL/L (ref 3.5–5.1)
PROT SERPL-MCNC: 6.7 GM/DL (ref 5.8–7.6)
PROT UR QL STRIP: NEGATIVE
PSA SERPL-MCNC: 19.61 NG/ML
RBC # BLD AUTO: 3.55 X10(6)/MCL (ref 4.7–6.1)
RBC #/AREA URNS AUTO: ABNORMAL /HPF
SODIUM SERPL-SCNC: 135 MMOL/L (ref 136–145)
SP GR UR STRIP.AUTO: 1.01 (ref 1–1.03)
SQUAMOUS #/AREA URNS LPF: ABNORMAL /HPF
TIBC SERPL-MCNC: 139 UG/DL (ref 60–240)
TIBC SERPL-MCNC: 232 UG/DL (ref 250–450)
TRANSFERRIN SERPL-MCNC: 199 MG/DL (ref 163–344)
TRIGL SERPL-MCNC: 131 MG/DL (ref 34–140)
TSH SERPL-ACNC: 2.1 UIU/ML (ref 0.35–4.94)
UROBILINOGEN UR STRIP-ACNC: NORMAL
VIT B12 SERPL-MCNC: 699 PG/ML (ref 213–816)
VLDLC SERPL CALC-MCNC: 26 MG/DL
WBC # BLD AUTO: 10.52 X10(3)/MCL (ref 4.5–11.5)
WBC #/AREA URNS AUTO: ABNORMAL /HPF

## 2025-05-09 PROCEDURE — 80074 ACUTE HEPATITIS PANEL: CPT | Performed by: INTERNAL MEDICINE

## 2025-05-09 PROCEDURE — 80053 COMPREHEN METABOLIC PANEL: CPT | Performed by: INTERNAL MEDICINE

## 2025-05-09 PROCEDURE — 84153 ASSAY OF PSA TOTAL: CPT | Performed by: INTERNAL MEDICINE

## 2025-05-09 PROCEDURE — 84443 ASSAY THYROID STIM HORMONE: CPT | Performed by: INTERNAL MEDICINE

## 2025-05-09 PROCEDURE — 83540 ASSAY OF IRON: CPT | Performed by: INTERNAL MEDICINE

## 2025-05-09 PROCEDURE — 87186 SC STD MICRODIL/AGAR DIL: CPT | Performed by: INTERNAL MEDICINE

## 2025-05-09 PROCEDURE — 80061 LIPID PANEL: CPT | Performed by: INTERNAL MEDICINE

## 2025-05-09 PROCEDURE — 82728 ASSAY OF FERRITIN: CPT | Performed by: INTERNAL MEDICINE

## 2025-05-09 PROCEDURE — 82306 VITAMIN D 25 HYDROXY: CPT | Performed by: INTERNAL MEDICINE

## 2025-05-09 PROCEDURE — 85025 COMPLETE CBC W/AUTO DIFF WBC: CPT | Performed by: INTERNAL MEDICINE

## 2025-05-09 PROCEDURE — 87389 HIV-1 AG W/HIV-1&-2 AB AG IA: CPT | Performed by: INTERNAL MEDICINE

## 2025-05-09 PROCEDURE — 82607 VITAMIN B-12: CPT | Performed by: INTERNAL MEDICINE

## 2025-05-09 PROCEDURE — 81001 URINALYSIS AUTO W/SCOPE: CPT | Performed by: INTERNAL MEDICINE

## 2025-05-10 LAB
HAV IGM SERPL QL IA: NONREACTIVE
HBV CORE IGM SERPL QL IA: NONREACTIVE
HBV SURFACE AG SERPL QL IA: NONREACTIVE
HCV AB SERPL QL IA: NONREACTIVE
HIV 1+2 AB+HIV1 P24 AG SERPL QL IA: NONREACTIVE

## 2025-05-12 LAB
BACTERIA UR CULT: ABNORMAL
BACTERIA UR CULT: ABNORMAL

## 2025-05-13 LAB — PATH REV: NORMAL

## 2025-06-23 ENCOUNTER — OFFICE VISIT (OUTPATIENT)
Dept: NEUROLOGY | Facility: CLINIC | Age: 80
End: 2025-06-23
Payer: MEDICARE

## 2025-06-23 VITALS — BODY MASS INDEX: 31.39 KG/M2 | WEIGHT: 200 LBS | HEIGHT: 67 IN

## 2025-06-23 DIAGNOSIS — G30.9 ALZHEIMER'S DISEASE: ICD-10-CM

## 2025-06-23 DIAGNOSIS — F02.80 ALZHEIMER'S DISEASE: ICD-10-CM

## 2025-06-23 PROCEDURE — 99214 OFFICE O/P EST MOD 30 MIN: CPT | Mod: S$PBB,,, | Performed by: PSYCHIATRY & NEUROLOGY

## 2025-06-23 PROCEDURE — 99213 OFFICE O/P EST LOW 20 MIN: CPT | Mod: PBBFAC | Performed by: PSYCHIATRY & NEUROLOGY

## 2025-06-23 PROCEDURE — 99999 PR PBB SHADOW E&M-EST. PATIENT-LVL III: CPT | Mod: PBBFAC,,, | Performed by: PSYCHIATRY & NEUROLOGY

## 2025-06-23 RX ORDER — MEMANTINE HYDROCHLORIDE 10 MG/1
10 TABLET ORAL 2 TIMES DAILY
Qty: 180 TABLET | Refills: 4
Start: 2025-06-23 | End: 2026-09-16

## 2025-06-23 RX ORDER — SERTRALINE HYDROCHLORIDE 50 MG/1
75 TABLET, FILM COATED ORAL DAILY
Qty: 135 TABLET | Refills: 4
Start: 2025-06-23 | End: 2026-09-16

## 2025-06-23 NOTE — PROGRESS NOTES
Subjective     Patient ID: Rojas Foster is a 80 y.o. male.    Chief Complaint: Alzheimer's disease    HPI  Presents for follow up of advance Alzheimer's disease.  Accompanied by wife and son who assists with the history.    One recent hospitalization for encephalopathy back in 4/2025. Wife states she was unable to wake him up, called EMS and brought him to the hospital. He was admitted for  days and then sent to rehab, unable to find source for altered mental status, stated it was the Alzheimers.   He is now back at home around his typical baseline. Continues to progress with shuffling gait and avoidance of prolonged mobility. Able to feed himself, the otherwise needs assistance with arrest of his ADLs.  Denied any worsening behavior problems at this time.   Family wanting to send pt to adult  however pt is not wanting to go.   Remains on CPAP.  Sleeping 8-9 hours with CPAP.     Review of Systems  A 14pt ROS was reviewed & is negative unless otherwise documented in the HPI       Objective     Physical Exam  GENERAL: NAD, calm, cooperative, appropriate  Awake/alert  Well groomed  Sitting in wheelchair  RESP: Normal work of breathing  No LE edema  MENTAL STATUS:  Alert.  Poor short-term memory  SPEECH/LANGUAGE: clear, fluent  CN: Perrla, eomi, vff, gaze conjugate  No tactile or motor facial asymmetry  Motor:  4/5 BLE strength  Cerebellar: no tremor or dysmetria  Sensory: normal to tactile stim/vibration  DTRs: normal +2, symmetric  Gait: not observed, able to stand       Assessment and Plan     1. Alzheimer's disease  -     memantine (NAMENDA) 10 MG Tab; Take 1 tablet (10 mg total) by mouth 2 (two) times daily.  Dispense: 180 tablet; Refill: 4    Other orders  -     sertraline (ZOLOFT) 50 MG tablet; Take 1.5 tablets (75 mg total) by mouth once daily.  Dispense: 135 tablet; Refill: 4        Continue memantine 10 mg bid  Educated family on natural progression of Alzheimer's disease and importance on  consistent routines  Patient continues to benefit from CPAP machine for his apnea       Follow up in about 1 year (around 6/23/2026).

## 2025-07-11 ENCOUNTER — LAB REQUISITION (OUTPATIENT)
Dept: LAB | Facility: HOSPITAL | Age: 80
End: 2025-07-11
Payer: MEDICARE

## 2025-07-11 DIAGNOSIS — R41.82 ALTERED MENTAL STATUS, UNSPECIFIED: ICD-10-CM

## 2025-07-11 DIAGNOSIS — N39.0 URINARY TRACT INFECTION, SITE NOT SPECIFIED: ICD-10-CM

## 2025-07-11 LAB
ALBUMIN SERPL-MCNC: 3.7 G/DL (ref 3.4–4.8)
ALBUMIN/GLOB SERPL: 1.2 RATIO (ref 1.1–2)
ALP SERPL-CCNC: 74 UNIT/L (ref 40–150)
ALT SERPL-CCNC: 13 UNIT/L (ref 0–55)
ANION GAP SERPL CALC-SCNC: 12 MEQ/L
AST SERPL-CCNC: 19 UNIT/L (ref 11–45)
BACTERIA #/AREA URNS AUTO: ABNORMAL /HPF
BASOPHILS # BLD AUTO: 0.02 X10(3)/MCL
BASOPHILS NFR BLD AUTO: 0.4 %
BILIRUB SERPL-MCNC: 0.5 MG/DL
BILIRUB UR QL STRIP.AUTO: NEGATIVE
BUN SERPL-MCNC: 12 MG/DL (ref 8.4–25.7)
CALCIUM SERPL-MCNC: 8.9 MG/DL (ref 8.8–10)
CHLORIDE SERPL-SCNC: 107 MMOL/L (ref 98–107)
CLARITY UR: CLEAR
CO2 SERPL-SCNC: 24 MMOL/L (ref 23–31)
COLOR UR AUTO: COLORLESS
CREAT SERPL-MCNC: 1.05 MG/DL (ref 0.72–1.25)
CREAT/UREA NIT SERPL: 11
EOSINOPHIL # BLD AUTO: 0.3 X10(3)/MCL (ref 0–0.9)
EOSINOPHIL NFR BLD AUTO: 5.5 %
ERYTHROCYTE [DISTWIDTH] IN BLOOD BY AUTOMATED COUNT: 17.3 % (ref 11.5–17)
GFR SERPLBLD CREATININE-BSD FMLA CKD-EPI: >60 ML/MIN/1.73/M2
GLOBULIN SER-MCNC: 3 GM/DL (ref 2.4–3.5)
GLUCOSE SERPL-MCNC: 77 MG/DL (ref 82–115)
GLUCOSE UR QL STRIP: NORMAL
HCT VFR BLD AUTO: 34.2 % (ref 42–52)
HGB BLD-MCNC: 10.8 G/DL (ref 14–18)
HGB UR QL STRIP: NEGATIVE
IMM GRANULOCYTES # BLD AUTO: 0.03 X10(3)/MCL (ref 0–0.04)
IMM GRANULOCYTES NFR BLD AUTO: 0.5 %
KETONES UR QL STRIP: NEGATIVE
LEUKOCYTE ESTERASE UR QL STRIP: NEGATIVE
LYMPHOCYTES # BLD AUTO: 1.59 X10(3)/MCL (ref 0.6–4.6)
LYMPHOCYTES NFR BLD AUTO: 29.1 %
MCH RBC QN AUTO: 30.9 PG (ref 27–31)
MCHC RBC AUTO-ENTMCNC: 31.6 G/DL (ref 33–36)
MCV RBC AUTO: 98 FL (ref 80–94)
MONOCYTES # BLD AUTO: 0.55 X10(3)/MCL (ref 0.1–1.3)
MONOCYTES NFR BLD AUTO: 10.1 %
NEUTROPHILS # BLD AUTO: 2.97 X10(3)/MCL (ref 2.1–9.2)
NEUTROPHILS NFR BLD AUTO: 54.4 %
NITRITE UR QL STRIP: NEGATIVE
NRBC BLD AUTO-RTO: 0 %
PH UR STRIP: 7 [PH]
PLATELET # BLD AUTO: 211 X10(3)/MCL (ref 130–400)
PMV BLD AUTO: 10.6 FL (ref 7.4–10.4)
POTASSIUM SERPL-SCNC: 4.4 MMOL/L (ref 3.5–5.1)
PROT SERPL-MCNC: 6.7 GM/DL (ref 5.8–7.6)
PROT UR QL STRIP: NEGATIVE
RBC # BLD AUTO: 3.49 X10(6)/MCL (ref 4.7–6.1)
RBC #/AREA URNS AUTO: ABNORMAL /HPF
SODIUM SERPL-SCNC: 143 MMOL/L (ref 136–145)
SP GR UR STRIP.AUTO: 1 (ref 1–1.03)
SQUAMOUS #/AREA URNS LPF: ABNORMAL /HPF
TSH SERPL-ACNC: 1.52 UIU/ML (ref 0.35–4.94)
UROBILINOGEN UR STRIP-ACNC: NORMAL
WBC # BLD AUTO: 5.46 X10(3)/MCL (ref 4.5–11.5)
WBC #/AREA URNS AUTO: ABNORMAL /HPF

## 2025-07-11 PROCEDURE — 85025 COMPLETE CBC W/AUTO DIFF WBC: CPT | Performed by: INTERNAL MEDICINE

## 2025-07-11 PROCEDURE — 80053 COMPREHEN METABOLIC PANEL: CPT | Performed by: INTERNAL MEDICINE

## 2025-07-11 PROCEDURE — 87086 URINE CULTURE/COLONY COUNT: CPT | Performed by: INTERNAL MEDICINE

## 2025-07-11 PROCEDURE — 81015 MICROSCOPIC EXAM OF URINE: CPT | Performed by: INTERNAL MEDICINE

## 2025-07-11 PROCEDURE — 84443 ASSAY THYROID STIM HORMONE: CPT | Performed by: INTERNAL MEDICINE

## 2025-07-13 LAB — BACTERIA UR CULT: NORMAL

## 2025-07-15 ENCOUNTER — HOSPITAL ENCOUNTER (OUTPATIENT)
Dept: RADIOLOGY | Facility: HOSPITAL | Age: 80
Discharge: HOME | End: 2025-07-15
Attending: INTERNAL MEDICINE
Payer: MEDICARE

## 2025-07-15 DIAGNOSIS — Z01.818 PREOPERATIVE CLEARANCE: ICD-10-CM

## 2025-07-15 PROBLEM — G93.40 ENCEPHALOPATHY: Status: RESOLVED | Noted: 2025-04-18 | Resolved: 2025-07-15

## 2025-07-15 PROCEDURE — 71046 X-RAY EXAM CHEST 2 VIEWS: CPT | Mod: TC

## 2025-07-28 ENCOUNTER — TELEPHONE (OUTPATIENT)
Dept: NEUROLOGY | Facility: CLINIC | Age: 80
End: 2025-07-28
Payer: MEDICARE

## 2025-07-28 DIAGNOSIS — G30.9 ALZHEIMER'S DISEASE: ICD-10-CM

## 2025-07-28 DIAGNOSIS — F91.9 BEHAVIOR DISTURBANCE: Primary | ICD-10-CM

## 2025-07-28 DIAGNOSIS — F02.80 ALZHEIMER'S DISEASE: ICD-10-CM

## 2025-07-28 DIAGNOSIS — F41.9 ANXIETY: ICD-10-CM

## 2025-07-28 RX ORDER — MEMANTINE HYDROCHLORIDE 10 MG/1
10 TABLET ORAL 2 TIMES DAILY
Qty: 180 TABLET | Refills: 3 | Status: SHIPPED | OUTPATIENT
Start: 2025-07-28 | End: 2026-07-31

## 2025-07-28 RX ORDER — SERTRALINE HYDROCHLORIDE 50 MG/1
75 TABLET, FILM COATED ORAL DAILY
Qty: 135 TABLET | Refills: 3 | Status: SHIPPED | OUTPATIENT
Start: 2025-07-28 | End: 2026-07-31

## 2025-08-11 ENCOUNTER — LAB REQUISITION (OUTPATIENT)
Dept: LAB | Facility: HOSPITAL | Age: 80
End: 2025-08-11
Payer: MEDICARE

## 2025-08-11 DIAGNOSIS — M81.0 AGE-RELATED OSTEOPOROSIS WITHOUT CURRENT PATHOLOGICAL FRACTURE: ICD-10-CM

## 2025-08-11 DIAGNOSIS — E85.9 AMYLOIDOSIS, UNSPECIFIED: ICD-10-CM

## 2025-08-11 DIAGNOSIS — G45.9 TRANSIENT CEREBRAL ISCHEMIC ATTACK, UNSPECIFIED: ICD-10-CM

## 2025-08-11 DIAGNOSIS — Z79.899 OTHER LONG TERM (CURRENT) DRUG THERAPY: ICD-10-CM

## 2025-08-11 DIAGNOSIS — I10 ESSENTIAL (PRIMARY) HYPERTENSION: ICD-10-CM

## 2025-08-11 DIAGNOSIS — D50.8 OTHER IRON DEFICIENCY ANEMIAS: ICD-10-CM

## 2025-08-11 LAB
ALBUMIN SERPL-MCNC: 3.8 G/DL (ref 3.4–4.8)
ALBUMIN/GLOB SERPL: 1.3 RATIO (ref 1.1–2)
ALP SERPL-CCNC: 70 UNIT/L (ref 40–150)
ALT SERPL-CCNC: 12 UNIT/L (ref 0–55)
ANION GAP SERPL CALC-SCNC: 10 MEQ/L
AST SERPL-CCNC: 16 UNIT/L (ref 11–45)
BASOPHILS # BLD AUTO: 0.03 X10(3)/MCL
BASOPHILS NFR BLD AUTO: 0.5 %
BILIRUB SERPL-MCNC: 0.4 MG/DL
BUN SERPL-MCNC: 17.8 MG/DL (ref 8.4–25.7)
CALCIUM SERPL-MCNC: 8.9 MG/DL (ref 8.8–10)
CHLORIDE SERPL-SCNC: 106 MMOL/L (ref 98–107)
CO2 SERPL-SCNC: 26 MMOL/L (ref 23–31)
CREAT SERPL-MCNC: 1.42 MG/DL (ref 0.72–1.25)
CREAT/UREA NIT SERPL: 13
EOSINOPHIL # BLD AUTO: 0.38 X10(3)/MCL (ref 0–0.9)
EOSINOPHIL NFR BLD AUTO: 6.7 %
ERYTHROCYTE [DISTWIDTH] IN BLOOD BY AUTOMATED COUNT: 16.3 % (ref 11.5–17)
FERRITIN SERPL-MCNC: 272.89 NG/ML (ref 21.81–274.66)
GFR SERPLBLD CREATININE-BSD FMLA CKD-EPI: 50 ML/MIN/1.73/M2
GLOBULIN SER-MCNC: 3 GM/DL (ref 2.4–3.5)
GLUCOSE SERPL-MCNC: 96 MG/DL (ref 82–115)
HCT VFR BLD AUTO: 34.9 % (ref 42–52)
HGB BLD-MCNC: 11.5 G/DL (ref 14–18)
IMM GRANULOCYTES # BLD AUTO: 0.03 X10(3)/MCL (ref 0–0.04)
IMM GRANULOCYTES NFR BLD AUTO: 0.5 %
IRON SATN MFR SERPL: 28 % (ref 20–50)
IRON SERPL-MCNC: 62 UG/DL (ref 65–175)
LYMPHOCYTES # BLD AUTO: 1.98 X10(3)/MCL (ref 0.6–4.6)
LYMPHOCYTES NFR BLD AUTO: 34.9 %
MCH RBC QN AUTO: 31.3 PG (ref 27–31)
MCHC RBC AUTO-ENTMCNC: 33 G/DL (ref 33–36)
MCV RBC AUTO: 94.8 FL (ref 80–94)
MONOCYTES # BLD AUTO: 0.5 X10(3)/MCL (ref 0.1–1.3)
MONOCYTES NFR BLD AUTO: 8.8 %
NEUTROPHILS # BLD AUTO: 2.76 X10(3)/MCL (ref 2.1–9.2)
NEUTROPHILS NFR BLD AUTO: 48.6 %
NRBC BLD AUTO-RTO: 0 %
PLATELET # BLD AUTO: 208 X10(3)/MCL (ref 130–400)
PMV BLD AUTO: 10.5 FL (ref 7.4–10.4)
POTASSIUM SERPL-SCNC: 4.1 MMOL/L (ref 3.5–5.1)
PROT SERPL-MCNC: 6.8 GM/DL (ref 5.8–7.6)
RBC # BLD AUTO: 3.68 X10(6)/MCL (ref 4.7–6.1)
SODIUM SERPL-SCNC: 142 MMOL/L (ref 136–145)
TIBC SERPL-MCNC: 162 UG/DL (ref 60–240)
TIBC SERPL-MCNC: 224 UG/DL (ref 250–450)
TRANSFERRIN SERPL-MCNC: 205 MG/DL (ref 163–344)
WBC # BLD AUTO: 5.68 X10(3)/MCL (ref 4.5–11.5)

## 2025-08-11 PROCEDURE — 85025 COMPLETE CBC W/AUTO DIFF WBC: CPT | Performed by: INTERNAL MEDICINE

## 2025-08-11 PROCEDURE — 83550 IRON BINDING TEST: CPT | Performed by: INTERNAL MEDICINE

## 2025-08-11 PROCEDURE — 80053 COMPREHEN METABOLIC PANEL: CPT | Performed by: INTERNAL MEDICINE

## 2025-08-11 PROCEDURE — 82728 ASSAY OF FERRITIN: CPT | Performed by: INTERNAL MEDICINE

## 2025-08-14 PROBLEM — F17.220 CHEWING TOBACCO NICOTINE DEPENDENCE WITHOUT COMPLICATION: Status: ACTIVE | Noted: 2025-01-28

## 2025-08-21 ENCOUNTER — LAB REQUISITION (OUTPATIENT)
Dept: LAB | Facility: HOSPITAL | Age: 80
End: 2025-08-21
Payer: MEDICARE

## 2025-08-21 DIAGNOSIS — Z13.0 ENCOUNTER FOR SCREENING FOR DISEASES OF THE BLOOD AND BLOOD-FORMING ORGANS AND CERTAIN DISORDERS INVOLVING THE IMMUNE MECHANISM: ICD-10-CM

## 2025-08-21 DIAGNOSIS — C61 MALIGNANT NEOPLASM OF PROSTATE: ICD-10-CM

## 2025-08-21 DIAGNOSIS — N39.0 URINARY TRACT INFECTION, SITE NOT SPECIFIED: ICD-10-CM

## 2025-08-21 DIAGNOSIS — R94.6 ABNORMAL RESULTS OF THYROID FUNCTION STUDIES: ICD-10-CM

## 2025-08-21 DIAGNOSIS — Z13.228 ENCOUNTER FOR SCREENING FOR OTHER METABOLIC DISORDERS: ICD-10-CM

## 2025-08-21 LAB
ALBUMIN SERPL-MCNC: 3.7 G/DL (ref 3.4–4.8)
ALBUMIN/GLOB SERPL: 1.1 RATIO (ref 1.1–2)
ALP SERPL-CCNC: 76 UNIT/L (ref 40–150)
ALT SERPL-CCNC: 11 UNIT/L (ref 0–55)
ANION GAP SERPL CALC-SCNC: 9 MEQ/L
AST SERPL-CCNC: 19 UNIT/L (ref 11–45)
BACTERIA #/AREA URNS AUTO: ABNORMAL /HPF
BASOPHILS # BLD AUTO: 0.03 X10(3)/MCL
BASOPHILS NFR BLD AUTO: 0.5 %
BILIRUB SERPL-MCNC: 0.3 MG/DL
BILIRUB UR QL STRIP.AUTO: NEGATIVE
BUN SERPL-MCNC: 18.8 MG/DL (ref 8.4–25.7)
CALCIUM SERPL-MCNC: 9.2 MG/DL (ref 8.8–10)
CHLORIDE SERPL-SCNC: 106 MMOL/L (ref 98–107)
CLARITY UR: CLEAR
CO2 SERPL-SCNC: 26 MMOL/L (ref 23–31)
COLOR UR AUTO: YELLOW
CREAT SERPL-MCNC: 1.32 MG/DL (ref 0.72–1.25)
CREAT/UREA NIT SERPL: 14
EOSINOPHIL # BLD AUTO: 0.35 X10(3)/MCL (ref 0–0.9)
EOSINOPHIL NFR BLD AUTO: 5.8 %
ERYTHROCYTE [DISTWIDTH] IN BLOOD BY AUTOMATED COUNT: 16 % (ref 11.5–17)
GFR SERPLBLD CREATININE-BSD FMLA CKD-EPI: 55 ML/MIN/1.73/M2
GLOBULIN SER-MCNC: 3.3 GM/DL (ref 2.4–3.5)
GLUCOSE SERPL-MCNC: 147 MG/DL (ref 82–115)
GLUCOSE UR QL STRIP: NEGATIVE
HCT VFR BLD AUTO: 36.2 % (ref 42–52)
HGB BLD-MCNC: 11.7 G/DL (ref 14–18)
HGB UR QL STRIP: ABNORMAL
IMM GRANULOCYTES # BLD AUTO: 0.05 X10(3)/MCL (ref 0–0.04)
IMM GRANULOCYTES NFR BLD AUTO: 0.8 %
KETONES UR QL STRIP: NEGATIVE
LEUKOCYTE ESTERASE UR QL STRIP: NEGATIVE
LYMPHOCYTES # BLD AUTO: 1.77 X10(3)/MCL (ref 0.6–4.6)
LYMPHOCYTES NFR BLD AUTO: 29.3 %
MCH RBC QN AUTO: 31.7 PG (ref 27–31)
MCHC RBC AUTO-ENTMCNC: 32.3 G/DL (ref 33–36)
MCV RBC AUTO: 98.1 FL (ref 80–94)
MONOCYTES # BLD AUTO: 0.59 X10(3)/MCL (ref 0.1–1.3)
MONOCYTES NFR BLD AUTO: 9.8 %
NEUTROPHILS # BLD AUTO: 3.26 X10(3)/MCL (ref 2.1–9.2)
NEUTROPHILS NFR BLD AUTO: 53.8 %
NITRITE UR QL STRIP: NEGATIVE
NRBC BLD AUTO-RTO: 0 %
PH UR STRIP: 6.5 [PH]
PLATELET # BLD AUTO: 204 X10(3)/MCL (ref 130–400)
PMV BLD AUTO: 10.8 FL (ref 7.4–10.4)
POTASSIUM SERPL-SCNC: 4.3 MMOL/L (ref 3.5–5.1)
PROT SERPL-MCNC: 7 GM/DL (ref 5.8–7.6)
PROT UR QL STRIP: ABNORMAL
RBC # BLD AUTO: 3.69 X10(6)/MCL (ref 4.7–6.1)
RBC #/AREA URNS AUTO: >100 /HPF
SODIUM SERPL-SCNC: 141 MMOL/L (ref 136–145)
SP GR UR STRIP.AUTO: 1.01 (ref 1–1.03)
SQUAMOUS #/AREA URNS AUTO: ABNORMAL /HPF
TSH SERPL-ACNC: 2.31 UIU/ML (ref 0.35–4.94)
UROBILINOGEN UR STRIP-ACNC: 0.2
WBC # BLD AUTO: 6.05 X10(3)/MCL (ref 4.5–11.5)
WBC #/AREA URNS AUTO: ABNORMAL /HPF

## 2025-08-21 PROCEDURE — 84443 ASSAY THYROID STIM HORMONE: CPT | Performed by: INTERNAL MEDICINE

## 2025-08-21 PROCEDURE — 84403 ASSAY OF TOTAL TESTOSTERONE: CPT | Performed by: INTERNAL MEDICINE

## 2025-08-21 PROCEDURE — 80053 COMPREHEN METABOLIC PANEL: CPT | Performed by: INTERNAL MEDICINE

## 2025-08-21 PROCEDURE — 85025 COMPLETE CBC W/AUTO DIFF WBC: CPT | Performed by: INTERNAL MEDICINE

## 2025-08-21 PROCEDURE — 81003 URINALYSIS AUTO W/O SCOPE: CPT | Performed by: INTERNAL MEDICINE

## 2025-08-21 PROCEDURE — 87086 URINE CULTURE/COLONY COUNT: CPT | Performed by: INTERNAL MEDICINE

## 2025-08-22 LAB — TESTOST SERPL-MCNC: 228.25 NG/DL (ref 220.91–715.81)

## 2025-08-24 LAB — BACTERIA UR CULT: NO GROWTH

## 2025-08-25 ENCOUNTER — LAB REQUISITION (OUTPATIENT)
Dept: LAB | Facility: HOSPITAL | Age: 80
End: 2025-08-25
Payer: MEDICARE

## 2025-08-25 DIAGNOSIS — N40.3 NODULAR PROSTATE WITH LOWER URINARY TRACT SYMPTOMS: ICD-10-CM

## 2025-08-25 LAB
ALBUMIN SERPL-MCNC: 3.5 G/DL (ref 3.4–4.8)
ALBUMIN/GLOB SERPL: 1.1 RATIO (ref 1.1–2)
ALP SERPL-CCNC: 84 UNIT/L (ref 40–150)
ALT SERPL-CCNC: 12 UNIT/L (ref 0–55)
ANION GAP SERPL CALC-SCNC: 9 MEQ/L
AST SERPL-CCNC: 19 UNIT/L (ref 11–45)
BASOPHILS # BLD AUTO: 0.02 X10(3)/MCL
BASOPHILS NFR BLD AUTO: 0.4 %
BILIRUB SERPL-MCNC: 0.4 MG/DL
BUN SERPL-MCNC: 20.8 MG/DL (ref 8.4–25.7)
CALCIUM SERPL-MCNC: 8.8 MG/DL (ref 8.8–10)
CHLORIDE SERPL-SCNC: 105 MMOL/L (ref 98–107)
CO2 SERPL-SCNC: 26 MMOL/L (ref 23–31)
CREAT SERPL-MCNC: 1.23 MG/DL (ref 0.72–1.25)
CREAT/UREA NIT SERPL: 17
EOSINOPHIL # BLD AUTO: 0.31 X10(3)/MCL (ref 0–0.9)
EOSINOPHIL NFR BLD AUTO: 5.7 %
ERYTHROCYTE [DISTWIDTH] IN BLOOD BY AUTOMATED COUNT: 16.2 % (ref 11.5–17)
GFR SERPLBLD CREATININE-BSD FMLA CKD-EPI: 60 ML/MIN/1.73/M2
GLOBULIN SER-MCNC: 3.2 GM/DL (ref 2.4–3.5)
GLUCOSE SERPL-MCNC: 121 MG/DL (ref 82–115)
HCT VFR BLD AUTO: 35.8 % (ref 42–52)
HGB BLD-MCNC: 11.6 G/DL (ref 14–18)
IMM GRANULOCYTES # BLD AUTO: 0.06 X10(3)/MCL (ref 0–0.04)
IMM GRANULOCYTES NFR BLD AUTO: 1.1 %
LYMPHOCYTES # BLD AUTO: 1.4 X10(3)/MCL (ref 0.6–4.6)
LYMPHOCYTES NFR BLD AUTO: 25.9 %
MCH RBC QN AUTO: 31.7 PG (ref 27–31)
MCHC RBC AUTO-ENTMCNC: 32.4 G/DL (ref 33–36)
MCV RBC AUTO: 97.8 FL (ref 80–94)
MONOCYTES # BLD AUTO: 0.49 X10(3)/MCL (ref 0.1–1.3)
MONOCYTES NFR BLD AUTO: 9.1 %
NEUTROPHILS # BLD AUTO: 3.13 X10(3)/MCL (ref 2.1–9.2)
NEUTROPHILS NFR BLD AUTO: 57.8 %
NRBC BLD AUTO-RTO: 0 %
PLATELET # BLD AUTO: 198 X10(3)/MCL (ref 130–400)
PMV BLD AUTO: 10.5 FL (ref 7.4–10.4)
POTASSIUM SERPL-SCNC: 4 MMOL/L (ref 3.5–5.1)
PROT SERPL-MCNC: 6.7 GM/DL (ref 5.8–7.6)
RBC # BLD AUTO: 3.66 X10(6)/MCL (ref 4.7–6.1)
SODIUM SERPL-SCNC: 140 MMOL/L (ref 136–145)
TESTOST SERPL-MCNC: 216.93 NG/DL (ref 220.91–715.81)
WBC # BLD AUTO: 5.41 X10(3)/MCL (ref 4.5–11.5)

## 2025-08-25 PROCEDURE — 80053 COMPREHEN METABOLIC PANEL: CPT | Performed by: UROLOGY

## 2025-08-25 PROCEDURE — 85025 COMPLETE CBC W/AUTO DIFF WBC: CPT | Performed by: UROLOGY

## 2025-08-25 PROCEDURE — 84403 ASSAY OF TOTAL TESTOSTERONE: CPT | Performed by: UROLOGY

## 2025-08-27 ENCOUNTER — HOSPITAL ENCOUNTER (OUTPATIENT)
Dept: RADIOLOGY | Facility: HOSPITAL | Age: 80
Discharge: HOME | End: 2025-08-27
Attending: INTERNAL MEDICINE
Payer: MEDICARE

## 2025-08-27 DIAGNOSIS — Z02.2 ENCOUNTER FOR EXAMINATION FOR ADMISSION TO RESIDENTIAL INSTITUTION: ICD-10-CM

## 2025-08-27 PROCEDURE — 71046 X-RAY EXAM CHEST 2 VIEWS: CPT | Mod: TC

## 2025-08-28 ENCOUNTER — TELEPHONE (OUTPATIENT)
Dept: NEUROLOGY | Facility: CLINIC | Age: 80
End: 2025-08-28
Payer: MEDICARE